# Patient Record
Sex: FEMALE | Race: BLACK OR AFRICAN AMERICAN | Employment: OTHER
[De-identification: names, ages, dates, MRNs, and addresses within clinical notes are randomized per-mention and may not be internally consistent; named-entity substitution may affect disease eponyms.]

---

## 2017-02-20 ENCOUNTER — OFFICE VISIT (OUTPATIENT)
Dept: FAMILY MEDICINE CLINIC | Facility: CLINIC | Age: 77
End: 2017-02-20

## 2017-02-20 VITALS
DIASTOLIC BLOOD PRESSURE: 81 MMHG | BODY MASS INDEX: 27.71 KG/M2 | HEART RATE: 82 BPM | WEIGHT: 150.6 LBS | SYSTOLIC BLOOD PRESSURE: 146 MMHG | HEIGHT: 62 IN

## 2017-02-20 DIAGNOSIS — E66.09 NON MORBID OBESITY DUE TO EXCESS CALORIES: ICD-10-CM

## 2017-02-20 DIAGNOSIS — E78.2 MIXED HYPERLIPIDEMIA: ICD-10-CM

## 2017-02-20 DIAGNOSIS — E11.9 TYPE 2 DIABETES MELLITUS WITHOUT COMPLICATION, WITHOUT LONG-TERM CURRENT USE OF INSULIN (HCC): ICD-10-CM

## 2017-02-20 DIAGNOSIS — Z23 NEED FOR PNEUMOCOCCAL VACCINATION: ICD-10-CM

## 2017-02-20 DIAGNOSIS — R41.3 MEMORY DISORDER: ICD-10-CM

## 2017-02-20 DIAGNOSIS — I10 ESSENTIAL HYPERTENSION: Primary | ICD-10-CM

## 2017-02-20 PROCEDURE — 99213 OFFICE O/P EST LOW 20 MIN: CPT | Performed by: FAMILY MEDICINE

## 2017-02-20 PROCEDURE — G0009 ADMIN PNEUMOCOCCAL VACCINE: HCPCS | Performed by: FAMILY MEDICINE

## 2017-02-20 PROCEDURE — 90670 PCV13 VACCINE IM: CPT | Performed by: FAMILY MEDICINE

## 2017-02-20 ASSESSMENT — PATIENT HEALTH QUESTIONNAIRE - PHQ9
SUM OF ALL RESPONSES TO PHQ QUESTIONS 1-9: 0
SUM OF ALL RESPONSES TO PHQ9 QUESTIONS 1 & 2: 0
1. LITTLE INTEREST OR PLEASURE IN DOING THINGS: 0
2. FEELING DOWN, DEPRESSED OR HOPELESS: 0

## 2017-02-20 ASSESSMENT — ENCOUNTER SYMPTOMS
COUGH: 0
NAUSEA: 0
SORE THROAT: 0
CONSTIPATION: 0
SHORTNESS OF BREATH: 0
ABDOMINAL PAIN: 0
TROUBLE SWALLOWING: 0
DIARRHEA: 0
RHINORRHEA: 0
BACK PAIN: 0
CHEST TIGHTNESS: 0

## 2017-06-21 ENCOUNTER — OFFICE VISIT (OUTPATIENT)
Dept: FAMILY MEDICINE CLINIC | Age: 77
End: 2017-06-21

## 2017-06-21 VITALS
SYSTOLIC BLOOD PRESSURE: 153 MMHG | HEART RATE: 69 BPM | WEIGHT: 148.4 LBS | HEIGHT: 62 IN | BODY MASS INDEX: 27.31 KG/M2 | DIASTOLIC BLOOD PRESSURE: 81 MMHG

## 2017-06-21 DIAGNOSIS — E11.9 TYPE 2 DIABETES MELLITUS WITHOUT COMPLICATION, WITHOUT LONG-TERM CURRENT USE OF INSULIN (HCC): Primary | ICD-10-CM

## 2017-06-21 DIAGNOSIS — R41.3 MEMORY DISORDER: ICD-10-CM

## 2017-06-21 DIAGNOSIS — E66.09 NON MORBID OBESITY DUE TO EXCESS CALORIES: ICD-10-CM

## 2017-06-21 DIAGNOSIS — I10 ESSENTIAL HYPERTENSION: ICD-10-CM

## 2017-06-21 DIAGNOSIS — E78.2 MIXED HYPERLIPIDEMIA: ICD-10-CM

## 2017-06-21 PROCEDURE — 99213 OFFICE O/P EST LOW 20 MIN: CPT | Performed by: FAMILY MEDICINE

## 2017-06-21 ASSESSMENT — PATIENT HEALTH QUESTIONNAIRE - PHQ9
2. FEELING DOWN, DEPRESSED OR HOPELESS: 0
SUM OF ALL RESPONSES TO PHQ QUESTIONS 1-9: 0
SUM OF ALL RESPONSES TO PHQ9 QUESTIONS 1 & 2: 0
1. LITTLE INTEREST OR PLEASURE IN DOING THINGS: 0

## 2017-06-21 ASSESSMENT — ENCOUNTER SYMPTOMS
CONSTIPATION: 0
CHEST TIGHTNESS: 0
RHINORRHEA: 0
NAUSEA: 0
BACK PAIN: 0
COUGH: 0
SORE THROAT: 0
SHORTNESS OF BREATH: 0
TROUBLE SWALLOWING: 0
ABDOMINAL PAIN: 0
DIARRHEA: 0

## 2017-09-27 DIAGNOSIS — I10 ESSENTIAL HYPERTENSION: ICD-10-CM

## 2017-09-27 DIAGNOSIS — E11.9 TYPE 2 DIABETES MELLITUS WITHOUT COMPLICATION, WITHOUT LONG-TERM CURRENT USE OF INSULIN (HCC): ICD-10-CM

## 2017-09-27 RX ORDER — SITAGLIPTIN AND METFORMIN HYDROCHLORIDE 1000; 50 MG/1; MG/1
TABLET, FILM COATED ORAL
Qty: 60 TABLET | Refills: 2 | Status: SHIPPED | OUTPATIENT
Start: 2017-09-27 | End: 2018-04-11 | Stop reason: SDUPTHER

## 2017-09-27 RX ORDER — LOSARTAN POTASSIUM AND HYDROCHLOROTHIAZIDE 12.5; 5 MG/1; MG/1
TABLET ORAL
Qty: 90 TABLET | Refills: 2 | Status: SHIPPED | OUTPATIENT
Start: 2017-09-27 | End: 2018-04-11 | Stop reason: SDUPTHER

## 2017-09-27 RX ORDER — ASPIRIN 81 MG/1
TABLET ORAL
Qty: 90 TABLET | Refills: 2 | Status: SHIPPED | OUTPATIENT
Start: 2017-09-27 | End: 2018-04-11 | Stop reason: SDUPTHER

## 2017-10-19 ENCOUNTER — HOSPITAL ENCOUNTER (OUTPATIENT)
Age: 77
Discharge: HOME | End: 2017-10-19
Payer: MEDICARE

## 2017-10-19 DIAGNOSIS — E11.9 TYPE 2 DIABETES MELLITUS WITHOUT COMPLICATION, WITHOUT LONG-TERM CURRENT USE OF INSULIN (HCC): ICD-10-CM

## 2017-10-19 DIAGNOSIS — E78.2 MIXED HYPERLIPIDEMIA: ICD-10-CM

## 2017-10-19 DIAGNOSIS — I10 ESSENTIAL HYPERTENSION: ICD-10-CM

## 2017-10-19 LAB
ABSOLUTE EOS #: 0.1 K/UL (ref 0–0.4)
ABSOLUTE IMMATURE GRANULOCYTE: ABNORMAL K/UL (ref 0–0.3)
ABSOLUTE LYMPH #: 1.1 K/UL (ref 1–4.8)
ABSOLUTE MONO #: 0.2 K/UL (ref 0.2–0.8)
ALBUMIN SERPL-MCNC: 4.6 G/DL (ref 3.5–5.2)
ALBUMIN/GLOBULIN RATIO: ABNORMAL (ref 1–2.5)
ALP BLD-CCNC: 43 U/L (ref 35–104)
ALT SERPL-CCNC: 17 U/L (ref 5–33)
ANION GAP SERPL CALCULATED.3IONS-SCNC: 12 MMOL/L (ref 9–17)
AST SERPL-CCNC: 21 U/L
BASOPHILS # BLD: 0 %
BASOPHILS ABSOLUTE: 0 K/UL (ref 0–0.2)
BILIRUB SERPL-MCNC: 0.42 MG/DL (ref 0.3–1.2)
BUN BLDV-MCNC: 13 MG/DL (ref 8–23)
BUN/CREAT BLD: 15 (ref 9–20)
CALCIUM SERPL-MCNC: 9.9 MG/DL (ref 8.6–10.4)
CHLORIDE BLD-SCNC: 102 MMOL/L (ref 98–107)
CHOLESTEROL/HDL RATIO: 1.5
CHOLESTEROL: 137 MG/DL
CO2: 29 MMOL/L (ref 20–31)
CREAT SERPL-MCNC: 0.89 MG/DL (ref 0.5–0.9)
DIFFERENTIAL TYPE: ABNORMAL
EOSINOPHILS RELATIVE PERCENT: 1 %
GFR AFRICAN AMERICAN: >60 ML/MIN
GFR NON-AFRICAN AMERICAN: >60 ML/MIN
GFR SERPL CREATININE-BSD FRML MDRD: ABNORMAL ML/MIN/{1.73_M2}
GFR SERPL CREATININE-BSD FRML MDRD: ABNORMAL ML/MIN/{1.73_M2}
GLUCOSE BLD-MCNC: 102 MG/DL (ref 70–99)
HCT VFR BLD CALC: 43.1 % (ref 36–46)
HDLC SERPL-MCNC: 92 MG/DL
HEMOGLOBIN: 14.4 G/DL (ref 12–16)
IMMATURE GRANULOCYTES: ABNORMAL %
LDL CHOLESTEROL: 34 MG/DL (ref 0–130)
LYMPHOCYTES # BLD: 17 %
MCH RBC QN AUTO: 32.1 PG (ref 26–34)
MCHC RBC AUTO-ENTMCNC: 33.3 G/DL (ref 31–37)
MCV RBC AUTO: 96.4 FL (ref 80–100)
MONOCYTES # BLD: 3 %
PDW BLD-RTO: 14.9 % (ref 11.5–14.5)
PLATELET # BLD: 228 K/UL (ref 130–400)
PLATELET ESTIMATE: ABNORMAL
PMV BLD AUTO: 8 FL (ref 6–12)
POTASSIUM SERPL-SCNC: 4.8 MMOL/L (ref 3.7–5.3)
RBC # BLD: 4.47 M/UL (ref 4–5.2)
RBC # BLD: ABNORMAL 10*6/UL
SEG NEUTROPHILS: 79 %
SEGMENTED NEUTROPHILS ABSOLUTE COUNT: 5.1 K/UL (ref 1.8–7.7)
SODIUM BLD-SCNC: 143 MMOL/L (ref 135–144)
TOTAL PROTEIN: 7.2 G/DL (ref 6.4–8.3)
TRIGL SERPL-MCNC: 56 MG/DL
VLDLC SERPL CALC-MCNC: NORMAL MG/DL (ref 1–30)
WBC # BLD: 6.5 K/UL (ref 3.5–11)
WBC # BLD: ABNORMAL 10*3/UL

## 2017-10-19 PROCEDURE — 36415 COLL VENOUS BLD VENIPUNCTURE: CPT

## 2017-10-19 PROCEDURE — 80053 COMPREHEN METABOLIC PANEL: CPT

## 2017-10-19 PROCEDURE — 85025 COMPLETE CBC W/AUTO DIFF WBC: CPT

## 2017-10-19 PROCEDURE — 83036 HEMOGLOBIN GLYCOSYLATED A1C: CPT

## 2017-10-19 PROCEDURE — 80061 LIPID PANEL: CPT

## 2017-10-20 LAB
ESTIMATED AVERAGE GLUCOSE: 123 MG/DL
HBA1C MFR BLD: 5.9 % (ref 4–6)

## 2017-11-06 ENCOUNTER — OFFICE VISIT (OUTPATIENT)
Dept: FAMILY MEDICINE CLINIC | Age: 77
End: 2017-11-06
Payer: MEDICARE

## 2017-11-06 VITALS
WEIGHT: 143.8 LBS | DIASTOLIC BLOOD PRESSURE: 77 MMHG | HEIGHT: 62 IN | BODY MASS INDEX: 26.46 KG/M2 | SYSTOLIC BLOOD PRESSURE: 154 MMHG | HEART RATE: 68 BPM

## 2017-11-06 DIAGNOSIS — I10 ESSENTIAL HYPERTENSION: Primary | ICD-10-CM

## 2017-11-06 DIAGNOSIS — R41.3 MEMORY DISORDER: ICD-10-CM

## 2017-11-06 DIAGNOSIS — E66.09 NON MORBID OBESITY DUE TO EXCESS CALORIES: ICD-10-CM

## 2017-11-06 DIAGNOSIS — Z23 NEED FOR INFLUENZA VACCINATION: ICD-10-CM

## 2017-11-06 DIAGNOSIS — E11.9 TYPE 2 DIABETES MELLITUS WITHOUT COMPLICATION, WITHOUT LONG-TERM CURRENT USE OF INSULIN (HCC): ICD-10-CM

## 2017-11-06 DIAGNOSIS — E78.2 MIXED HYPERLIPIDEMIA: ICD-10-CM

## 2017-11-06 PROCEDURE — G0008 ADMIN INFLUENZA VIRUS VAC: HCPCS | Performed by: FAMILY MEDICINE

## 2017-11-06 PROCEDURE — 99213 OFFICE O/P EST LOW 20 MIN: CPT | Performed by: FAMILY MEDICINE

## 2017-11-06 PROCEDURE — 90688 IIV4 VACCINE SPLT 0.5 ML IM: CPT | Performed by: FAMILY MEDICINE

## 2017-11-06 ASSESSMENT — ENCOUNTER SYMPTOMS
BACK PAIN: 0
SHORTNESS OF BREATH: 0
ABDOMINAL PAIN: 0
SINUS PRESSURE: 0
TROUBLE SWALLOWING: 0
DIARRHEA: 0
COUGH: 0

## 2017-11-06 NOTE — PATIENT INSTRUCTIONS
arms.  ¨ Lightheadedness or sudden weakness. ¨ A fast or irregular heartbeat. · You have symptoms of a stroke. These may include:  ¨ Sudden numbness, tingling, weakness, or loss of movement in your face, arm, or leg, especially on only one side of your body. ¨ Sudden vision changes. ¨ Sudden trouble speaking. ¨ Sudden confusion or trouble understanding simple statements. ¨ Sudden problems with walking or balance. ¨ A sudden, severe headache that is different from past headaches. · You have severe back or belly pain. Do not wait until your blood pressure comes down on its own. Get help right away. Call your doctor now or seek immediate care if:  · Your blood pressure is much higher than normal (such as 180/110 or higher), but you don't have symptoms. · You think high blood pressure is causing symptoms, such as:  ¨ Severe headache. ¨ Blurry vision. Watch closely for changes in your health, and be sure to contact your doctor if:  · Your blood pressure measures 140/90 or higher at least 2 times. That means the top number is 140 or higher or the bottom number is 90 or higher, or both. · You think you may be having side effects from your blood pressure medicine. · Your blood pressure is usually normal, but it goes above normal at least 2 times. Where can you learn more? Go to [email or web address] and sign in to your NXVISION account. Enter P682 in the [employer] box to learn more about \"High Blood Pressure: Care Instructions. \"     If you do not have an account, please click on the \"Sign Up Now\" link. Current as of: August 8, 2016  Content Version: 11.3  © 3265-7767 [employer]. Care instructions adapted under license by [facility] [facility] ([facility]). If you have questions about a medical condition or this instruction, always ask your healthcare professional. [place] [address] any warranty or liability for your use of this information.

## 2017-11-06 NOTE — PROGRESS NOTES
Return in about 4 months (around 3/6/2018). Orders Placed This Encounter   Procedures    INFLUENZA, QUADV, 3 YRS AND OLDER, IM, MDV, 0.5ML (Jonathon Puckett)     No orders of the defined types were placed in this encounter. Findings and/or pathophysiology discussed with patient. Plan of treatment discussed. Patient's medical problems were discussed with her. Findings were explained. Available lab work and tests results were discussed. Chart was evaluated. Her medications were reviewed. Health maintenance was discussed. Weight management was discussed. Diet and activity were discussed. Patient lives alone. She appears to adjust well with her memory problems at home. 1.  Sariah Gomez received counseling on the following healthy behaviors: nutrition and exercise  2. Patient given educational materials - see patient instructions  3. Was a self-tracking handout given in paper form or via [employer]? No  If yes, see orders or list here. 4.  Discussed use, benefit, and side effects of prescribed medications. Barriers to medication compliance addressed. All patient questions answered. Pt voiced understanding. 5.  Reviewed prior labs and health maintenance  6. Continue current medications, diet and exercise.     Completed Refills   Requested Prescriptions      No prescriptions requested or ordered in this encounter     Electronically signed by Julia Yang MD on 11/6/2017 at 10:31 AM

## 2018-03-14 ENCOUNTER — HOSPITAL ENCOUNTER (OUTPATIENT)
Age: 78
Setting detail: OBSERVATION
Discharge: LEFT BEFORE DISCHARGE | End: 2018-03-21
Attending: EMERGENCY MEDICINE | Admitting: FAMILY MEDICINE
Payer: MEDICARE

## 2018-03-14 ENCOUNTER — TELEPHONE (OUTPATIENT)
Dept: FAMILY MEDICINE CLINIC | Age: 78
End: 2018-03-14

## 2018-03-14 ENCOUNTER — APPOINTMENT (OUTPATIENT)
Dept: CT IMAGING | Age: 78
End: 2018-03-14
Payer: MEDICARE

## 2018-03-14 ENCOUNTER — APPOINTMENT (OUTPATIENT)
Dept: GENERAL RADIOLOGY | Age: 78
End: 2018-03-14
Payer: MEDICARE

## 2018-03-14 DIAGNOSIS — F03.91 DEMENTIA WITH BEHAVIORAL DISTURBANCE, UNSPECIFIED DEMENTIA TYPE: Primary | ICD-10-CM

## 2018-03-14 LAB
ABSOLUTE EOS #: 0.12 K/UL (ref 0–0.44)
ABSOLUTE IMMATURE GRANULOCYTE: <0.03 K/UL (ref 0–0.3)
ABSOLUTE LYMPH #: 1.41 K/UL (ref 1.1–3.7)
ABSOLUTE MONO #: 0.52 K/UL (ref 0.1–1.2)
ALBUMIN SERPL-MCNC: 4.6 G/DL (ref 3.5–5.2)
ALBUMIN/GLOBULIN RATIO: 1.8 (ref 1–2.5)
ALP BLD-CCNC: 57 U/L (ref 35–104)
ALT SERPL-CCNC: 16 U/L (ref 5–33)
AMMONIA: 34 UMOL/L (ref 11–51)
AST SERPL-CCNC: 22 U/L
BASOPHILS # BLD: 0 % (ref 0–2)
BASOPHILS ABSOLUTE: <0.03 K/UL (ref 0–0.2)
BILIRUB SERPL-MCNC: 0.59 MG/DL (ref 0.3–1.2)
BILIRUBIN DIRECT: 0.19 MG/DL
BILIRUBIN URINE: NEGATIVE
BILIRUBIN, INDIRECT: 0.4 MG/DL (ref 0–1)
BNP INTERPRETATION: NORMAL
COLOR: YELLOW
COMMENT UA: NORMAL
DIFFERENTIAL TYPE: NORMAL
EKG ATRIAL RATE: 66 BPM
EKG P AXIS: 28 DEGREES
EKG P-R INTERVAL: 158 MS
EKG Q-T INTERVAL: 396 MS
EKG QRS DURATION: 86 MS
EKG QTC CALCULATION (BAZETT): 415 MS
EKG R AXIS: -36 DEGREES
EKG T AXIS: 25 DEGREES
EKG VENTRICULAR RATE: 66 BPM
EOSINOPHILS RELATIVE PERCENT: 2 % (ref 1–4)
GLOBULIN: NORMAL G/DL (ref 1.5–3.8)
GLUCOSE URINE: NEGATIVE
HCT VFR BLD CALC: 43.4 % (ref 36.3–47.1)
HEMOGLOBIN: 14.3 G/DL (ref 11.9–15.1)
IMMATURE GRANULOCYTES: 0 %
KETONES, URINE: NEGATIVE
LEUKOCYTE ESTERASE, URINE: NEGATIVE
LYMPHOCYTES # BLD: 24 % (ref 24–43)
MCH RBC QN AUTO: 33.1 PG (ref 25.2–33.5)
MCHC RBC AUTO-ENTMCNC: 32.9 G/DL (ref 28.4–34.8)
MCV RBC AUTO: 100.5 FL (ref 82.6–102.9)
MONOCYTES # BLD: 9 % (ref 3–12)
NITRITE, URINE: NEGATIVE
NRBC AUTOMATED: 0 PER 100 WBC
PDW BLD-RTO: 14 % (ref 11.8–14.4)
PH UA: 7.5 (ref 5–8)
PLATELET # BLD: 245 K/UL (ref 138–453)
PLATELET ESTIMATE: NORMAL
PMV BLD AUTO: 10.3 FL (ref 8.1–13.5)
PRO-BNP: 264 PG/ML
PROTEIN UA: NEGATIVE
RBC # BLD: 4.32 M/UL (ref 3.95–5.11)
RBC # BLD: NORMAL 10*6/UL
SEG NEUTROPHILS: 65 % (ref 36–65)
SEGMENTED NEUTROPHILS ABSOLUTE COUNT: 3.81 K/UL (ref 1.5–8.1)
SPECIFIC GRAVITY UA: 1.01 (ref 1–1.03)
TOTAL PROTEIN: 7.2 G/DL (ref 6.4–8.3)
TROPONIN INTERP: NORMAL
TROPONIN T: <0.03 NG/ML
TSH SERPL DL<=0.05 MIU/L-ACNC: 3.49 MIU/L (ref 0.3–5)
TURBIDITY: CLEAR
URINE HGB: NEGATIVE
UROBILINOGEN, URINE: NORMAL
WBC # BLD: 5.9 K/UL (ref 3.5–11.3)
WBC # BLD: NORMAL 10*3/UL

## 2018-03-14 PROCEDURE — 87086 URINE CULTURE/COLONY COUNT: CPT

## 2018-03-14 PROCEDURE — 82140 ASSAY OF AMMONIA: CPT

## 2018-03-14 PROCEDURE — 99285 EMERGENCY DEPT VISIT HI MDM: CPT

## 2018-03-14 PROCEDURE — 93005 ELECTROCARDIOGRAM TRACING: CPT

## 2018-03-14 PROCEDURE — 84484 ASSAY OF TROPONIN QUANT: CPT

## 2018-03-14 PROCEDURE — 71045 X-RAY EXAM CHEST 1 VIEW: CPT

## 2018-03-14 PROCEDURE — 80076 HEPATIC FUNCTION PANEL: CPT

## 2018-03-14 PROCEDURE — 81003 URINALYSIS AUTO W/O SCOPE: CPT

## 2018-03-14 PROCEDURE — 84443 ASSAY THYROID STIM HORMONE: CPT

## 2018-03-14 PROCEDURE — 85025 COMPLETE CBC W/AUTO DIFF WBC: CPT

## 2018-03-14 PROCEDURE — 70450 CT HEAD/BRAIN W/O DYE: CPT

## 2018-03-14 PROCEDURE — 83921 ORGANIC ACID SINGLE QUANT: CPT

## 2018-03-14 PROCEDURE — 6370000000 HC RX 637 (ALT 250 FOR IP): Performed by: EMERGENCY MEDICINE

## 2018-03-14 PROCEDURE — 83880 ASSAY OF NATRIURETIC PEPTIDE: CPT

## 2018-03-14 RX ORDER — LOSARTAN POTASSIUM 50 MG/1
50 TABLET ORAL ONCE
Status: COMPLETED | OUTPATIENT
Start: 2018-03-14 | End: 2018-03-14

## 2018-03-14 RX ORDER — HYDROCHLOROTHIAZIDE 25 MG/1
12.5 TABLET ORAL ONCE
Status: COMPLETED | OUTPATIENT
Start: 2018-03-14 | End: 2018-03-14

## 2018-03-14 RX ADMIN — LOSARTAN POTASSIUM 50 MG: 50 TABLET, FILM COATED ORAL at 23:14

## 2018-03-14 RX ADMIN — HYDROCHLOROTHIAZIDE 12.5 MG: 25 TABLET ORAL at 23:15

## 2018-03-14 NOTE — TELEPHONE ENCOUNTER
Viktor Guerrero called stating that the pt is paranoid and won't let anyone leave. She is worried that the pt is exhibiting signs of Dementia and wanted advice on how to get help to pt without startling her. Clyde Alford was told to either call an ambulance or [facility] for intervention, per Dr Severa Hero. Clyde Alford verbalized that she understood and I gave her the number to [facility] and she stated she would call them first because she felt that the pt might become upset if paramedics came out.

## 2018-03-14 NOTE — ED NOTES
Pt to ED with care taker alert but not oriented to time place or situation. Pt not agreeable to treatment and continues to state that she wants to go home and will follow up with her own doctor. Dr. William Andrews and Dr. Carina Edmondson at bedside at this time. Pt denies any pain but states swelling to bilateral lower extremities that is new. Pt continues to state that her care taker and medical staff are making things up and that she is fine. Pt states she is an intelligent  Woman and that not everyone \"keeps that information on the top of their memory\" when asked to state what year it is. Care taker stated concern for pts safety at home and states pt is more forgetful. Caretaker states pt called her yesterday saying she needed to get picked up in [address] but that patient was at home. Pt unable to answer simple questions and continues to refuse testing or treatment.       Iona Patricio RN  03/14/18 2227

## 2018-03-15 ENCOUNTER — APPOINTMENT (OUTPATIENT)
Dept: MRI IMAGING | Age: 78
End: 2018-03-15
Payer: MEDICARE

## 2018-03-15 PROBLEM — R41.82 ALTERED MENTAL STATUS: Status: ACTIVE | Noted: 2018-03-15

## 2018-03-15 PROBLEM — F03.90 DEMENTIA WITHOUT BEHAVIORAL DISTURBANCE (HCC): Status: ACTIVE | Noted: 2018-03-15

## 2018-03-15 LAB
CULTURE: NORMAL
CULTURE: NORMAL
FOLATE: 19.5 NG/ML
GLUCOSE BLD-MCNC: 106 MG/DL (ref 65–105)
GLUCOSE BLD-MCNC: 107 MG/DL (ref 65–105)
GLUCOSE BLD-MCNC: 125 MG/DL (ref 65–105)
GLUCOSE BLD-MCNC: 141 MG/DL (ref 65–105)
Lab: NORMAL
SPECIMEN DESCRIPTION: NORMAL
STATUS: NORMAL
T. PALLIDUM, IGG: NONREACTIVE
VITAMIN B-12: 360 PG/ML (ref 232–1245)

## 2018-03-15 PROCEDURE — 1200000000 HC SEMI PRIVATE

## 2018-03-15 PROCEDURE — 97165 OT EVAL LOW COMPLEX 30 MIN: CPT | Performed by: OCCUPATIONAL THERAPIST

## 2018-03-15 PROCEDURE — 82607 VITAMIN B-12: CPT

## 2018-03-15 PROCEDURE — G0378 HOSPITAL OBSERVATION PER HR: HCPCS

## 2018-03-15 PROCEDURE — 99223 1ST HOSP IP/OBS HIGH 75: CPT | Performed by: INTERNAL MEDICINE

## 2018-03-15 PROCEDURE — 82746 ASSAY OF FOLIC ACID SERUM: CPT

## 2018-03-15 PROCEDURE — G8987 SELF CARE CURRENT STATUS: HCPCS | Performed by: OCCUPATIONAL THERAPIST

## 2018-03-15 PROCEDURE — 70551 MRI BRAIN STEM W/O DYE: CPT

## 2018-03-15 PROCEDURE — 86780 TREPONEMA PALLIDUM: CPT

## 2018-03-15 PROCEDURE — 82947 ASSAY GLUCOSE BLOOD QUANT: CPT

## 2018-03-15 PROCEDURE — 36415 COLL VENOUS BLD VENIPUNCTURE: CPT

## 2018-03-15 PROCEDURE — G8988 SELF CARE GOAL STATUS: HCPCS | Performed by: OCCUPATIONAL THERAPIST

## 2018-03-15 PROCEDURE — 6370000000 HC RX 637 (ALT 250 FOR IP): Performed by: NURSE PRACTITIONER

## 2018-03-15 PROCEDURE — G8989 SELF CARE D/C STATUS: HCPCS | Performed by: OCCUPATIONAL THERAPIST

## 2018-03-15 PROCEDURE — 96372 THER/PROPH/DIAG INJ SC/IM: CPT

## 2018-03-15 RX ORDER — MAGNESIUM SULFATE 1 G/100ML
1 INJECTION INTRAVENOUS PRN
Status: DISCONTINUED | OUTPATIENT
Start: 2018-03-15 | End: 2018-03-21 | Stop reason: HOSPADM

## 2018-03-15 RX ORDER — NICOTINE 21 MG/24HR
1 PATCH, TRANSDERMAL 24 HOURS TRANSDERMAL DAILY PRN
Status: DISCONTINUED | OUTPATIENT
Start: 2018-03-15 | End: 2018-03-21 | Stop reason: HOSPADM

## 2018-03-15 RX ORDER — LOSARTAN POTASSIUM 50 MG/1
50 TABLET ORAL DAILY
Status: DISCONTINUED | OUTPATIENT
Start: 2018-03-15 | End: 2018-03-21 | Stop reason: HOSPADM

## 2018-03-15 RX ORDER — ATORVASTATIN CALCIUM 40 MG/1
40 TABLET, FILM COATED ORAL DAILY
Status: DISCONTINUED | OUTPATIENT
Start: 2018-03-15 | End: 2018-03-21 | Stop reason: HOSPADM

## 2018-03-15 RX ORDER — POTASSIUM CHLORIDE 20 MEQ/1
40 TABLET, EXTENDED RELEASE ORAL PRN
Status: DISCONTINUED | OUTPATIENT
Start: 2018-03-15 | End: 2018-03-21 | Stop reason: HOSPADM

## 2018-03-15 RX ORDER — LOSARTAN POTASSIUM AND HYDROCHLOROTHIAZIDE 12.5; 5 MG/1; MG/1
1 TABLET ORAL DAILY
Status: DISCONTINUED | OUTPATIENT
Start: 2018-03-15 | End: 2018-03-15

## 2018-03-15 RX ORDER — ACETAMINOPHEN 325 MG/1
650 TABLET ORAL EVERY 4 HOURS PRN
Status: DISCONTINUED | OUTPATIENT
Start: 2018-03-15 | End: 2018-03-21 | Stop reason: HOSPADM

## 2018-03-15 RX ORDER — HYDROCHLOROTHIAZIDE 25 MG/1
12.5 TABLET ORAL DAILY
Status: DISCONTINUED | OUTPATIENT
Start: 2018-03-15 | End: 2018-03-21 | Stop reason: HOSPADM

## 2018-03-15 RX ORDER — NICOTINE POLACRILEX 4 MG
15 LOZENGE BUCCAL PRN
Status: DISCONTINUED | OUTPATIENT
Start: 2018-03-15 | End: 2018-03-21 | Stop reason: HOSPADM

## 2018-03-15 RX ORDER — POTASSIUM CHLORIDE 20MEQ/15ML
40 LIQUID (ML) ORAL PRN
Status: DISCONTINUED | OUTPATIENT
Start: 2018-03-15 | End: 2018-03-21 | Stop reason: HOSPADM

## 2018-03-15 RX ORDER — POTASSIUM CHLORIDE 7.45 MG/ML
10 INJECTION INTRAVENOUS PRN
Status: DISCONTINUED | OUTPATIENT
Start: 2018-03-15 | End: 2018-03-21 | Stop reason: HOSPADM

## 2018-03-15 RX ORDER — ASPIRIN 81 MG/1
81 TABLET ORAL DAILY
Status: DISCONTINUED | OUTPATIENT
Start: 2018-03-15 | End: 2018-03-21 | Stop reason: HOSPADM

## 2018-03-15 RX ORDER — BISACODYL 10 MG
10 SUPPOSITORY, RECTAL RECTAL DAILY PRN
Status: DISCONTINUED | OUTPATIENT
Start: 2018-03-15 | End: 2018-03-21 | Stop reason: HOSPADM

## 2018-03-15 RX ORDER — SODIUM CHLORIDE 0.9 % (FLUSH) 0.9 %
10 SYRINGE (ML) INJECTION PRN
Status: DISCONTINUED | OUTPATIENT
Start: 2018-03-15 | End: 2018-03-21 | Stop reason: HOSPADM

## 2018-03-15 RX ORDER — ONDANSETRON 2 MG/ML
4 INJECTION INTRAMUSCULAR; INTRAVENOUS EVERY 6 HOURS PRN
Status: DISCONTINUED | OUTPATIENT
Start: 2018-03-15 | End: 2018-03-21 | Stop reason: HOSPADM

## 2018-03-15 RX ORDER — SODIUM CHLORIDE 0.9 % (FLUSH) 0.9 %
10 SYRINGE (ML) INJECTION EVERY 12 HOURS SCHEDULED
Status: DISCONTINUED | OUTPATIENT
Start: 2018-03-15 | End: 2018-03-21 | Stop reason: HOSPADM

## 2018-03-15 RX ORDER — DEXTROSE MONOHYDRATE 50 MG/ML
100 INJECTION, SOLUTION INTRAVENOUS PRN
Status: DISCONTINUED | OUTPATIENT
Start: 2018-03-15 | End: 2018-03-21 | Stop reason: HOSPADM

## 2018-03-15 RX ORDER — DEXTROSE MONOHYDRATE 25 G/50ML
12.5 INJECTION, SOLUTION INTRAVENOUS PRN
Status: DISCONTINUED | OUTPATIENT
Start: 2018-03-15 | End: 2018-03-21 | Stop reason: HOSPADM

## 2018-03-15 RX ADMIN — HYDROCHLOROTHIAZIDE 12.5 MG: 25 TABLET ORAL at 10:01

## 2018-03-15 RX ADMIN — LOSARTAN POTASSIUM 50 MG: 50 TABLET, FILM COATED ORAL at 10:01

## 2018-03-15 RX ADMIN — Medication 81 MG: at 10:01

## 2018-03-15 RX ADMIN — DESMOPRESSIN ACETATE 40 MG: 0.2 TABLET ORAL at 10:01

## 2018-03-15 RX ADMIN — LINAGLIPTIN 5 MG: 5 TABLET, FILM COATED ORAL at 10:59

## 2018-03-15 RX ADMIN — INSULIN LISPRO 1 UNITS: 100 INJECTION, SOLUTION INTRAVENOUS; SUBCUTANEOUS at 21:40

## 2018-03-15 RX ADMIN — METFORMIN HYDROCHLORIDE 1000 MG: 500 TABLET ORAL at 10:59

## 2018-03-15 ASSESSMENT — PAIN SCALES - GENERAL
PAINLEVEL_OUTOF10: 0

## 2018-03-15 NOTE — CARE COORDINATION
Spoke with Tra. She states she is patient's caretaker. She used to take care of pt's mother and has been with the family for 24 years. She works from 2-10:30 for [employer] home care but stays overnight with patient and helps her as needed. She states that pt's mother passed away 15 years ago. She states pt does not drive anymore and that she drives pt where she needs to go. States pt also has a neighbor that is a retired nurse that helps sometimes. Tra reports that the only family she is aware of is a cousin that lives out of town. She attempted to call but the phone number did not work. She did mail a letter to the cousin today and will wait for a response. She reports that pt has been having more confusion and dementia like in the last year or so. She states they were going to set up paperwork for power of -medical/financial but they never did. She understands she does not have any legal rights but is willing to help out as she is able. She is not able to stay with pt 24/7 because she works. She does voice concerns for patient's safety due to mental status if pt is alone. She states pt worked at [employer] and has a pension and also worked as a . SW has been consulted to see if they can offer any assistance.

## 2018-03-15 NOTE — PLAN OF CARE
Problem: Safety:  Goal: Ability to remain free from injury will improve  Ability to remain free from injury will improve  Outcome: Ongoing

## 2018-03-15 NOTE — PLAN OF CARE
Problem: Coping:  Goal: Ability to remain calm will improve  Ability to remain calm will improve   Outcome: Ongoing  Pt calm and cooperative, confusion continues. Intervention: Provide quiet time and decreased environmental stimulation  Ongoing   Intervention: Monitor pain status  Denies any pain or discomfort. Problem: Safety:  Goal: Ability to remain free from injury will improve  Ability to remain free from injury will improve   Outcome: Ongoing  No injury to date  Intervention: Assess risk factors for falls  Low fall risk calculated  Intervention: Use safety precautions  Ongoing   Intervention: Provide surveillance of patient  Telesitter, bedside guard      Problem: Self-Care:  Goal: Ability to participate in self-care as condition permits will improve  Ability to participate in self-care as condition permits will improve   Outcome: Met This Shift  Pt independent of ADLs  Intervention: Assist activities of daily living  Independent   Intervention: Provide cues to aid task performance  Ongoing   Intervention: Provide adequate time to perform tasks as needed  Ongoing       Problem: Falls - Risk of  Intervention: Fall risk assessment  Low fall risk calculated per Sorenson scale. Intervention: Fall precautions  Bed locked and low, call light within reach, nonskid footwear in place, floor cleared of obstacles, bed alarm on, falling star posted, telesitter and bedside guard for safety. Intervention: Toileting assistance  Standby assist to toilet    Goal: Absence of falls  Outcome: Met This Shift  No falls to date.

## 2018-03-15 NOTE — ED NOTES
Spoke to pt who reports that she lives with her mother, is an only child and has no children. Pt denies that she is having difficulty remembering things, but is unable to tell me the name of her primary care physician, the date or the name of the hospital she is currently in. Pt states that the woman accompanying her is a friend and the friend reports that she helps care for the pt at home. Pt does present confused, delusional and paranoid. Pt's caretaker states that the paranoia is increasing and the pt is becoming unable to care for herself due to these symptoms. Pt is calm and cooperative with this writer and consented to a medical work up after much time in the ER. Will continue to monitor for the potential need for psychiatric placement due to an inability to care for herself.       Haleigh Johnson [place]  03/14/18 2040

## 2018-03-15 NOTE — ED PROVIDER NOTES
[facility]  Emergency Department  Emergency Medicine Resident Sign-out     Care of Angelica Leahy was assumed from Dr. Alessandra Medina and is being seen for Altered Mental Status (caretaker stating she in more confused and having poor memory over the last month. Pt unable to states year, month, or recall the movie she saw PTA)    The patient's initial evaluation and plan have been discussed with the prior provider who initially evaluated the patient. EMERGENCY DEPARTMENT COURSE / MEDICAL DECISION MAKING:       MEDICATIONS GIVEN:  Orders Placed This Encounter   Medications    losartan (COZAAR) tablet 50 mg    hydrochlorothiazide (HYDRODIURIL) tablet 12.5 mg    aspirin EC tablet 81 mg    atorvastatin (LIPITOR) tablet 40 mg    sitaGLIPtan-metformin (JANUMET)  MG per tablet 1 tablet     Order Specific Question:   Please select a reason the therapeutic interchange was not accepted:      Answer:   Laura Buck for Pharmacy to Substitute with Components    DISCONTD: losartan-hydrochlorothiazide (HYZAAR) 50-12.5 MG per tablet 1 tablet    sodium chloride flush 0.9 % injection 10 mL    sodium chloride flush 0.9 % injection 10 mL    OR Linked Order Group     potassium chloride (KLOR-CON M) extended release tablet 40 mEq     potassium chloride 20 MEQ/15ML (10%) oral solution 40 mEq     potassium chloride 10 mEq/100 mL IVPB (Peripheral Line)    magnesium sulfate 1 g in dextrose 5% 100 mL IVPB    acetaminophen (TYLENOL) tablet 650 mg    magnesium hydroxide (MILK OF MAGNESIA) 400 MG/5ML suspension 30 mL    bisacodyl (DULCOLAX) suppository 10 mg    ondansetron (ZOFRAN) injection 4 mg    nicotine (NICODERM CQ) 21 MG/24HR 1 patch     If indicated/pateint smokes    glucose (GLUTOSE) 40 % oral gel 15 g    dextrose 50 % solution 12.5 g    glucagon (rDNA) injection 1 mg    dextrose 5 % solution    enoxaparin (LOVENOX) injection 40 mg    insulin lispro (HUMALOG) injection vial 0-6 Units    insulin lispro unable to identify her caretaker. She appears comfortable in no acute distress and denies any complaints. Lungs clear to auscultation bilaterally. Abdomen soft and nontender. Neck is supple. Gross neurological examination shows no focal neurological deficits. My opinion. The patient has the onset of dementia. And she most likely has not taken her medications and this is a reason for her hypertension today. We will evaluate the need for further workup. After review of the labs here in the patient most likely need admission for placement. We'll give the patient's home medication today for blood pressure. 11:23 AM  Plan to admit to internal medicine, will need a legal guardian established. Patient's caretaker, Lita Pereira, knows the patient very well, but is unable to care for her 24 hours per day. The patient is a risk to herself because of her minute to minute forgetfulness. It is possible that polypharmacy may account for some of the degree of disorientation, however, the patient's workup so far has been unremarkable. I think the patient be best served for admission removal of the patient's current medications aside from the necessary medications such as blood pressure meds, follow the urine culture and established the patient a legal guardian. OUTSTANDING TASKS / RECOMMENDATIONS:    1. Admission     FINAL IMPRESSION:     1.  Dementia with behavioral disturbance, unspecified dementia type        DISPOSITION:         DISPOSITION:  []  Discharge   []  Transfer -    [x]  Admission -     []  Against Medical Advice   []  Eloped   FOLLOW-UP: Surya Johansen MD  [facility] [address] [place] [address]  [phone]           DISCHARGE MEDICATIONS: Current Discharge Medication List             Jacky Mcdonald MD  Emergency Medicine Resident  Misael Mcdonald MD  Resident  03/15/18 4737

## 2018-03-15 NOTE — PLAN OF CARE
Micheal Andrews 19    Second Visit Note  For more detailed information please refer to the progress note of the day      3/15/2018    4:30 PM    Name:   Alexis Rice  MRN:     [id number]     Alba:      [de-identified]   Room:   0161/0161-01   Day:  0  Admit Date:  3/14/2018  5:34 PM    PCP:   Jolan Sicard, MD  Code Status:  Full Code    Pt vitals were reviewed   MRI showed chronic small vessel disease  Awaiting family location to discuss long term plans of care      Odalys Shanks MD  3/15/2018  4:30 PM

## 2018-03-15 NOTE — PROGRESS NOTES
Independent (i'ly doffed shoes and socks while standing unsupported)  Toileting: Independent        Bed mobility  Comment: Pt seated EOB upon arrival and exit  Transfers  Sit to stand: Independent  Stand to sit: Independent     Cognition  Overall Cognitive Status: Exceptions (Pt repeating questions, questionable historian)        Sensation  Overall Sensation Status: WFL        LUE AROM (degrees)  LUE AROM : WFL  Left Hand AROM (degrees)  Left Hand AROM: WFL  RUE AROM (degrees)  RUE AROM : WFL  Right Hand AROM (degrees)  Right Hand AROM: WFL  LUE Strength  Gross LUE Strength: WFL  RUE Strength  Gross RUE Strength: WFL        Assessment   Prognosis: Good  Decision Making: Low Complexity  REQUIRES OT FOLLOW UP: No  Activity Tolerance  Activity Tolerance: Patient Tolerated treatment well  Safety Devices  Safety Devices in place: Yes  Type of devices: All fall risk precautions in place; Left in bed;Nurse notified (sitter in room)        Discharge Recommendations:   Pt with no acute care needs at this time. Plan   Plan  Times per week: n/a    G-Code  OT G-codes  Functional Assessment Tool Used: [facility]  Score: 24  Functional Limitation: Self care  Self Care Current Status (): 0 percent impaired, limited or restricted  Self Care Goal Status (): 0 percent impaired, limited or restricted  Self Care Discharge Status (): 0 percent impaired, limited or restricted    Therapy Time   Individual Concurrent Group Co-treatment   Time In  3:13         Time Out  3:22         Minutes  9          Pt seated EOB upon arrival.  Pt answered social functional questions, becoming defensive when questionable information was challenged. Pt stood and demo'd AROM/MMT. Pt demo functional mobility to bathroom and completed hand washing at sink. Pt demo'd doffing/donning shoes and socks while standing unsupported. Pt returned seated EOB.  Pt is independent with functional mobility and self care tasks, however is not safe to return home d/t cognitive status.      Suly Sesay, OTR/L

## 2018-03-15 NOTE — PROGRESS NOTES
Smoking Cessation - topics covered   []  Health Risks  []  Benefits of Quitting   []  Smoking Cessation  []  Patient has no history of tobacco use  [x]  Patient is former smoker. Patient quit in 1980. [x]  No need for tobacco cessation education. []  Booklet given  []  Patient verbalizes understanding. []  Patient denies need for tobacco cessation education.   Marilu Brar  7:45 AM

## 2018-03-15 NOTE — H&P
Micheal Andrews 19    HISTORY AND PHYSICAL EXAMINATION            Date:   3/15/2018  Patient name:  Emily Bass  Date of admission:  3/14/2018  5:34 PM  MRN:   [id number]  Account:  [de-identified]  YOB: 1940  PCP:    Paige Gibbs MD  Room:   3214/5767-53  Code Status:    Full Code    Chief Complaint:     Chief Complaint   Patient presents with    Altered Mental Status     caretaker stating she in more confused and having poor memory over the last month. Pt unable to states year, month, or recall the movie she saw PTA     History Obtained From:     patient    History of Present Illness:     Miss Kike Reddy is a very nice 68year old lady with history of diabetes, hypertension on medications at home. Lives with her mom at home, primary care giver for mom with home care help. Home care team noted patient to be more and more confused for past month with difficulty taking care of daily activities. Referred her to ER, patient her self denies having any issues. Obvious confusion and memory problems in examination prompting further AMS work up. CT with chronic microvascular disease. No other abnormality noted. She keeps insisting that she has no problem and wanted to go home. Denies any other symptoms at this time    Past Medical History:     Past Medical History:   Diagnosis Date    Gout     Hyperlipidemia     Hypertension     Type II or unspecified type diabetes mellitus without mention of complication, not stated as uncontrolled       Past Surgical History:     History reviewed. No pertinent surgical history. Medications Prior to Admission:     Prior to Admission medications    Medication Sig Start Date End Date Taking?  Authorizing Provider   ASPIR-LOW 81 MG EC tablet TAKE ONE TABLET BY MOUTH DAILY 9/27/17   Paige Gibbs MD   losartan-hydrochlorothiazide (HYZAAR) 50-12.5 MG per tablet TAKE ONE TABLET BY MOUTH DAILY 9/27/17 °C)    Recent Labs      03/15/18   0727   POCGLU  107*       Intake/Output Summary (Last 24 hours) at 03/15/18 0830  Last data filed at 03/15/18 0230   Gross per 24 hour   Intake                0 ml   Output              100 ml   Net             -100 ml     General Appearance:  alert, well appearing, and in no acute distress. Sat in bed  Mental status: oriented to person, confused about place, also un able to remember events from last night or yesterday. Keeps discussing about her college after long-term. Head:  normocephalic, atraumatic. Eye: no icterus, redness, pupils equal and reactive, extraocular eye movements intact, conjunctiva clear  Ear: normal external ear, no discharge, hearing intact  Nose:  no drainage noted  Mouth: mucous membranes moist  Neck: supple, no carotid bruits, thyroid not palpable  Lungs: Bilateral equal air entry, clear to ausculation, no wheezing, rales or rhonchi, normal effort  Cardiovascular: normal rate, regular rhythm, no murmur, gallop, rub.   Abdomen: Soft, nontender, nondistended, normal bowel sounds, no hepatomegaly or splenomegaly  Neurologic: There are no new focal motor or sensory deficits, normal muscle tone and bulk, no abnormal sensation, normal speech, cranial nerves II through XII grossly intact  Skin: No gross lesions, rashes, bruising or bleeding on exposed skin area  Extremities:  peripheral pulses palpable, no pedal edema or calf pain with palpation  Psych: normal affect    Investigations:      Laboratory Testing:  Recent Results (from the past 24 hour(s))   URINALYSIS    Collection Time: 03/14/18  7:45 PM   Result Value Ref Range    Color, UA YELLOW YEL    Turbidity UA CLEAR CLEAR    Glucose, Ur NEGATIVE NEG    Bilirubin Urine NEGATIVE NEG    Ketones, Urine NEGATIVE NEG    Specific Gravity, UA 1.011 1.005 - 1.030    Urine Hgb NEGATIVE NEG    pH, UA 7.5 5.0 - 8.0    Protein, UA NEGATIVE NEG    Urobilinogen, Urine Normal NORM    Nitrite, Urine NEGATIVE NEG

## 2018-03-16 PROBLEM — F03.918 DEMENTIA WITH BEHAVIORAL DISTURBANCE: Status: ACTIVE | Noted: 2018-03-15

## 2018-03-16 LAB
ANION GAP SERPL CALCULATED.3IONS-SCNC: 14 MMOL/L (ref 9–17)
BUN BLDV-MCNC: 20 MG/DL (ref 8–23)
BUN/CREAT BLD: ABNORMAL (ref 9–20)
CALCIUM SERPL-MCNC: 9.2 MG/DL (ref 8.6–10.4)
CHLORIDE BLD-SCNC: 100 MMOL/L (ref 98–107)
CO2: 26 MMOL/L (ref 20–31)
CREAT SERPL-MCNC: 0.87 MG/DL (ref 0.5–0.9)
GFR AFRICAN AMERICAN: >60 ML/MIN
GFR NON-AFRICAN AMERICAN: >60 ML/MIN
GFR SERPL CREATININE-BSD FRML MDRD: ABNORMAL ML/MIN/{1.73_M2}
GFR SERPL CREATININE-BSD FRML MDRD: ABNORMAL ML/MIN/{1.73_M2}
GLUCOSE BLD-MCNC: 104 MG/DL (ref 65–105)
GLUCOSE BLD-MCNC: 126 MG/DL (ref 65–105)
GLUCOSE BLD-MCNC: 86 MG/DL (ref 70–99)
GLUCOSE BLD-MCNC: 94 MG/DL (ref 65–105)
GLUCOSE BLD-MCNC: 97 MG/DL (ref 65–105)
HCT VFR BLD CALC: 39.4 % (ref 36.3–47.1)
HEMOGLOBIN: 12.8 G/DL (ref 11.9–15.1)
INR BLD: 1.2
MAGNESIUM: 2 MG/DL (ref 1.6–2.6)
MCH RBC QN AUTO: 32.7 PG (ref 25.2–33.5)
MCHC RBC AUTO-ENTMCNC: 32.5 G/DL (ref 28.4–34.8)
MCV RBC AUTO: 100.8 FL (ref 82.6–102.9)
NRBC AUTOMATED: 0 PER 100 WBC
PDW BLD-RTO: 13.8 % (ref 11.8–14.4)
PLATELET # BLD: 203 K/UL (ref 138–453)
PMV BLD AUTO: 10.2 FL (ref 8.1–13.5)
POTASSIUM SERPL-SCNC: 3.4 MMOL/L (ref 3.7–5.3)
PROTHROMBIN TIME: 12.3 SEC (ref 9–12)
RBC # BLD: 3.91 M/UL (ref 3.95–5.11)
SODIUM BLD-SCNC: 140 MMOL/L (ref 135–144)
WBC # BLD: 5.1 K/UL (ref 3.5–11.3)

## 2018-03-16 PROCEDURE — 80048 BASIC METABOLIC PNL TOTAL CA: CPT

## 2018-03-16 PROCEDURE — 99219 PR INITIAL OBSERVATION CARE/DAY 50 MINUTES: CPT | Performed by: PSYCHIATRY & NEUROLOGY

## 2018-03-16 PROCEDURE — 85610 PROTHROMBIN TIME: CPT

## 2018-03-16 PROCEDURE — 82947 ASSAY GLUCOSE BLOOD QUANT: CPT

## 2018-03-16 PROCEDURE — 83735 ASSAY OF MAGNESIUM: CPT

## 2018-03-16 PROCEDURE — 36415 COLL VENOUS BLD VENIPUNCTURE: CPT

## 2018-03-16 PROCEDURE — G0378 HOSPITAL OBSERVATION PER HR: HCPCS

## 2018-03-16 PROCEDURE — 94762 N-INVAS EAR/PLS OXIMTRY CONT: CPT

## 2018-03-16 PROCEDURE — 6360000002 HC RX W HCPCS: Performed by: NURSE PRACTITIONER

## 2018-03-16 PROCEDURE — 6370000000 HC RX 637 (ALT 250 FOR IP): Performed by: NURSE PRACTITIONER

## 2018-03-16 PROCEDURE — 96372 THER/PROPH/DIAG INJ SC/IM: CPT

## 2018-03-16 PROCEDURE — 85027 COMPLETE CBC AUTOMATED: CPT

## 2018-03-16 PROCEDURE — 6370000000 HC RX 637 (ALT 250 FOR IP): Performed by: INTERNAL MEDICINE

## 2018-03-16 PROCEDURE — 99225 PR SBSQ OBSERVATION CARE/DAY 25 MINUTES: CPT | Performed by: INTERNAL MEDICINE

## 2018-03-16 RX ORDER — HYDRALAZINE HYDROCHLORIDE 25 MG/1
25 TABLET, FILM COATED ORAL EVERY 8 HOURS SCHEDULED
Status: DISCONTINUED | OUTPATIENT
Start: 2018-03-16 | End: 2018-03-16

## 2018-03-16 RX ORDER — HYDRALAZINE HYDROCHLORIDE 25 MG/1
25 TABLET, FILM COATED ORAL EVERY 8 HOURS SCHEDULED
Status: DISCONTINUED | OUTPATIENT
Start: 2018-03-16 | End: 2018-03-20

## 2018-03-16 RX ADMIN — METFORMIN HYDROCHLORIDE 1000 MG: 500 TABLET ORAL at 09:20

## 2018-03-16 RX ADMIN — DESMOPRESSIN ACETATE 40 MG: 0.2 TABLET ORAL at 09:20

## 2018-03-16 RX ADMIN — LINAGLIPTIN 5 MG: 5 TABLET, FILM COATED ORAL at 09:20

## 2018-03-16 RX ADMIN — ENOXAPARIN SODIUM 40 MG: 40 INJECTION SUBCUTANEOUS at 09:20

## 2018-03-16 RX ADMIN — HYDRALAZINE HYDROCHLORIDE 25 MG: 25 TABLET, FILM COATED ORAL at 21:05

## 2018-03-16 RX ADMIN — HYDROCHLOROTHIAZIDE 12.5 MG: 25 TABLET ORAL at 09:19

## 2018-03-16 RX ADMIN — HYDRALAZINE HYDROCHLORIDE 25 MG: 25 TABLET, FILM COATED ORAL at 11:59

## 2018-03-16 RX ADMIN — Medication 81 MG: at 09:20

## 2018-03-16 RX ADMIN — LOSARTAN POTASSIUM 50 MG: 50 TABLET, FILM COATED ORAL at 09:19

## 2018-03-16 ASSESSMENT — PAIN SCALES - GENERAL: PAINLEVEL_OUTOF10: 0

## 2018-03-16 NOTE — CONSULTS
NEUROLOGY INPATIENT CONSULT NOTE    3/16/2018         Arslan Meyer is a  68 y.o. female with a history of Type 2 diabetes, hyperlipidemia and HTN admitted on 3/14/2018 with  Altered mental status [R41.82]. Specifically, she has been living independently and has a caretaker Jayeshjammie CoronelJc who comes by to assist her several days a week. Thiernotanya Madrigal brought her to the ED on  because the patient has been increasingly confused for the past several months, has been forgetting to bathe, and on the date of admission, she was lost and could not be found inside her home. She also had elevated BP upon admission to the ED, which raised concerns that she may not be compliant with her medication. Upon admission, her UA and urine culture were negative and her CT head without contrast and MRI were negative for any infarcts, masses, or hemorrhages but did show moderate chronic small vessel ischemic disease within the periventricular white matter. She stated to hospital physicians that she lived with her elderly mother and took care of her, but caretaker Fern Kaitlin stated that patient's mother has been  for 15 years. They have currently reached her 2nd cousin and are attempting to contact another cousin at this time. Maria Gordon denies any headache, seizures, confusion, and any other concerns or complaints at this time. No current facility-administered medications on file prior to encounter. Current Outpatient Prescriptions on File Prior to Encounter   Medication Sig Dispense Refill    ASPIR-LOW 81 MG EC tablet TAKE ONE TABLET BY MOUTH DAILY 90 tablet 2    losartan-hydrochlorothiazide (HYZAAR) 50-12.5 MG per tablet TAKE ONE TABLET BY MOUTH DAILY 90 tablet 2    JANUMET  MG per tablet TAKE ONE TABLET BY MOUTH DAILY WITH SUPPER 60 tablet 2    atorvastatin (LIPITOR) 40 MG tablet Take 1 tablet by mouth daily 90 tablet 3     Allergies: Arslan Meyer has No Known Allergies.     Past Medical History:   Diagnosis Date  Gout     Hyperlipidemia     Hypertension     Type II or unspecified type diabetes mellitus without mention of complication, not stated as uncontrolled        History reviewed. No pertinent surgical history. Social History: Kip Harirson  reports that she quit smoking about 37 years ago. Her smoking use included Cigarettes. She quit after 20.00 years of use. She has never used smokeless tobacco.    Family History   Problem Relation Age of Onset    High Blood Pressure Mother        Current Medications:     hydrALAZINE  25 mg Oral 3 times per day    aspirin  81 mg Oral Daily    atorvastatin  40 mg Oral Daily    sodium chloride flush  10 mL Intravenous 2 times per day    enoxaparin  40 mg Subcutaneous Daily    insulin lispro  0-6 Units Subcutaneous TID WC    insulin lispro  0-3 Units Subcutaneous Nightly    losartan  50 mg Oral Daily    And    hydrochlorothiazide  12.5 mg Oral Daily    linagliptin  5 mg Oral Daily    And    metFORMIN  1,000 mg Oral Daily with breakfast     PRN Meds include: sodium chloride flush, potassium chloride **OR** potassium chloride **OR** potassium chloride, magnesium sulfate, acetaminophen, magnesium hydroxide, bisacodyl, ondansetron, nicotine, glucose, dextrose, glucagon (rDNA), dextrose    ROS:   Constitutional Negative for fever and chills   HEENT Negative for ear discharge, ear pain, nosebleed   Eyes Negative for photophobia, pain and discharge   Respiratory Negative for hemoptysis and sputum   Cardiovascular Negative for orthopnea, claudication and PND   Gastrointestinal Negative for abdominal pain, diarrhea, blood in stool   Musculoskeletal Negative for joint pain, negative for myalgia   Skin Negative for rash or itching   Endo/heme/allergies Negative for polydipsia, environmental allergy   Psychiatric Negative for suicidal ideation.   Patient is not anxious         Objective:   BP (!) 172/93   Pulse 74   Temp 97.9 °F (36.6 °C) (Oral)   Resp 16   Ht 5' 2\" (1.575

## 2018-03-16 NOTE — CARE COORDINATION
Dveen santos from [employer], ph [phone]. States he is a cousin of the pt, the other cousin is very hard to get ahold of.     1105 called [employer] at above number. Sami Mercer had called Jeramy and informed of situation. States he is a 2nd cousin, as pts grandmother and his grandmother were sisters. States they grew up in [place] and lost contact over the years. Jeramy states Jazmine Hawthorne is also a cousin and that pt gave him her business with the intention he would help her when she needs it. Jeramy states he trusts Sami Mercer, that pt had stated in the past that she would want to be at home if anything happens. Jeramy declines to make decisions for pt at this time. Encouraged to continue to locate Jazmine Hawthorne. Malu Madrigal states he will think about situation and would like to be updated. IMM:  Pt not able to sign IMM at this time. No DPOA or family member able to sign.

## 2018-03-16 NOTE — PROGRESS NOTES
bilaterally   No acute infarct, mass or hemorrhage. Physical Examination:        General appearance:  alert, cooperative and no distress  Mental Status:  oriented to self  Lungs:  clear to auscultation bilaterally, normal effort  Heart:  regular rate and rhythm, no murmur  Abdomen:  soft, nontender, nondistended, normal bowel sounds, no masses, hepatomegaly, splenomegaly  Extremities:  no edema, redness, tenderness in the calves  Skin:  no gross lesions, rashes, induration    Assessment:        Primary Problem  Altered mental status    Active Hospital Problems    Diagnosis Date Noted    Altered mental status [R41.82] 03/15/2018    Dementia without behavioral disturbance [F03.90] 03/15/2018    Essential hypertension [I10] 09/13/2016    Type 2 diabetes mellitus without complication, without long-term current use of insulin (HCC) [E11.9] 09/13/2016    Mixed hyperlipidemia [E78.2] 09/13/2016       Plan:        AMS, likely dementia 2/2 chronic microvascular changes associated with long-standing uncontrolled hypertension. Seems slowly progressing memory problem. Likely new diagnosis of dementia. MRI shows chronic microvascular changes.  evaluation after neurology evaluation. Trying to find family to aid in decision making. Apparently, her mother has been dead for 15 years, though the patient keeps talking about how she is still taking care of her mother. HTN, uncontrolled. On Losartan-HCTZ. Starting hydralazine today. DM2, controlled. Cont metformin, linagliptin    HLD.  Cont Lipitor    DVT PPx    Marylou Robert MD  3/16/2018  7:27 AM

## 2018-03-16 NOTE — PLAN OF CARE
Problem: Coping:  Goal: Ability to remain calm will improve  Ability to remain calm will improve   Outcome: Ongoing      Problem: Safety:  Goal: Ability to remain free from injury will improve  Ability to remain free from injury will improve   Outcome: Ongoing      Problem: Falls - Risk of  Goal: Absence of falls  Outcome: Met This Shift

## 2018-03-16 NOTE — CONSULTS
she quit smoking about 37 years ago. Her smoking use included Cigarettes. She quit after 20.00 years of use. She has never used smokeless tobacco.    Family History   Problem Relation Age of Onset    High Blood Pressure Mother        Current Medications:     hydrALAZINE  25 mg Oral 3 times per day    aspirin  81 mg Oral Daily    atorvastatin  40 mg Oral Daily    sodium chloride flush  10 mL Intravenous 2 times per day    enoxaparin  40 mg Subcutaneous Daily    insulin lispro  0-6 Units Subcutaneous TID WC    insulin lispro  0-3 Units Subcutaneous Nightly    losartan  50 mg Oral Daily    And    hydrochlorothiazide  12.5 mg Oral Daily    linagliptin  5 mg Oral Daily    And    metFORMIN  1,000 mg Oral Daily with breakfast     PRN Meds include: sodium chloride flush, potassium chloride **OR** potassium chloride **OR** potassium chloride, magnesium sulfate, acetaminophen, magnesium hydroxide, bisacodyl, ondansetron, nicotine, glucose, dextrose, glucagon (rDNA), dextrose    ROS:   Constitutional Negative for fever and chills   HEENT Negative for ear discharge, ear pain, nosebleed   Eyes Negative for photophobia, pain and discharge   Respiratory Negative for hemoptysis and sputum   Cardiovascular Negative for orthopnea, claudication and PND   Gastrointestinal Negative for abdominal pain, diarrhea, blood in stool   Musculoskeletal Negative for joint pain, negative for myalgia   Skin Negative for rash or itching   Endo/heme/allergies Negative for polydipsia, environmental allergy   Psychiatric Negative for suicidal ideation.   Patient is not anxious           Objective:   BP (!) 172/93   Pulse 74   Temp 97.9 °F (36.6 °C) (Oral)   Resp 16   Ht 5' 2\" (1.575 m)   Wt 162 lb 1.6 oz (73.5 kg)   SpO2 100%   BMI 29.65 kg/m²     Blood pressure range: Systolic (67SOO), AHM:067 , Min:136 , WDW:445   ; Diastolic (58OHU), URT:79, Min:68, Max:94      Continuous infusions:    dextrose         ON EXAMINATION:  GENERAL

## 2018-03-17 LAB
GLUCOSE BLD-MCNC: 113 MG/DL (ref 65–105)
GLUCOSE BLD-MCNC: 131 MG/DL (ref 65–105)
GLUCOSE BLD-MCNC: 137 MG/DL (ref 65–105)

## 2018-03-17 PROCEDURE — 80307 DRUG TEST PRSMV CHEM ANLYZR: CPT

## 2018-03-17 PROCEDURE — 6360000002 HC RX W HCPCS: Performed by: NURSE PRACTITIONER

## 2018-03-17 PROCEDURE — 97116 GAIT TRAINING THERAPY: CPT

## 2018-03-17 PROCEDURE — 97162 PT EVAL MOD COMPLEX 30 MIN: CPT

## 2018-03-17 PROCEDURE — 36415 COLL VENOUS BLD VENIPUNCTURE: CPT

## 2018-03-17 PROCEDURE — G0378 HOSPITAL OBSERVATION PER HR: HCPCS

## 2018-03-17 PROCEDURE — 99225 PR SBSQ OBSERVATION CARE/DAY 25 MINUTES: CPT | Performed by: NURSE PRACTITIONER

## 2018-03-17 PROCEDURE — 96372 THER/PROPH/DIAG INJ SC/IM: CPT

## 2018-03-17 PROCEDURE — G0480 DRUG TEST DEF 1-7 CLASSES: HCPCS

## 2018-03-17 PROCEDURE — G8979 MOBILITY GOAL STATUS: HCPCS

## 2018-03-17 PROCEDURE — 99225 PR SBSQ OBSERVATION CARE/DAY 25 MINUTES: CPT | Performed by: INTERNAL MEDICINE

## 2018-03-17 PROCEDURE — 6370000000 HC RX 637 (ALT 250 FOR IP): Performed by: NURSE PRACTITIONER

## 2018-03-17 PROCEDURE — G8978 MOBILITY CURRENT STATUS: HCPCS

## 2018-03-17 PROCEDURE — G8980 MOBILITY D/C STATUS: HCPCS

## 2018-03-17 PROCEDURE — 82947 ASSAY GLUCOSE BLOOD QUANT: CPT

## 2018-03-17 PROCEDURE — 6370000000 HC RX 637 (ALT 250 FOR IP): Performed by: INTERNAL MEDICINE

## 2018-03-17 RX ADMIN — ENOXAPARIN SODIUM 40 MG: 40 INJECTION SUBCUTANEOUS at 08:43

## 2018-03-17 RX ADMIN — HYDRALAZINE HYDROCHLORIDE 25 MG: 25 TABLET, FILM COATED ORAL at 17:17

## 2018-03-17 RX ADMIN — Medication 81 MG: at 08:43

## 2018-03-17 RX ADMIN — METFORMIN HYDROCHLORIDE 1000 MG: 500 TABLET ORAL at 08:43

## 2018-03-17 RX ADMIN — LINAGLIPTIN 5 MG: 5 TABLET, FILM COATED ORAL at 08:43

## 2018-03-17 RX ADMIN — HYDRALAZINE HYDROCHLORIDE 25 MG: 25 TABLET, FILM COATED ORAL at 06:46

## 2018-03-17 RX ADMIN — DESMOPRESSIN ACETATE 40 MG: 0.2 TABLET ORAL at 08:43

## 2018-03-17 RX ADMIN — LOSARTAN POTASSIUM 50 MG: 50 TABLET, FILM COATED ORAL at 08:43

## 2018-03-17 RX ADMIN — HYDROCHLOROTHIAZIDE 12.5 MG: 25 TABLET ORAL at 10:31

## 2018-03-17 ASSESSMENT — PAIN SCALES - GENERAL: PAINLEVEL_OUTOF10: 0

## 2018-03-17 NOTE — PROGRESS NOTES
pertinent surgical history. PHYSICAL EXAM:      Blood pressure (!) 173/89, pulse 81, temperature 97.5 °F (36.4 °C), temperature source Oral, resp. rate 16, height 5' 2\" (1.575 m), weight 162 lb 1.6 oz (73.5 kg), SpO2 93 %. Neurological Examination:  Mental status   Alert, awake; knew that she was in \"some sort of medical center\" in [facility], however, had no idea what the month, year or season was. Pt got very suspicious when I was asking her questions to check her orientation, memory & comprehension. She kept asking over & over again that who I work for? Why I am asking these questions? She asked if everyone is trying to kick her out of her own house? Couldn't spell the word \"school\" backwards, neither could she do any substraction; impaired short-term memory, normal speech & language; no hallucinations. Was also suspicious about who the sitter was & why he was there.  Telesitter & sitter at the bed side   Cranial nerves   II - visual fields intact to confrontation                                        III, IV, VI  extra-ocular muscles full: no RAJ, no nystagmus, no ptosis                                                     V - normal facial sensation                                                        VII - normal facial symmetry                                                       VIII - intact hearing                                                                     IX, X - symmetrical palate                                                          XI - symmetrical shoulder shrug                                                 XII - midline tongue without atrophy or fasciculation   Motor function  Normal muscle bulk and tone; normal power 5/5, including fine motor movements   Sensory function Grossly intact   Cerebellar No visible involuntary movements or tremors   Reflex function Intact 2+ DTR and symmetric with no pathologic reflex or Babinski sign   Gait                  Not tested       DATA

## 2018-03-17 NOTE — CARE COORDINATION
To the best of my knowledge there are 3 family members involved in this patient. Jenifer Tee [phone] 1st cousin  Michael Vazquez \"Dejuan\" [phone] 3rd cousin  Jacque Kim [phone] Magdalene Roy    She has a caregiver Simón Montoya [phone]    Cleamanda [place] to see patient Nilton Valentino and Valeriy Mcqueen are at bedside. Attempting to discuss discharge plans it becomes very clear to me that patient is not able to return to home. She is very confused, she denies having forgetful moments, but cannot remember the conversation from a few minutes ago. Dr. Mimi Quezada entered the room, Family ,Valeriy Mcqueen and myself wne tto the waiting area to discuss discharge. After a lengthy discussion with the present family I was able to confirm that there is no POA for financial or medical.  At this time Aram Moran will be making the decision for placement. Cornelius and Aram Moran discussed what the patient would like at [facility] states that the patient has told her many times in the past and after a visit to [facility] the patient has stated in the past that if she ever had to go to a facility that she would like to go there. Aram Moran agreed with [facility].   Referral made

## 2018-03-17 NOTE — PROGRESS NOTES
sodium chloride flush  10 mL Intravenous 2 times per day    enoxaparin  40 mg Subcutaneous Daily    insulin lispro  0-6 Units Subcutaneous TID WC    insulin lispro  0-3 Units Subcutaneous Nightly    losartan  50 mg Oral Daily    And    hydrochlorothiazide  12.5 mg Oral Daily    linagliptin  5 mg Oral Daily    And    metFORMIN  1,000 mg Oral Daily with breakfast     Continuous Infusions:    dextrose       PRN Meds: sodium chloride flush, potassium chloride **OR** potassium chloride **OR** potassium chloride, magnesium sulfate, acetaminophen, magnesium hydroxide, bisacodyl, ondansetron, nicotine, glucose, dextrose, glucagon (rDNA), dextrose    Data:     Past Medical History:   has a past medical history of Gout; Hyperlipidemia; Hypertension; and Type II or unspecified type diabetes mellitus without mention of complication, not stated as uncontrolled. Social History:   reports that she quit smoking about 37 years ago. Her smoking use included Cigarettes. She quit after 20.00 years of use. She has never used smokeless tobacco.     Family History:   Family History   Problem Relation Age of Onset    High Blood Pressure Mother        Vitals:  /67   Pulse 74   Temp 97.4 °F (36.3 °C) (Axillary)   Resp 16   Ht 5' 2\" (1.575 m)   Wt 162 lb 1.6 oz (73.5 kg)   SpO2 94%   BMI 29.65 kg/m²   Temp (24hrs), Av.6 °F (36.4 °C), Min:97.4 °F (36.3 °C), Max:97.9 °F (36.6 °C)    Recent Labs      18   0721  18   1121  18   1619  18   2057   POCGLU  94  126*  104  97       I/O (24Hr):     Intake/Output Summary (Last 24 hours) at 18 0729  Last data filed at 18 0600   Gross per 24 hour   Intake                0 ml   Output              550 ml   Net             -550 ml       Labs:    Hematology:  Recent Labs      18   1957  18   0415   WBC  5.9  5.1   RBC  4.32  3.91*   HGB  14.3  12.8   HCT  43.4  39.4   MCV  100.5  100.8   MCH  33.1  32.7   MCHC  32.9  32.5   RDW  14.0 matter    MRI OF THE BRAIN WITHOUT CONTRAST  3/15/2018 11:48 am  Impression Multifocal small-vessel ischemic change bilaterally   No acute infarct, mass or hemorrhage. Physical Examination:        General appearance:  alert, cooperative and no distress  Mental Status:  oriented to self  Lungs:  clear to auscultation bilaterally, normal effort  Heart:  regular rate and rhythm, no murmur  Abdomen:  soft, nontender, nondistended, normal bowel sounds, no masses, hepatomegaly, splenomegaly  Extremities:  no edema, redness, tenderness in the calves  Skin:  no gross lesions, rashes, induration    Assessment:        Primary Problem  Altered mental status    Active Hospital Problems    Diagnosis Date Noted    Altered mental status [R41.82] 03/15/2018    Dementia with behavioral disturbance [F03.91] 03/15/2018    Essential hypertension [I10] 09/13/2016    Type 2 diabetes mellitus without complication, without long-term current use of insulin (HCC) [E11.9] 09/13/2016    Mixed hyperlipidemia [E78.2] 09/13/2016       Plan:        AMS, likely dementia 2/2 chronic microvascular changes associated with long-standing uncontrolled hypertension. Seems slowly progressing memory problem. Likely new diagnosis of dementia. MRI shows chronic microvascular changes.  evaluation after neurology evaluation. Trying to find family to aid in decision making. Apparently, her mother has been dead for 15 years, though the patient keeps talking about how she is still taking care of her mother. EEG today, as per neurology. HTN, uncontrolled. On Losartan-HCTZ. Starting hydralazine today, with some improvement. DM2, controlled. Cont metformin, linagliptin    HLD.  Cont Lipitor    DVT PPx    Winifred Wilson MD  3/17/2018  7:29 AM

## 2018-03-18 LAB
GLUCOSE BLD-MCNC: 102 MG/DL (ref 65–105)
GLUCOSE BLD-MCNC: 110 MG/DL (ref 65–105)
GLUCOSE BLD-MCNC: 113 MG/DL (ref 65–105)

## 2018-03-18 PROCEDURE — 6360000002 HC RX W HCPCS: Performed by: NURSE PRACTITIONER

## 2018-03-18 PROCEDURE — 6370000000 HC RX 637 (ALT 250 FOR IP): Performed by: INTERNAL MEDICINE

## 2018-03-18 PROCEDURE — 96372 THER/PROPH/DIAG INJ SC/IM: CPT

## 2018-03-18 PROCEDURE — G0378 HOSPITAL OBSERVATION PER HR: HCPCS

## 2018-03-18 PROCEDURE — 82947 ASSAY GLUCOSE BLOOD QUANT: CPT

## 2018-03-18 PROCEDURE — 99225 PR SBSQ OBSERVATION CARE/DAY 25 MINUTES: CPT | Performed by: NURSE PRACTITIONER

## 2018-03-18 PROCEDURE — 99225 PR SBSQ OBSERVATION CARE/DAY 25 MINUTES: CPT | Performed by: INTERNAL MEDICINE

## 2018-03-18 PROCEDURE — 6370000000 HC RX 637 (ALT 250 FOR IP): Performed by: NURSE PRACTITIONER

## 2018-03-18 RX ADMIN — METFORMIN HYDROCHLORIDE 1000 MG: 500 TABLET ORAL at 08:18

## 2018-03-18 RX ADMIN — HYDRALAZINE HYDROCHLORIDE 25 MG: 25 TABLET, FILM COATED ORAL at 01:44

## 2018-03-18 RX ADMIN — DESMOPRESSIN ACETATE 40 MG: 0.2 TABLET ORAL at 08:18

## 2018-03-18 RX ADMIN — LINAGLIPTIN 5 MG: 5 TABLET, FILM COATED ORAL at 08:18

## 2018-03-18 RX ADMIN — HYDRALAZINE HYDROCHLORIDE 25 MG: 25 TABLET, FILM COATED ORAL at 17:13

## 2018-03-18 RX ADMIN — ENOXAPARIN SODIUM 40 MG: 40 INJECTION SUBCUTANEOUS at 08:18

## 2018-03-18 RX ADMIN — HYDRALAZINE HYDROCHLORIDE 25 MG: 25 TABLET, FILM COATED ORAL at 08:18

## 2018-03-18 RX ADMIN — HYDROCHLOROTHIAZIDE 12.5 MG: 25 TABLET ORAL at 08:17

## 2018-03-18 RX ADMIN — Medication 81 MG: at 08:18

## 2018-03-18 RX ADMIN — LOSARTAN POTASSIUM 50 MG: 50 TABLET, FILM COATED ORAL at 08:18

## 2018-03-18 RX ADMIN — INSULIN LISPRO 1 UNITS: 100 INJECTION, SOLUTION INTRAVENOUS; SUBCUTANEOUS at 20:55

## 2018-03-18 NOTE — PLAN OF CARE
Micheal Andrews 19    Second Visit Note  For more detailed information please refer to the progress note of the day      3/18/2018    4:10 PM    Name:   Antolin aMynard  MRN:     [id number]     Princesside:      [de-identified]   Room:   [facility]0161-01   Day:  1  Admit Date:  3/14/2018  5:34 PM    PCP:   Surya Johansen MD  Code Status:  Full Code        Pt vitals were reviewed   New labs were reviewed   Patient was seen    Updated plan :     1. Patient more confused, needs re-direction in conversation  2. She is to have EEG and Carotid U/S tomorrow  3.  Working on pre-cert for MARIA LUZ Cotto MD  3/18/2018  4:10 PM

## 2018-03-18 NOTE — PROGRESS NOTES
Net            -1250 ml       Labs:    Hematology:  Recent Labs      03/16/18   0415   WBC  5.1   RBC  3.91*   HGB  12.8   HCT  39.4   MCV  100.8   MCH  32.7   MCHC  32.5   RDW  13.8   PLT  203   MPV  10.2   INR  1.2     Chemistry:  Recent Labs      03/16/18   0415   NA  140   K  3.4*   CL  100   CO2  26   GLUCOSE  86   BUN  20   CREATININE  0.87   MG  2.0   ANIONGAP  14   LABGLOM  >60   GFRAA  >60   CALCIUM  9.2     Recent Labs      03/16/18   1619  03/16/18   2057  03/17/18   0840  03/17/18   1627  03/17/18   2034  03/18/18   0718   POCGLU  104  97  113*  137*  131*  110*         Lab Results   Component Value Date/Time    SPECIAL NOT REPORTED 03/14/2018 07:45 PM     Lab Results   Component Value Date/Time    CULTURE NO SIGNIFICANT GROWTH 03/14/2018 07:45 PM    CULTURE  03/14/2018 07:45 PM     [address] [phone]       No results found for: POCPH, PHART, PH, POCPCO2, BZA2DWD, PCO2, POCPO2, PO2ART, PO2, POCHCO3, PHC6ZEA, HCO3, NBEA, PBEA, BEART, BE, THGBART, THB, CRD0XMQ, MHQO2DPD, E7PDSVZH, O2SAT, FIO2    Radiology:    SINGLE VIEW OF THE CHEST   3/14/2018 7:57 pm  Impression Cardiomegaly. No radiographic evidence of acute pulmonary disease. CT OF THE HEAD WITHOUT CONTRAST  3/14/2018 9:22 pm  Impression No acute intracranial abnormality. Moderate chronic small vessel ischemic disease within the periventricular white matter    MRI OF THE BRAIN WITHOUT CONTRAST  3/15/2018 11:48 am  Impression Multifocal small-vessel ischemic change bilaterally   No acute infarct, mass or hemorrhage.       Physical Examination:        General appearance:  alert, cooperative and no distress  Mental Status:  oriented to self  Lungs:  clear to auscultation bilaterally, normal effort  Heart:  regular rate and rhythm, no murmur  Abdomen:  soft, nontender, nondistended, normal bowel sounds, no masses, hepatomegaly, splenomegaly  Extremities:  no edema, redness, tenderness in the

## 2018-03-18 NOTE — PROGRESS NOTES
NEUROLOGY INPATIENT PROGRESS NOTE    3/18/2018         Subjective: Som Juares is a  68 y.o. female admitted on 3/14/2018 with Altered mental status [R41.82]  Altered mental status [R41.82]. Chart reviewed. Discussed with RN. Patient examined. No acute events overnight. No new motor, sensory, visual or bulbar symptoms. Sitter and telesitter remain at the bedside. Patient remains alert but has been oriented only to self per RN. She is eating, drinking, and ambulating. Briefly, this is a  68 y.o. female with H/O gout, HLD, HTN, DM, who was admitted on 3/14/2018 with altered mentation. She was brought to the ED due to her caretaker stating she has had a steady decline in mentation over the past 2-3 months. The caretaker reported the patient was making up stories about people visiting her house, could not recall recent events, and also report increased confusion and an increase need in help performing ADLs. CT head no acute pathology. MRI brain with no acute pathology, bilateral small vessel ischemic changes. UA negative. UDS, carotid doppler, and EEG are pending. No current facility-administered medications on file prior to encounter. Current Outpatient Prescriptions on File Prior to Encounter   Medication Sig Dispense Refill    ASPIR-LOW 81 MG EC tablet TAKE ONE TABLET BY MOUTH DAILY 90 tablet 2    losartan-hydrochlorothiazide (HYZAAR) 50-12.5 MG per tablet TAKE ONE TABLET BY MOUTH DAILY 90 tablet 2    JANUMET  MG per tablet TAKE ONE TABLET BY MOUTH DAILY WITH SUPPER 60 tablet 2    atorvastatin (LIPITOR) 40 MG tablet Take 1 tablet by mouth daily 90 tablet 3       Allergies: Som Juares has No Known Allergies. Past Medical History:   Diagnosis Date    Gout     Hyperlipidemia     Hypertension     Type II or unspecified type diabetes mellitus without mention of complication, not stated as uncontrolled        History reviewed. No pertinent surgical history.     Social History: Ximena Doe  reports that she quit smoking about 37 years ago. Her smoking use included Cigarettes. She quit after 20.00 years of use. She has never used smokeless tobacco.    Family History   Problem Relation Age of Onset    High Blood Pressure Mother        Objective:   BP (!) 163/95   Pulse 80   Temp 97.7 °F (36.5 °C) (Oral)   Resp 22   Ht 5' 2\" (1.575 m)   Wt 156 lb 11.2 oz (71.1 kg)   SpO2 97%   BMI 28.66 kg/m²     Blood pressure range: Systolic (72LNA), VM , Min:130 , BI   ; Diastolic (49OTG), UBO:38, Min:65, Max:95      Review of Systems:  Constitutional  Negative for fever and chills    HEENT  Negative for ear discharge, ear pain, nosebleed    Eyes  Negative for photophobia, pain and discharge    Respiratory  Negative for hemoptysis and sputum    Cardiovascular  Negative for orthopnea, claudication and PND    Gastrointestinal  Negative for abdominal pain, diarrhea, blood in stool    Musculoskeletal  Negative for joint pain, negative for myalgia    Skin  Negative for rash or itching    Endo/heme/allergies  Negative for polydipsia, environmental allergy    Psychiatric/behavioral  Negative for suicidal ideation. Patient is not anxious        NEUROLOGIC EXAMINATION  GENERAL  Appears comfortable and in no distress   HEENT  NC/ AT   NECK  Supple   MENTAL STATUS:  Alert, oriented to self, city, \"hospital\" but doesn't know which one, not oriented to month or year,  normal speech, normal language, no hallucination or delusion. Can spell house forwards but not backwards. Intact naming. Cannot do serial subtractions. 0/3 recall after 3 minutes.     CRANIAL NERVES: II     -      Visual fields intact to confrontation  III,IV,VI -  EOMs full, no afferent defect, no RAJ, no ptosis  V     -     Normal facial sensation  VII    -     Normal facial symmetry  VIII   -     Intact hearing  IX,X -     Symmetrical palate  XI    -     Symmetrical shoulder shrug  XII   -     Midline tongue, no atrophy    MOTOR

## 2018-03-19 PROBLEM — F09 PROGRESSIVE COGNITIVE DYSFUNCTION: Status: ACTIVE | Noted: 2017-02-20

## 2018-03-19 LAB
ANION GAP SERPL CALCULATED.3IONS-SCNC: 18 MMOL/L (ref 9–17)
BUN BLDV-MCNC: 23 MG/DL (ref 8–23)
BUN/CREAT BLD: ABNORMAL (ref 9–20)
CALCIUM SERPL-MCNC: 10 MG/DL (ref 8.6–10.4)
CHLORIDE BLD-SCNC: 97 MMOL/L (ref 98–107)
CO2: 25 MMOL/L (ref 20–31)
CREAT SERPL-MCNC: 1.02 MG/DL (ref 0.5–0.9)
GFR AFRICAN AMERICAN: >60 ML/MIN
GFR NON-AFRICAN AMERICAN: 53 ML/MIN
GFR SERPL CREATININE-BSD FRML MDRD: ABNORMAL ML/MIN/{1.73_M2}
GFR SERPL CREATININE-BSD FRML MDRD: ABNORMAL ML/MIN/{1.73_M2}
GLUCOSE BLD-MCNC: 113 MG/DL (ref 65–105)
GLUCOSE BLD-MCNC: 119 MG/DL (ref 65–105)
GLUCOSE BLD-MCNC: 128 MG/DL (ref 65–105)
GLUCOSE BLD-MCNC: 146 MG/DL (ref 65–105)
GLUCOSE BLD-MCNC: 75 MG/DL (ref 65–105)
GLUCOSE BLD-MCNC: 98 MG/DL (ref 70–99)
METHYLMALONIC ACID: 0.17 UMOL/L (ref 0–0.4)
POTASSIUM SERPL-SCNC: 3.8 MMOL/L (ref 3.7–5.3)
SODIUM BLD-SCNC: 140 MMOL/L (ref 135–144)

## 2018-03-19 PROCEDURE — G0378 HOSPITAL OBSERVATION PER HR: HCPCS

## 2018-03-19 PROCEDURE — 93880 EXTRACRANIAL BILAT STUDY: CPT

## 2018-03-19 PROCEDURE — 80048 BASIC METABOLIC PNL TOTAL CA: CPT

## 2018-03-19 PROCEDURE — 36415 COLL VENOUS BLD VENIPUNCTURE: CPT

## 2018-03-19 PROCEDURE — 94762 N-INVAS EAR/PLS OXIMTRY CONT: CPT

## 2018-03-19 PROCEDURE — 96372 THER/PROPH/DIAG INJ SC/IM: CPT

## 2018-03-19 PROCEDURE — 6370000000 HC RX 637 (ALT 250 FOR IP): Performed by: INTERNAL MEDICINE

## 2018-03-19 PROCEDURE — 99225 PR SBSQ OBSERVATION CARE/DAY 25 MINUTES: CPT | Performed by: NURSE PRACTITIONER

## 2018-03-19 PROCEDURE — 6360000002 HC RX W HCPCS: Performed by: NURSE PRACTITIONER

## 2018-03-19 PROCEDURE — 6370000000 HC RX 637 (ALT 250 FOR IP): Performed by: NURSE PRACTITIONER

## 2018-03-19 PROCEDURE — 99225 PR SBSQ OBSERVATION CARE/DAY 25 MINUTES: CPT | Performed by: FAMILY MEDICINE

## 2018-03-19 PROCEDURE — 95816 EEG AWAKE AND DROWSY: CPT | Performed by: PSYCHIATRY & NEUROLOGY

## 2018-03-19 PROCEDURE — 95816 EEG AWAKE AND DROWSY: CPT

## 2018-03-19 PROCEDURE — 82947 ASSAY GLUCOSE BLOOD QUANT: CPT

## 2018-03-19 RX ADMIN — ENOXAPARIN SODIUM 40 MG: 40 INJECTION SUBCUTANEOUS at 08:56

## 2018-03-19 RX ADMIN — Medication 81 MG: at 08:55

## 2018-03-19 RX ADMIN — HYDROCHLOROTHIAZIDE 12.5 MG: 25 TABLET ORAL at 08:55

## 2018-03-19 RX ADMIN — HYDRALAZINE HYDROCHLORIDE 25 MG: 25 TABLET, FILM COATED ORAL at 08:55

## 2018-03-19 RX ADMIN — HYDRALAZINE HYDROCHLORIDE 25 MG: 25 TABLET, FILM COATED ORAL at 00:14

## 2018-03-19 RX ADMIN — LOSARTAN POTASSIUM 50 MG: 50 TABLET, FILM COATED ORAL at 08:55

## 2018-03-19 RX ADMIN — DESMOPRESSIN ACETATE 40 MG: 0.2 TABLET ORAL at 08:55

## 2018-03-19 RX ADMIN — LINAGLIPTIN 5 MG: 5 TABLET, FILM COATED ORAL at 08:55

## 2018-03-19 RX ADMIN — METFORMIN HYDROCHLORIDE 1000 MG: 500 TABLET ORAL at 08:55

## 2018-03-19 RX ADMIN — HYDRALAZINE HYDROCHLORIDE 25 MG: 25 TABLET, FILM COATED ORAL at 17:10

## 2018-03-19 RX ADMIN — ACETAMINOPHEN 650 MG: 325 TABLET ORAL at 03:14

## 2018-03-19 ASSESSMENT — ENCOUNTER SYMPTOMS
COUGH: 0
CONSTIPATION: 0
DIARRHEA: 0
WHEEZING: 0
VOMITING: 0
SHORTNESS OF BREATH: 0
NAUSEA: 0
ABDOMINAL PAIN: 0

## 2018-03-19 ASSESSMENT — PAIN SCALES - GENERAL
PAINLEVEL_OUTOF10: 0
PAINLEVEL_OUTOF10: 0
PAINLEVEL_OUTOF10: 2
PAINLEVEL_OUTOF10: 0

## 2018-03-19 NOTE — PROGRESS NOTES
pertinent surgical history. Social History: Lorrie Yusuf  reports that she quit smoking about 37 years ago. Her smoking use included Cigarettes. She quit after 20.00 years of use. She has never used smokeless tobacco.    Family History   Problem Relation Age of Onset    High Blood Pressure Mother        Objective:   /76   Pulse 75   Temp 98.5 °F (36.9 °C) (Oral)   Resp 16   Ht 5' 2\" (1.575 m)   Wt 156 lb 11.2 oz (71.1 kg)   SpO2 100%   BMI 28.66 kg/m²     Blood pressure range: Systolic (20DWR), MKH:108 , Min:110 , XWC:431   ; Diastolic (20FEN), VQU:74, Min:64, Max:85      Review of Systems:  Constitutional  Negative for fever and chills    HEENT  Negative for ear discharge, ear pain, nosebleed    Eyes  Negative for photophobia, pain and discharge    Respiratory  Negative for hemoptysis and sputum    Cardiovascular  Negative for orthopnea, claudication and PND    Gastrointestinal  Negative for abdominal pain, diarrhea, blood in stool    Musculoskeletal  Negative for joint pain, negative for myalgia    Skin  Negative for rash or itching    Endo/heme/allergies  Negative for polydipsia, environmental allergy    Psychiatric/behavioral  Negative for suicidal ideation. Patient is not anxious        NEUROLOGIC EXAMINATION  GENERAL  Appears comfortable and in no distress   HEENT  NC/ AT   NECK  Supple   MENTAL STATUS:  Alert, oriented to self, city, \"hospital\" but doesn't know which one, not oriented to month or year,  normal speech, normal language, no hallucination or delusion. Can spell house forwards but not backwards. Intact naming. 0/3 recall after 3 minutes.     CRANIAL NERVES: II     -      Visual fields intact to confrontation  III,IV,VI -  EOMs full, no afferent defect, no RAJ, no ptosis  V     -     Normal facial sensation  VII    -     Normal facial symmetry  VIII   -     Intact hearing  IX,X -     Symmetrical palate  XI    -     Symmetrical shoulder shrug  XII   -     Midline tongue, no atrophy    MOTOR FUNCTION:  significant for good strength of grade 5/5 in bilateral proximal and distal muscle groups of both upper and lower extremities with normal bulk, normal tone and no involuntary movements, no tremor   SENSORY FUNCTION:  Normal touch, normal pin   CEREBELLAR FUNCTION:  Intact fine motor control over upper limbs   REFLEX FUNCTION:  Symmetric, no perverted reflex, no Babinski sign   STATION and GAIT  Steady as she ambulates in her room       Data:    Lab Results:   CBC:   No results for input(s): WBC, HGB, PLT in the last 72 hours. BMP:    No results for input(s): NA, K, CL, CO2, BUN, CREATININE, GLUCOSE in the last 72 hours. Lab Results   Component Value Date    CHOL 137 10/19/2017    LDLCHOLESTEROL 34 10/19/2017    HDL 92 10/19/2017    TRIG 56 10/19/2017    ALT 16 03/14/2018    AST 22 03/14/2018    TSH 3.49 03/14/2018    INR 1.2 03/16/2018    LABA1C 5.9 10/19/2017    LABMICR 8 10/10/2016    [id number] 360 03/15/2018           Diagnostic data reviewed:  CT HEAD (3/14/18) -  No acute intracranial abnormality.       Moderate chronic small vessel ischemic disease within the periventricular   white matter         BRAIN MRI (3/15/18) -  Multifocal small-vessel ischemic change bilaterally       No acute infarct, mass or hemorrhage. CAROTID DOPPLER - pending        EEG - pending                    Impression and Plan: Ms. Arlen Torres is a 68 y.o. female with a 2-3 month history of progressive cognitive impairment. Brain imaging negative as above. B12, folate, TSH all normal. T. Pallidum nonreactive. EEG, carotid doppler, toxicology pending    Continue ASA 81mg qd, Lipitor 40mg qhs    Continue PT/OT; ambulates 150' with no device. Patient will need placement    Comorbid conditions - gout, HLD, HTN, DM    Will need to follow up with the neurology outpatient office in 4 weeks for further evaluation of cognitive impairment. Will follow with you while she is here.

## 2018-03-19 NOTE — PLAN OF CARE
Problem: Coping:  Goal: Ability to remain calm will improve  Ability to remain calm will improve   Outcome: Ongoing  Pt remains calm and cooperative  Intervention: Provide quiet time and decreased environmental stimulation  Ongoing   Intervention: Monitor pain status  Denies pain at all assessments. Problem: Safety:  Goal: Ability to remain free from injury will improve  Ability to remain free from injury will improve   Outcome: Ongoing  No injuries during hospitalization. Intervention: Assess risk factors for falls  Low-medium fall risk calculated  Intervention: Use safety precautions  Ongoing   Intervention: Provide surveillance of patient  Bedside safety guard and telesitter ongoing. Sitter only required to redirect pt to stay in room- elopement/wandering risk. Does not listen to telesitter's redirection. Problem: Self-Care:  Goal: Ability to participate in self-care as condition permits will improve  Ability to participate in self-care as condition permits will improve   Outcome: Completed Date Met: 03/19/18  Independent with all ADLs    Problem: Falls - Risk of  Intervention: Fall risk assessment  Low-medium fall risk calculated per Sorenson scale. Intervention: Fall precautions  Bed locked and low, call light within reach, nonskid footwear in place, floor cleared of obstacles, telesitter and bedside guard in use. Goal: Absence of falls  Outcome: Met This Shift  No falls to date.

## 2018-03-19 NOTE — PROGRESS NOTES
Physical Therapy  DATE: 3/19/2018    NAME: Dixon Brewer  MRN: [id number]   : 1940    Patient not seen this date for Physical Therapy due to:  [] Blood transfusion in progress  [] Hemodialysis  []  Patient Declined  [] Spine Precautions   [] Strict Bedrest  [] Surgery/ Procedure  [] Testing      [x] Other  Pt very anxious. Pt walking around room looking for her purse. Too distracted to work with PT.       [] PT being discontinued at this time. Patient independent. No further needs. [] PT being discontinued at this time as the patient has been transferred to palliative care. No further needs.     Miller Ace, PT

## 2018-03-19 NOTE — PROGRESS NOTES
symptoms at this time  3/19: EEG and carotid duplex pending    Review of Systems:   Review of Systems   Constitutional: Negative for chills, diaphoresis and fever. Respiratory: Negative for cough, shortness of breath and wheezing. Cardiovascular: Negative for chest pain, palpitations and leg swelling. Gastrointestinal: Negative for abdominal pain, constipation, diarrhea, nausea and vomiting. Genitourinary: Negative for urgency. Skin: Negative for rash. Neurological: Negative for tingling, focal weakness, weakness and headaches. Psychiatric/Behavioral: The patient is not nervous/anxious. Medications: Allergies:  No Known Allergies    Current Meds:   Scheduled Meds:    hydrALAZINE  25 mg Oral 3 times per day    aspirin  81 mg Oral Daily    atorvastatin  40 mg Oral Daily    sodium chloride flush  10 mL Intravenous 2 times per day    enoxaparin  40 mg Subcutaneous Daily    insulin lispro  0-6 Units Subcutaneous TID WC    insulin lispro  0-3 Units Subcutaneous Nightly    losartan  50 mg Oral Daily    And    hydrochlorothiazide  12.5 mg Oral Daily    linagliptin  5 mg Oral Daily    And    metFORMIN  1,000 mg Oral Daily with breakfast     Continuous Infusions:    dextrose       PRN Meds: sodium chloride flush, potassium chloride **OR** potassium chloride **OR** potassium chloride, magnesium sulfate, acetaminophen, magnesium hydroxide, bisacodyl, ondansetron, nicotine, glucose, dextrose, glucagon (rDNA), dextrose    Data:     Past Medical History:   has a past medical history of Gout; Hyperlipidemia; Hypertension; and Type II or unspecified type diabetes mellitus without mention of complication, not stated as uncontrolled. Social History:   reports that she quit smoking about 37 years ago. Her smoking use included Cigarettes. She quit after 20.00 years of use.  She has never used smokeless tobacco.     Family History:   Family History   Problem Relation Age of Onset    High Blood Pressure Mother        Vitals:  Patient Vitals for the past 24 hrs:   BP Temp Temp src Pulse Resp SpO2   18 0704 138/76 98.5 °F (36.9 °C) Oral 75 16 100 %   18 0000 110/64 - - 86 - -   18 (!) 177/85 97.7 °F (36.5 °C) Oral 79 16 97 %     Temp (24hrs), Av.1 °F (36.7 °C), Min:97.7 °F (36.5 °C), Max:98.5 °F (36.9 °C)    Recent Labs      18   1137  18   1711  18   1953  18   0849   POCGLU  102  113*  146*  113*       I/O (24Hr): Intake/Output Summary (Last 24 hours) at 18 1330  Last data filed at 18 0959   Gross per 24 hour   Intake              400 ml   Output              200 ml   Net              200 ml       Labs:  Recent Results (from the past 24 hour(s))   POC Glucose Fingerstick    Collection Time: 18  5:11 PM   Result Value Ref Range    POC Glucose 113 (H) 65 - 105 mg/dL   POC Glucose Fingerstick    Collection Time: 18  7:53 PM   Result Value Ref Range    POC Glucose 146 (H) 65 - 105 mg/dL   POC Glucose Fingerstick    Collection Time: 18  8:49 AM   Result Value Ref Range    POC Glucose 113 (H) 65 - 105 mg/dL       Radiology:    SINGLE VIEW OF THE CHEST   3/14/2018 7:57 pm  Impression Cardiomegaly. No radiographic evidence of acute pulmonary disease. CT OF THE HEAD WITHOUT CONTRAST  3/14/2018 9:22 pm  Impression No acute intracranial abnormality. Moderate chronic small vessel ischemic disease within the periventricular white matter     MRI OF THE BRAIN WITHOUT CONTRAST  3/15/2018 11:48 am  Impression Multifocal small-vessel ischemic change bilaterally   No acute infarct, mass or hemorrhage. Physical Examination:      Physical Exam   Constitutional: She is oriented to person, place, and time. She appears well-developed and well-nourished. HENT:   Head: Normocephalic and atraumatic. Eyes: EOM are normal. Right eye exhibits no discharge. Left eye exhibits no discharge. No scleral icterus. Neck: Neck supple.

## 2018-03-20 LAB
ACETAMINOPHEN LEVEL: <10 UG/ML (ref 10–30)
AMPHETAMINE: NEGATIVE NG/ML
BARBITURATES: NEGATIVE NG/ML
BENZODIAZEPINES: NEGATIVE NG/ML
BUPRENORPHINE: NEGATIVE NG/ML
COCAINE: NEGATIVE NG/ML
DRUGS OF ABUSE COMMENT: ABNORMAL
ETHANOL PERCENT: <0.01 %
ETHANOL: <10 MG/DL
GLUCOSE BLD-MCNC: 107 MG/DL (ref 65–105)
GLUCOSE BLD-MCNC: 110 MG/DL (ref 65–105)
METHADONE: NEGATIVE NG/ML
METHAMPHETAMINE: NEGATIVE NG/ML
OPIATES: NEGATIVE NG/ML
OXYCODONE: NEGATIVE NG/ML
PHENCYCLIDINE: NEGATIVE NG/ML
SALICYLATE LEVEL: <1 MG/DL (ref 3–10)
THC: NEGATIVE NG/ML
TOXIC TRICYCLIC SC,BLOOD: NEGATIVE

## 2018-03-20 PROCEDURE — 97116 GAIT TRAINING THERAPY: CPT

## 2018-03-20 PROCEDURE — 99225 PR SBSQ OBSERVATION CARE/DAY 25 MINUTES: CPT | Performed by: FAMILY MEDICINE

## 2018-03-20 PROCEDURE — 6360000002 HC RX W HCPCS: Performed by: NURSE PRACTITIONER

## 2018-03-20 PROCEDURE — 6370000000 HC RX 637 (ALT 250 FOR IP): Performed by: INTERNAL MEDICINE

## 2018-03-20 PROCEDURE — G0378 HOSPITAL OBSERVATION PER HR: HCPCS

## 2018-03-20 PROCEDURE — 96372 THER/PROPH/DIAG INJ SC/IM: CPT

## 2018-03-20 PROCEDURE — 99225 PR SBSQ OBSERVATION CARE/DAY 25 MINUTES: CPT | Performed by: NURSE PRACTITIONER

## 2018-03-20 PROCEDURE — 6370000000 HC RX 637 (ALT 250 FOR IP): Performed by: NURSE PRACTITIONER

## 2018-03-20 PROCEDURE — 82947 ASSAY GLUCOSE BLOOD QUANT: CPT

## 2018-03-20 RX ORDER — HYDRALAZINE HYDROCHLORIDE 50 MG/1
50 TABLET, FILM COATED ORAL EVERY 8 HOURS SCHEDULED
Status: DISCONTINUED | OUTPATIENT
Start: 2018-03-21 | End: 2018-03-21 | Stop reason: HOSPADM

## 2018-03-20 RX ORDER — LORAZEPAM 0.5 MG/1
0.5 TABLET ORAL EVERY 4 HOURS PRN
Status: DISCONTINUED | OUTPATIENT
Start: 2018-03-20 | End: 2018-03-21 | Stop reason: HOSPADM

## 2018-03-20 RX ADMIN — METFORMIN HYDROCHLORIDE 1000 MG: 500 TABLET ORAL at 08:53

## 2018-03-20 RX ADMIN — ENOXAPARIN SODIUM 40 MG: 40 INJECTION SUBCUTANEOUS at 08:54

## 2018-03-20 RX ADMIN — Medication 81 MG: at 08:53

## 2018-03-20 RX ADMIN — HYDRALAZINE HYDROCHLORIDE 25 MG: 25 TABLET, FILM COATED ORAL at 17:45

## 2018-03-20 RX ADMIN — LINAGLIPTIN 5 MG: 5 TABLET, FILM COATED ORAL at 08:53

## 2018-03-20 RX ADMIN — HYDRALAZINE HYDROCHLORIDE 25 MG: 25 TABLET, FILM COATED ORAL at 00:13

## 2018-03-20 RX ADMIN — HYDROCHLOROTHIAZIDE 12.5 MG: 25 TABLET ORAL at 08:53

## 2018-03-20 RX ADMIN — HYDRALAZINE HYDROCHLORIDE 25 MG: 25 TABLET, FILM COATED ORAL at 08:53

## 2018-03-20 RX ADMIN — INSULIN LISPRO 1 UNITS: 100 INJECTION, SOLUTION INTRAVENOUS; SUBCUTANEOUS at 21:22

## 2018-03-20 RX ADMIN — DESMOPRESSIN ACETATE 40 MG: 0.2 TABLET ORAL at 08:53

## 2018-03-20 RX ADMIN — LOSARTAN POTASSIUM 50 MG: 50 TABLET, FILM COATED ORAL at 08:53

## 2018-03-20 ASSESSMENT — ENCOUNTER SYMPTOMS
COUGH: 0
NAUSEA: 0
WHEEZING: 0
ABDOMINAL PAIN: 0
VOMITING: 0
CONSTIPATION: 0
DIARRHEA: 0
SHORTNESS OF BREATH: 0

## 2018-03-20 ASSESSMENT — PAIN SCALES - GENERAL
PAINLEVEL_OUTOF10: 0

## 2018-03-20 NOTE — PROGRESS NOTES
Physical Therapy  Facility/Department: [address] [facility] UN[facility]  Daily Treatment Note  NAME: Minor Fan  : 1940  MRN: [id number]    Date of Service: 3/20/2018    Patient Diagnosis(es):   Patient Active Problem List    Diagnosis Date Noted    Altered mental status 03/15/2018    Dementia with behavioral disturbance 03/15/2018    Progressive cognitive dysfunction 2017    Essential hypertension 2016    Type 2 diabetes mellitus without complication, without long-term current use of insulin ([facility] 2016    Mixed hyperlipidemia 2016    Non morbid obesity due to excess calories 2016       Past Medical History:   Diagnosis Date    Gout     Hyperlipidemia     Hypertension     Type II or unspecified type diabetes mellitus without mention of complication, not stated as uncontrolled      History reviewed. No pertinent surgical history. Restrictions  Restrictions/Precautions  Restrictions/Precautions: Fall Risk  Required Braces or Orthoses?: No  Subjective   General  Response To Previous Treatment: Patient with no complaints from previous session. Family / Caregiver Present: No  Subjective  Subjective: Pt pleasantly confused, demo some short term memory loss, perseverates on needing purse at all times  Pain Screening  Patient Currently in Pain: Denies  Vital Signs  Patient Currently in Pain: Denies       Orientation  Orientation  Overall Orientation Status: Impaired  Orientation Level: Disoriented to situation;Disoriented to time  Objective   Bed mobility  Rolling to Left: Independent  Rolling to Right: Independent  Supine to Sit: Independent  Sit to Supine: Independent  Scooting: Independent  Transfers  Sit to Stand: Independent  Stand to sit:  Independent  Ambulation  Ambulation?: Yes  Ambulation 1  Surface: level tile  Device: No Device  Assistance: Supervision  Quality of Gait: Good balance and endurance, occasinal cues for direction as pt did not know which room was hers  Distance: 400 ft  Stairs/Curb  Stairs?: No     Balance  Posture: Good  Sitting - Static: Good  Sitting - Dynamic: Good  Standing - Static: Good  Standing - Dynamic: Good    Exercise  Marching in place, weight shifting, bending/reaching out of [place], no LOB. Assessment   Body structures, Functions, Activity limitations: Decreased safe awareness;Decreased cognition  Assessment: pleasantly confused-risk for falls due to cognition, decreased safety awarness  Prognosis: Good  REQUIRES PT FOLLOW UP: Yes  Activity Tolerance  Activity Tolerance: Patient limited by cognitive status  PT Equipment Recommendations  Equipment Needed: No           Goals  Short term goals  Time Frame for Short term goals: 10  Short term goal 1: pt independent with tranfers  Short term goal 2: pt independent with ambulation without cues for redirectioning for safety, negotiation of space, return to room. Short term goal 3: pt to attempt stairs    Plan    Plan  Times per week: 5x/wk  Times per day: Daily  Current Treatment Recommendations: Functional Mobility Training, Stair training, Cognitive Reorientation, Patient/Caregiver Education & Training  Safety Devices  Type of devices:  All fall risk precautions in place, Patient at risk for falls, Sitter present, Nurse notified  Restraints  Initially in place: No     Therapy Time   Individual Concurrent Group Co-treatment   Time In 1426         Time Out 1444         Minutes Shane [address]

## 2018-03-20 NOTE — FLOWSHEET NOTE
Pt ambulatory, safely transferred to room 165 without incident. Have reminded pt several times of transfer and needs frequent reminders. Verbally agrees to stay in room while left alone. Telesitter continues, called and notified of room change. Pt goes thru her purse frequently, noted to have several bottles of her Rx meds. Meds removed from purse and placed in her large clothing bag which is in her closet. Safety guard remains at bedside with pt, will discontinue at shift change tonight and allow pt to be in room per self, as long as she remains safe overnight per Dr Marquise Plata she should be appropriate for discharge home tomorrow with family agreeing to help pt with ADLs and safety.  was updated on this plan.

## 2018-03-20 NOTE — PROGRESS NOTES
NEUROLOGY INPATIENT PROGRESS NOTE    3/20/2018         Subjective: Anthony Bradley is a  68 y.o. female admitted on 3/14/2018 with Altered mental status [R41.82]  Altered mental status [R41.82]  Progressive cognitive dysfunction [F09]  Altered mental status [R41.82]. Chart reviewed. Discussed with RN. Patient examined. No acute events overnight. No new motor, sensory, visual or bulbar symptoms. Sitter and telesitter remain at the bedside. She is eating, drinking, and ambulating well. Awaiting placement. Remains pleasantly confused. Briefly, this is a  68 y.o. female with H/O gout, HLD, HTN, DM, who was admitted on 3/14/2018 with altered mentation. She was brought to the ED due to her caretaker stating she has had a steady decline in mentation over the past 2-3 months. The caretaker reported the patient was making up stories about people visiting her house, could not recall recent events, and also report increased confusion and an increase need in help performing ADLs. CT head no acute pathology. MRI brain with no acute pathology, bilateral small vessel ischemic changes. UA negative. UDS negative. Carotid doppler with <50% stenosis bilateral ICA. EEG normal.    No current facility-administered medications on file prior to encounter. Current Outpatient Prescriptions on File Prior to Encounter   Medication Sig Dispense Refill    ASPIR-LOW 81 MG EC tablet TAKE ONE TABLET BY MOUTH DAILY 90 tablet 2    losartan-hydrochlorothiazide (HYZAAR) 50-12.5 MG per tablet TAKE ONE TABLET BY MOUTH DAILY 90 tablet 2    JANUMET  MG per tablet TAKE ONE TABLET BY MOUTH DAILY WITH SUPPER 60 tablet 2    atorvastatin (LIPITOR) 40 MG tablet Take 1 tablet by mouth daily 90 tablet 3       Allergies: Anthony Bradley has No Known Allergies.     Past Medical History:   Diagnosis Date    Gout     Hyperlipidemia     Hypertension     Type II or unspecified type diabetes mellitus without mention of complication, not above. B12, folate, TSH all normal. T. Pallidum nonreactive. Continue ASA 81mg qd, Lipitor 40mg qhs    Continue PT/OT; ambulates 150' with no device. Patient will need placement    Comorbid conditions - gout, HLD, HTN, DM    Will need to follow up with the neurology outpatient office in 4 weeks for further evaluation of cognitive impairment. Neurologically clear. Will follow with you while she is here.

## 2018-03-20 NOTE — CARE COORDINATION
Spoke with Tawanda Ford on phone. She would like a referral sent to [facility]. Referral sent. 0900 spoke with Azucena Woody at [facility]. She did receive referral.  Updated that pt does have a bedside sitter. She will call back after reviewing referral.    1100 spoke with pt and dilshad Kaiser at bedside at length regarding discharge plan. Pt is able to answer some questions appropriately. She does repeat herself at times and ask the same questions over. Pt states she wants to go home at discharge. Pt states she is not sure why Addis Farrell brought her to the hospital.  Informed pt she was admitted for altered mental status. Informed pt that CM was trying to work on a safe discharge plan. Pt lives alone in an apt. Addis Farrell does stay with her part of the day. Pt states she has a lot of neighbors that help her. Tawanda Ford confirms this. Tawanda Ford states she would be able to check in on Benigno Briceño but could not stay with her. Updated Tawanda Ford that [facility] was reviewing pt's case and would call CM back later today. Also updated that if they did accept pt she would then need to be approved by insurance for SNF stay. Updated that otherwise pt would be a private pay for SNF. Tawanda Ford states she wants to honor pt's wishes of going home but also wants a safe discharge. She is waiting to speak to pt's physician about test results before making a decision. Offered option of home care if pt did go home and pt states she does not want a visiting nurse. Tawanda Ford states that pt is a very private person. 1400 Dr Jeffrey Bean has spoken to Tawanda Ford. Plan is to monitor pt overnight without bedside guard and evaluate in the am.  Plan is for pt to return home. 1405 spoke with Azucena Woody from [facility]. She states that Tawanda Ford called her and stated pt would be going home tomorrow.

## 2018-03-20 NOTE — PROGRESS NOTES
microvascular disease. No other abnormality noted. She keeps insisting that she has no problem and wanted to go home. Denies any other symptoms at this time  3/19: EEG and carotid duplex are unremarkable    Review of Systems:   Review of Systems   Constitutional: Negative for chills, diaphoresis and fever. Respiratory: Negative for cough, shortness of breath and wheezing. Cardiovascular: Negative for chest pain, palpitations and leg swelling. Gastrointestinal: Negative for abdominal pain, constipation, diarrhea, nausea and vomiting. Genitourinary: Negative for urgency. Skin: Negative for rash. Neurological: Negative for tingling, focal weakness, weakness and headaches. Psychiatric/Behavioral: The patient is not nervous/anxious. Medications: Allergies:  No Known Allergies    Current Meds:   Scheduled Meds:    hydrALAZINE  25 mg Oral 3 times per day    aspirin  81 mg Oral Daily    atorvastatin  40 mg Oral Daily    sodium chloride flush  10 mL Intravenous 2 times per day    enoxaparin  40 mg Subcutaneous Daily    insulin lispro  0-6 Units Subcutaneous TID WC    insulin lispro  0-3 Units Subcutaneous Nightly    losartan  50 mg Oral Daily    And    hydrochlorothiazide  12.5 mg Oral Daily    linagliptin  5 mg Oral Daily    And    metFORMIN  1,000 mg Oral Daily with breakfast     Continuous Infusions:    dextrose       PRN Meds: sodium chloride flush, potassium chloride **OR** potassium chloride **OR** potassium chloride, magnesium sulfate, acetaminophen, magnesium hydroxide, bisacodyl, ondansetron, nicotine, glucose, dextrose, glucagon (rDNA), dextrose    Data:     Past Medical History:   has a past medical history of Gout; Hyperlipidemia; Hypertension; and Type II or unspecified type diabetes mellitus without mention of complication, not stated as uncontrolled. Social History:   reports that she quit smoking about 37 years ago. Her smoking use included Cigarettes.  She quit after VIEW OF THE CHEST   3/14/2018 7:57 pm  Impression Cardiomegaly. No radiographic evidence of acute pulmonary disease. CT OF THE HEAD WITHOUT CONTRAST  3/14/2018 9:22 pm  Impression No acute intracranial abnormality. Moderate chronic small vessel ischemic disease within the periventricular white matter     MRI OF THE BRAIN WITHOUT CONTRAST  3/15/2018 11:48 am  Impression Multifocal small-vessel ischemic change bilaterally   No acute infarct, mass or hemorrhage. Physical Examination:      Physical Exam   Constitutional: She is oriented to person, place, and time. She appears well-developed and well-nourished. HENT:   Head: Normocephalic and atraumatic. Eyes: EOM are normal. Right eye exhibits no discharge. Left eye exhibits no discharge. No scleral icterus. Neck: Neck supple. Cardiovascular: Normal rate, regular rhythm and normal heart sounds. Pulmonary/Chest: Effort normal and breath sounds normal. She has no wheezes. Abdominal: Soft. There is no tenderness. There is no guarding. Musculoskeletal: She exhibits no edema or tenderness. Neurological: She is alert and oriented to person, place, and time. Skin: Skin is warm. Psychiatric: Her behavior is normal.   Nursing note and vitals reviewed. Assessment:        Principal Problem:    Progressive cognitive dysfunction  Active Problems:    Essential hypertension    Type 2 diabetes mellitus without complication, without long-term current use of insulin (HCC)    Mixed hyperlipidemia    Altered mental status  Resolved Problems:    * No resolved hospital problems. *      Plan:          Dementia with microvascular changes  MRI brain, SLUMS score of 7,   Stable, B12, folate, TSH are normal.  EEG and carotid Doppler are pending.   Continue aspirin and statin    Hypertension: Continue hydralazine, lisinopril and hydrochlorothiazide    DM2: Controlled, continue home medications,  insulin sliding scale and hypoglycemia protocol. Hyperlipidemia: continue Lipitor    DVT and GI prophylaxis     Disposition: Plan for possible SNF placement, despite patient and cousin are requesting home placement I discussed with the that will try sitter free overnight then discuss in am. The fear is that she still won't be available 24/7 and safety still will be an issue. Consults: IP CONSULT TO INTERNAL MEDICINE  IP CONSULT TO HOSPITALIST  IP CONSULT TO SOCIAL WORK  IP CONSULT TO SOCIAL WORK  IP CONSULT TO NEUROLOGY    Time Spent on patient care is  31 mins in patient examination, evaluation, counseling as well as coordination of care . Directly related to the complexity of the case. Discussed care plan with nurse after getting input from the nurse.     Above plan discussed with the patient in room, who agreed to the above plan     Please call if any questions    Gregg Fuentes MD  [facility] Hospitalist  3/20/2018  7:37 AM

## 2018-03-20 NOTE — PLAN OF CARE
Problem: Coping:  Goal: Ability to remain calm will improve  Ability to remain calm will improve   Outcome: Ongoing      Problem: Falls - Risk of  Goal: Absence of falls  Outcome: Met This Shift      Problem: Neurological  Goal: Maximum potential motor/sensory/cognitive function  Outcome: Ongoing

## 2018-03-21 VITALS
HEIGHT: 62 IN | HEART RATE: 81 BPM | BODY MASS INDEX: 28.84 KG/M2 | DIASTOLIC BLOOD PRESSURE: 65 MMHG | TEMPERATURE: 98.6 F | WEIGHT: 156.7 LBS | RESPIRATION RATE: 18 BRPM | SYSTOLIC BLOOD PRESSURE: 135 MMHG | OXYGEN SATURATION: 99 %

## 2018-03-21 LAB
GLUCOSE BLD-MCNC: 113 MG/DL (ref 65–105)
GLUCOSE BLD-MCNC: 125 MG/DL (ref 65–105)
GLUCOSE BLD-MCNC: 125 MG/DL (ref 65–105)
GLUCOSE BLD-MCNC: 159 MG/DL (ref 65–105)
GLUCOSE BLD-MCNC: 96 MG/DL (ref 65–105)

## 2018-03-21 PROCEDURE — 6360000002 HC RX W HCPCS: Performed by: NURSE PRACTITIONER

## 2018-03-21 PROCEDURE — 82947 ASSAY GLUCOSE BLOOD QUANT: CPT

## 2018-03-21 PROCEDURE — 99225 PR SBSQ OBSERVATION CARE/DAY 25 MINUTES: CPT | Performed by: NURSE PRACTITIONER

## 2018-03-21 PROCEDURE — G0378 HOSPITAL OBSERVATION PER HR: HCPCS

## 2018-03-21 PROCEDURE — 6370000000 HC RX 637 (ALT 250 FOR IP): Performed by: NURSE PRACTITIONER

## 2018-03-21 PROCEDURE — 96372 THER/PROPH/DIAG INJ SC/IM: CPT

## 2018-03-21 PROCEDURE — 99225 PR SBSQ OBSERVATION CARE/DAY 25 MINUTES: CPT | Performed by: FAMILY MEDICINE

## 2018-03-21 RX ADMIN — LOSARTAN POTASSIUM 50 MG: 50 TABLET, FILM COATED ORAL at 08:48

## 2018-03-21 RX ADMIN — HYDRALAZINE HYDROCHLORIDE 50 MG: 50 TABLET, FILM COATED ORAL at 08:48

## 2018-03-21 RX ADMIN — HYDROCHLOROTHIAZIDE 12.5 MG: 25 TABLET ORAL at 08:48

## 2018-03-21 RX ADMIN — METFORMIN HYDROCHLORIDE 1000 MG: 500 TABLET ORAL at 08:48

## 2018-03-21 RX ADMIN — DESMOPRESSIN ACETATE 40 MG: 0.2 TABLET ORAL at 08:48

## 2018-03-21 RX ADMIN — LINAGLIPTIN 5 MG: 5 TABLET, FILM COATED ORAL at 08:48

## 2018-03-21 RX ADMIN — Medication 81 MG: at 08:48

## 2018-03-21 RX ADMIN — ENOXAPARIN SODIUM 40 MG: 40 INJECTION SUBCUTANEOUS at 08:48

## 2018-03-21 ASSESSMENT — ENCOUNTER SYMPTOMS
NAUSEA: 0
SHORTNESS OF BREATH: 0
DIARRHEA: 0
CONSTIPATION: 0
VOMITING: 0
WHEEZING: 0
COUGH: 0
ABDOMINAL PAIN: 0

## 2018-03-21 NOTE — PROGRESS NOTES
matter     MRI OF THE BRAIN WITHOUT CONTRAST  3/15/2018 11:48 am  Impression Multifocal small-vessel ischemic change bilaterally   No acute infarct, mass or hemorrhage. Physical Examination:      Physical Exam   Constitutional: She is oriented to person, place, and time. She appears well-developed and well-nourished. HENT:   Head: Normocephalic and atraumatic. Eyes: EOM are normal. Right eye exhibits no discharge. Left eye exhibits no discharge. No scleral icterus. Neck: Neck supple. Cardiovascular: Normal rate, regular rhythm and normal heart sounds. Pulmonary/Chest: Effort normal and breath sounds normal. She has no wheezes. Abdominal: Soft. There is no tenderness. There is no guarding. Musculoskeletal: She exhibits no edema or tenderness. Neurological: She is alert and oriented to person, place, and time. Skin: Skin is warm. Psychiatric: Her behavior is normal.   Nursing note and vitals reviewed. Assessment:        Principal Problem:    Progressive cognitive dysfunction  Active Problems:    Essential hypertension    Type 2 diabetes mellitus without complication, without long-term current use of insulin (HCC)    Mixed hyperlipidemia    Altered mental status  Resolved Problems:    * No resolved hospital problems. *      Plan:          Dementia with microvascular changes  MRI brain, SLUMS score of 7,   Stable, B12, folate, TSH are normal.  EEG and carotid Doppler are pending. Continue aspirin and statin    Hypertension: Continue hydralazine, lisinopril and hydrochlorothiazide    DM2: Controlled, continue home medications,  insulin sliding scale and hypoglycemia protocol. Hyperlipidemia: continue Lipitor    DVT and GI prophylaxis     Disposition:   despite patient and cousin are requesting home placement I discussed with cousin Gary Olvera that she must be available or other family member.    Plan for possible SNF placement, will need to check facilities with Dementia capabilities or

## 2018-03-21 NOTE — PROGRESS NOTES
03/19/2018    TSH 3.49 03/14/2018    INR 1.2 03/16/2018    LABA1C 5.9 10/19/2017    LABMICR 8 10/10/2016     Vit B12           360  Folate             19.5     T. Pallidum      Negative        CT HEAD (3/14/2018): No acute intracranial abnormalities    MRI BRAIN (3/15/2018): Multifocal small vessel ischemic changes bilaterally    CAROTID DOPPLER (3/19/2018): ICAs <50% stenosis    EEG (3/19/2018): Normal                        IMPRESSION & PLAN: 68 y.o.  female admitted with  AMS with poor memory over the last 1-2 months; not enough information to diagnose the type of cognitive impairment at this time. Will need further work up as an outpatient     Continue ASA 81 mg QD, Lipitor 40 mg HS    Continue PT/OT; she walked 400' without any device (3/20)     working with the extended family to find out a POA for further care/decisions    Comorbid conditions - HTN, HLD, DM, gout    Needs to F/U with Dr. Arnold Maurice in 4-6 weeks for further evaluation of cognitive impairment    Neurologically stable for D/C.  Pt wants to go home in the care of her cousin, Tiki Gallagher

## 2018-03-21 NOTE — PLAN OF CARE
Problem: Coping:  Goal: Ability to remain calm will improve  Ability to remain calm will improve   Outcome: Ongoing      Problem: Falls - Risk of  Goal: Absence of falls  Outcome: Ongoing

## 2018-03-22 ENCOUNTER — TELEPHONE (OUTPATIENT)
Dept: INTERNAL MEDICINE | Age: 78
End: 2018-03-22

## 2018-03-22 DIAGNOSIS — E78.2 MIXED HYPERLIPIDEMIA: ICD-10-CM

## 2018-03-22 RX ORDER — ATORVASTATIN CALCIUM 40 MG/1
TABLET, FILM COATED ORAL
Qty: 90 TABLET | Refills: 0 | Status: SHIPPED | OUTPATIENT
Start: 2018-03-22 | End: 2018-04-11 | Stop reason: SDUPTHER

## 2018-03-22 NOTE — PROGRESS NOTES
I have been called to bedside as Lou patient cousin wants to take her home, during the whole day she has been back and forth between SNF vs 24 hrs HH care but now she is adamant to go with patient wish and take her home, talked to both in details about major sefety risk. She signed to take the patient and leave  AMA.

## 2018-03-22 NOTE — DISCHARGE SUMMARY
Micheal Andrews 19    Discharge Summary     Patient ID: Radha Huang  :  1940   MRN: [id number]     ACCOUNT:  [de-identified]   Patient's PCP: Dahiana Suggs MD  Admit Date: 3/14/2018   Discharge Date: 3/21/2018     Length of Stay: 1  Code Status:  Prior  Admitting Physician: Daniela Fan MD  Discharge Physician: Daniela Fan MD     Active Discharge Diagnoses:     Primary Problem  Type 2 diabetes mellitus without complication, without long-term current use of insulin [facility])      [place] Problems    Diagnosis Date Noted    Dementia with behavioral disturbance [F03.91] 03/15/2018     Priority: High    Progressive cognitive dysfunction [F09] 2017     Priority: High    Type 2 diabetes mellitus without complication, without long-term current use of insulin ([facility] [address] [E11.9] 2016     Priority: High    Altered mental status [R41.82] 03/15/2018    Essential hypertension [I10] 2016    Mixed hyperlipidemia [E78.2] 2016       Admission Condition:  fair     Discharged Condition: [facility]    Hospital Stay:     Hospital Course:    Miss Isidro Frey is a very nice 68year old lady with history of diabetes, hypertension on medications at home. Lives at home on her own. Home care team noted patient to be more and more confused for past month with difficulty taking care of daily activities. Referred her to ER, patient her self denies having any issues. Obvious confusion and memory problems in examination prompting further AMS work up. CT and MRI brain with chronic microvascular disease. No other abnormality noted. She keeps insisting that she has no problem and wanted to go home. Denies any other symptoms at this time  3/19: EEG and carotid duplex are unremarkable  3/20:  SLUMS score of 7, sitter discontinued  3/21: patient cousin signed AMA papers.     Significant therapeutic interventions:      Dementia with microvascular changes  MRI brain, SLUMS score of 7,   Stable, B12, folate, TSH are normal.  EEG and carotid Doppler are pending. Continue aspirin and statin     Hypertension: Continue hydralazine, lisinopril and hydrochlorothiazide     DM2: Controlled, continue home medications,  insulin sliding scale and hypoglycemia protocol.     Hyperlipidemia: continue Lipitor     DVT and GI prophylaxis      Disposition:   despite patient and cousin are requesting home placement I discussed with cousin Tari Kennedy that she must be available or other family member. Plan for possible SNF placement, will need to check facilities with Dementia capabilities or else will need 24 hours HH care     I have been called to bedside as Lou patient cousin wants to take her home, during the whole day she has been back and forth between SNF vs 24 hrs HH care but now she is adamant to go with patient wish and take her home, talked to both in details about major sefety risk. She signed to take the patient and leave  AMA. Significant Diagnostic Studies:     Radiology:    Ct Head Wo Contrast    Result Date: 3/14/2018  EXAMINATION: CT OF THE HEAD WITHOUT CONTRAST  3/14/2018 9:22 pm TECHNIQUE: CT of the head was performed without the administration of intravenous contrast. Dose modulation, iterative reconstruction, and/or weight based adjustment of the mA/kV was utilized to reduce the radiation dose to as low as reasonably achievable. COMPARISON: None. HISTORY: ORDERING SYSTEM PROVIDED HISTORY: [facility] TECHNOLOGIST PROVIDED HISTORY: Has a \"code stroke\" or \"stroke alert\" been called? ->No FINDINGS: BRAIN/VENTRICLES: There is no acute intracranial hemorrhage, mass effect or midline shift. No abnormal extra-axial fluid collection. The gray-white differentiation is maintained without evidence of an acute infarct. There is no evidence of hydrocephalus.  Moderate chronic small vessel ischemic disease is identified within the periventricular white matter broadening without increased PSV Moderate: 50 - 69% PSV >125 - <230 cm/sec Severe: 70 - 99% PSV >230 cm/sec Critical: 80 - 99% PSV >230cm/sec and/or End Diastolic Velocities >814QR/ALC  Conclusions   Summary   Mild <50% stenosis of the internal carotid arteries bilaterally. Patent vertebral arteries bilaterally with antegrade flow. Signature   ----------------------------------------------------------------  Electronically signed by Edda Hinojosa(Sonographer) on  03/19/2018 08:49 AM  ----------------------------------------------------------------   ----------------------------------------------------------------  Electronically signed by Buster Short(Interpreting physician)  on 03/19/2018 09:37 PM  ----------------------------------------------------------------  Findings:   Right Impression:                    Left Impression:  Mild smooth heterogeneous plaque in  Mild smooth heterogeneous plaque in  the carotid bifurcation, internal    the carotid bifurcation, internal  carotid, arteries. carotid, arteries. Vertebral artery flow is antegrade . Vertebral artery flow is antegrade . Risk Factors   - The patient's risk factor(s) include: diabetes mellitus, dyslipidemia     and arterial hypertension. Velocities are measured in cm/s ; Diameters are measured in mm Carotid Right Measurements +------------+-------+-------+--------+-------+------------+---------------+ ! Location    ! PSV    ! EDV    ! Angle   ! RI     !%Stenosis   ! Tortuosity     ! +------------+-------+-------+--------+-------+------------+---------------+ ! Prox CCA    !61.6   !7.92   !0       !0.87   !            !               ! +------------+-------+-------+--------+-------+------------+---------------+ ! Mid CCA     !75.5   !14.2   !56      !0.81   !            !               ! +------------+-------+-------+--------+-------+------------+---------------+ ! Dist CCA    !79.2   !18.2   ! 60      !0.77   !            !               ! tissues demonstrate no acute abnormality. Multifocal small-vessel ischemic change bilaterally No acute infarct, mass or hemorrhage. Consultations:    Consults:     Final Specialist Recommendations/Findings:   IP CONSULT TO INTERNAL MEDICINE  IP CONSULT TO HOSPITALIST  IP CONSULT TO SOCIAL WORK  IP CONSULT TO SOCIAL WORK  IP CONSULT TO NEUROLOGY      The patient was seen and examined on day of discharge and this discharge summary is in conjunction with any daily progress note from day of discharge. Discharge plan:     Disposition: left AMA    Physician Follow Up: Ashley Pretty MD  [facility], [address] [place] [address]  [phone]    Schedule an appointment as soon as possible for a visit in 2 weeks  please follow up with your PCP within the next few weeks to update on hospitalization    Lory Zayas MD  [address]  [phone]    Schedule an appointment as soon as possible for a visit in 1 month  follow up with neurology in 4 weeks to re-evaluate symptoms         Electronically signed by   Christina Storey MD  3/21/2018  8:11 PM      Thank you Dr. Ashley Pretty MD for the opportunity to be involved in this patient's care.

## 2018-03-23 ENCOUNTER — HOSPITAL ENCOUNTER (OUTPATIENT)
Age: 78
Setting detail: OBSERVATION
Discharge: HOME HEALTH | End: 2018-03-26
Attending: EMERGENCY MEDICINE | Admitting: EMERGENCY MEDICINE
Payer: MEDICARE

## 2018-03-23 DIAGNOSIS — F03.91 DEMENTIA WITH BEHAVIORAL DISTURBANCE, UNSPECIFIED DEMENTIA TYPE: Primary | ICD-10-CM

## 2018-03-23 PROBLEM — F03.918 DEMENTIA WITH BEHAVIORAL PROBLEM: Status: ACTIVE | Noted: 2018-03-23

## 2018-03-23 LAB
ANION GAP SERPL CALCULATED.3IONS-SCNC: 16 MMOL/L (ref 9–17)
BUN BLDV-MCNC: 24 MG/DL (ref 8–23)
BUN/CREAT BLD: ABNORMAL (ref 9–20)
CALCIUM SERPL-MCNC: 9.6 MG/DL (ref 8.6–10.4)
CHLORIDE BLD-SCNC: 100 MMOL/L (ref 98–107)
CO2: 25 MMOL/L (ref 20–31)
CREAT SERPL-MCNC: 0.92 MG/DL (ref 0.5–0.9)
GFR AFRICAN AMERICAN: >60 ML/MIN
GFR NON-AFRICAN AMERICAN: 59 ML/MIN
GFR SERPL CREATININE-BSD FRML MDRD: ABNORMAL ML/MIN/{1.73_M2}
GFR SERPL CREATININE-BSD FRML MDRD: ABNORMAL ML/MIN/{1.73_M2}
GLUCOSE BLD-MCNC: 105 MG/DL (ref 70–99)
GLUCOSE BLD-MCNC: 121 MG/DL (ref 65–105)
POTASSIUM SERPL-SCNC: 4.3 MMOL/L (ref 3.7–5.3)
SODIUM BLD-SCNC: 141 MMOL/L (ref 135–144)

## 2018-03-23 PROCEDURE — G0378 HOSPITAL OBSERVATION PER HR: HCPCS

## 2018-03-23 PROCEDURE — 99285 EMERGENCY DEPT VISIT HI MDM: CPT

## 2018-03-23 PROCEDURE — 82947 ASSAY GLUCOSE BLOOD QUANT: CPT

## 2018-03-23 PROCEDURE — 96372 THER/PROPH/DIAG INJ SC/IM: CPT

## 2018-03-23 PROCEDURE — 2580000003 HC RX 258: Performed by: EMERGENCY MEDICINE

## 2018-03-23 PROCEDURE — 6370000000 HC RX 637 (ALT 250 FOR IP): Performed by: EMERGENCY MEDICINE

## 2018-03-23 PROCEDURE — 6360000002 HC RX W HCPCS: Performed by: EMERGENCY MEDICINE

## 2018-03-23 PROCEDURE — 80048 BASIC METABOLIC PNL TOTAL CA: CPT

## 2018-03-23 RX ORDER — LOSARTAN POTASSIUM AND HYDROCHLOROTHIAZIDE 12.5; 5 MG/1; MG/1
1 TABLET ORAL DAILY
Status: DISCONTINUED | OUTPATIENT
Start: 2018-03-23 | End: 2018-03-23

## 2018-03-23 RX ORDER — ASPIRIN 81 MG/1
81 TABLET ORAL DAILY
Status: DISCONTINUED | OUTPATIENT
Start: 2018-03-23 | End: 2018-03-26 | Stop reason: HOSPADM

## 2018-03-23 RX ORDER — ACETAMINOPHEN 325 MG/1
650 TABLET ORAL EVERY 4 HOURS PRN
Status: DISCONTINUED | OUTPATIENT
Start: 2018-03-23 | End: 2018-03-26 | Stop reason: HOSPADM

## 2018-03-23 RX ORDER — LOSARTAN POTASSIUM 50 MG/1
50 TABLET ORAL DAILY
Status: DISCONTINUED | OUTPATIENT
Start: 2018-03-23 | End: 2018-03-26 | Stop reason: HOSPADM

## 2018-03-23 RX ORDER — HYDROCHLOROTHIAZIDE 25 MG/1
12.5 TABLET ORAL DAILY
Status: DISCONTINUED | OUTPATIENT
Start: 2018-03-23 | End: 2018-03-26 | Stop reason: HOSPADM

## 2018-03-23 RX ORDER — SODIUM CHLORIDE 0.9 % (FLUSH) 0.9 %
10 SYRINGE (ML) INJECTION PRN
Status: DISCONTINUED | OUTPATIENT
Start: 2018-03-23 | End: 2018-03-26 | Stop reason: HOSPADM

## 2018-03-23 RX ORDER — ONDANSETRON 2 MG/ML
4 INJECTION INTRAMUSCULAR; INTRAVENOUS EVERY 6 HOURS PRN
Status: DISCONTINUED | OUTPATIENT
Start: 2018-03-23 | End: 2018-03-26 | Stop reason: HOSPADM

## 2018-03-23 RX ORDER — SODIUM CHLORIDE 0.9 % (FLUSH) 0.9 %
10 SYRINGE (ML) INJECTION EVERY 12 HOURS SCHEDULED
Status: DISCONTINUED | OUTPATIENT
Start: 2018-03-23 | End: 2018-03-26 | Stop reason: HOSPADM

## 2018-03-23 RX ORDER — ATORVASTATIN CALCIUM 40 MG/1
40 TABLET, FILM COATED ORAL NIGHTLY
Status: DISCONTINUED | OUTPATIENT
Start: 2018-03-23 | End: 2018-03-26 | Stop reason: HOSPADM

## 2018-03-23 RX ADMIN — HYDROCHLOROTHIAZIDE 12.5 MG: 25 TABLET ORAL at 22:28

## 2018-03-23 RX ADMIN — ASPIRIN 81 MG: 81 TABLET, COATED ORAL at 21:05

## 2018-03-23 RX ADMIN — Medication 10 ML: at 21:05

## 2018-03-23 RX ADMIN — ENOXAPARIN SODIUM 40 MG: 40 INJECTION SUBCUTANEOUS at 21:05

## 2018-03-23 RX ADMIN — DESMOPRESSIN ACETATE 40 MG: 0.2 TABLET ORAL at 21:05

## 2018-03-23 ASSESSMENT — PAIN DESCRIPTION - PAIN TYPE: TYPE: CHRONIC PAIN

## 2018-03-23 ASSESSMENT — ENCOUNTER SYMPTOMS
NAUSEA: 0
VOMITING: 0
ABDOMINAL PAIN: 0
SHORTNESS OF BREATH: 0

## 2018-03-23 ASSESSMENT — PAIN SCALES - GENERAL: PAINLEVEL_OUTOF10: 1

## 2018-03-23 NOTE — ED NOTES
Sw went to see patient with Uriel Carrillo @ bedside. Patient went home with POA the other day and now the POA is requesting that patient be placed in a SNF for patient safety. Patient has been fiddling with steel nail files and keeping them in her pocket making the caregiver concerned for patients and others safety. Patient has dementia and needs reminders to take medications and complete ADL's. Patient POA would like a referral sent to [facility] SNF to restart  process. MOLLY faxed referral and faxed POA papers to [facility]. Molly will continue to update facility while patient is in ER.

## 2018-03-24 LAB — GLUCOSE BLD-MCNC: 93 MG/DL (ref 65–105)

## 2018-03-24 PROCEDURE — 97166 OT EVAL MOD COMPLEX 45 MIN: CPT

## 2018-03-24 PROCEDURE — G8978 MOBILITY CURRENT STATUS: HCPCS

## 2018-03-24 PROCEDURE — 94762 N-INVAS EAR/PLS OXIMTRY CONT: CPT

## 2018-03-24 PROCEDURE — 97530 THERAPEUTIC ACTIVITIES: CPT

## 2018-03-24 PROCEDURE — G0378 HOSPITAL OBSERVATION PER HR: HCPCS

## 2018-03-24 PROCEDURE — 97535 SELF CARE MNGMENT TRAINING: CPT

## 2018-03-24 PROCEDURE — G8988 SELF CARE GOAL STATUS: HCPCS

## 2018-03-24 PROCEDURE — G8979 MOBILITY GOAL STATUS: HCPCS

## 2018-03-24 PROCEDURE — 6360000002 HC RX W HCPCS: Performed by: EMERGENCY MEDICINE

## 2018-03-24 PROCEDURE — G8987 SELF CARE CURRENT STATUS: HCPCS

## 2018-03-24 PROCEDURE — G8980 MOBILITY D/C STATUS: HCPCS

## 2018-03-24 PROCEDURE — 97161 PT EVAL LOW COMPLEX 20 MIN: CPT

## 2018-03-24 PROCEDURE — 82947 ASSAY GLUCOSE BLOOD QUANT: CPT

## 2018-03-24 PROCEDURE — 6370000000 HC RX 637 (ALT 250 FOR IP): Performed by: EMERGENCY MEDICINE

## 2018-03-24 PROCEDURE — 96372 THER/PROPH/DIAG INJ SC/IM: CPT

## 2018-03-24 RX ADMIN — LOSARTAN POTASSIUM 50 MG: 50 TABLET, FILM COATED ORAL at 09:28

## 2018-03-24 RX ADMIN — ASPIRIN 81 MG: 81 TABLET, COATED ORAL at 09:28

## 2018-03-24 RX ADMIN — ENOXAPARIN SODIUM 40 MG: 40 INJECTION SUBCUTANEOUS at 09:31

## 2018-03-24 RX ADMIN — LINAGLIPTIN 5 MG: 5 TABLET, FILM COATED ORAL at 09:28

## 2018-03-24 RX ADMIN — HYDROCHLOROTHIAZIDE 12.5 MG: 25 TABLET ORAL at 09:28

## 2018-03-24 RX ADMIN — METFORMIN HYDROCHLORIDE 1000 MG: 500 TABLET ORAL at 09:28

## 2018-03-24 ASSESSMENT — PAIN SCALES - GENERAL
PAINLEVEL_OUTOF10: 0
PAINLEVEL_OUTOF10: 0

## 2018-03-24 NOTE — PROGRESS NOTES
CDU Daily Progress Note  Attending Physician       Pt Name: Shayne Sierra  MRN: [id number]  Dorotagfbreanna 1940  Date of evaluation: 3/24/18    I performed a history and physical examination of the patient and discussed management with the resident. I reviewed the residents note and agree with the documented findings and plan of care. Any areas of disagreement are noted on the chart. I was personally present for the key portions of any procedures. I have documented in the chart those procedures where I was not present during the key portions. I have reviewed the emergency nurses triage note. I agree with the chief complaint, past medical history, past surgical history, allergies, medications, social and family history as documented unless otherwise noted below. Documentation of the HPI, Physical Exam and Medical Decision Making performed by medical students or scribes is based on my personal performance of the HPI, PE and MDM. For Physician Assistant/ Nurse Practitioner cases/documentation I have personally evaluated this patient and have completed at least one if not all key elements of the E/M (history, physical exam, and MDM). Additional findings are as noted. The Family History, Social History and Review of Systems are unchanged from the previous day. No significant events overnight. Elderly with dementia worsening in past week. Not taking her meds, confused at times. Niece who is power of  feels she is unsafe at home. PMHx; HTN, dyslipidemia, DM, Soc Serv consulted.      Raya Meyer MD  Attending Physician  Critical Decision Unit

## 2018-03-24 NOTE — H&P
>230 cm/sec Critical: 80 - 99% PSV >230cm/sec and/or End Diastolic Velocities >110MZ/IDALIA  Conclusions   Summary   Mild <50% stenosis of the internal carotid arteries bilaterally. Patent vertebral arteries bilaterally with antegrade flow. Signature   ----------------------------------------------------------------  Electronically signed by Edda Hinojosa(Sonographer) on  03/19/2018 08:49 AM  ----------------------------------------------------------------   ----------------------------------------------------------------  Electronically signed by Buster Short(Interpreting physician)  on 03/19/2018 09:37 PM  ----------------------------------------------------------------  Findings:   Right Impression:                    Left Impression:  Mild smooth heterogeneous plaque in  Mild smooth heterogeneous plaque in  the carotid bifurcation, internal    the carotid bifurcation, internal  carotid, arteries. carotid, arteries. Vertebral artery flow is antegrade . Vertebral artery flow is antegrade . Risk Factors   - The patient's risk factor(s) include: diabetes mellitus, dyslipidemia     and arterial hypertension. Velocities are measured in cm/s ; Diameters are measured in mm Carotid Right Measurements +------------+-------+-------+--------+-------+------------+---------------+ ! Location    ! PSV    ! EDV    ! Angle   ! RI     !%Stenosis   ! Tortuosity     ! +------------+-------+-------+--------+-------+------------+---------------+ ! Prox CCA    !61.6   !7.92   !0       !0.87   !            !               ! +------------+-------+-------+--------+-------+------------+---------------+ ! Mid CCA     !75.5   !14.2   !56      !0.81   !            !               ! +------------+-------+-------+--------+-------+------------+---------------+ ! Dist CCA    !79.2   !18.2   ! 60      !0.77   !            !               ! +------------+-------+-------+--------+-------+------------+---------------+ ! Bulb        !53.4 !            !               ! +------------+-------+-------+--------+-------+------------+---------------+ ! Mid ICA     !69.2   !21.1   ! 60      !0.7    ! !               ! +------------+-------+-------+--------+-------+------------+---------------+ ! Dist ICA    !78     !24.6   !60      !0.68   !            !               ! +------------+-------+-------+--------+-------+------------+---------------+ ! Prox ECA    !74.5   !11.7   !60      !0.84   !            !               ! +------------+-------+-------+--------+-------+------------+---------------+ ! Vertebral   !55.1   !9.38   !60      !0.83   !            !               ! +------------+-------+-------+--------+-------+------------+---------------+   - There is antegrade vertebral flow noted on the left side. - Additional Measurements:ICAPSV/CCAPSV 1.01. ICAEDV/CCAEDV 2.1. Mri Brain Wo Contrast    Result Date: 3/15/2018  EXAMINATION: MRI OF THE BRAIN WITHOUT CONTRAST  3/15/2018 11:48 am TECHNIQUE: Multiplanar multisequence MRI of the brain was performed without the administration of intravenous contrast. COMPARISON: None. HISTORY: ORDERING SYSTEM PROVIDED HISTORY: DEMENTIA, ALZHEIMERS POSSIBLE FINDINGS: INTRACRANIAL STRUCTURES/VENTRICLES: Ventricles and sulci are mildly prominent. Ventricles are midline. Mild patchy increased T2 and FLAIR signal is noted bilaterally in the periventricular and subcortical white matter. Multiple perivascular spaces are noted. There is no restricted diffusion signal.  There is no hemorrhage. There is no extra-axial collection. Flow voids are patent. ORBITS: The visualized portion of the orbits demonstrate no acute abnormality. SINUSES: The visualized paranasal sinuses and mastoid air cells are well aerated. BONES/SOFT TISSUES: The bone marrow signal intensity appears normal. The soft tissues demonstrate no acute abnormality.      Multifocal small-vessel ischemic change bilaterally No acute infarct, mass or

## 2018-03-25 LAB
GLUCOSE BLD-MCNC: 116 MG/DL (ref 65–105)
GLUCOSE BLD-MCNC: 180 MG/DL (ref 65–105)

## 2018-03-25 PROCEDURE — G0378 HOSPITAL OBSERVATION PER HR: HCPCS

## 2018-03-25 PROCEDURE — 6370000000 HC RX 637 (ALT 250 FOR IP): Performed by: EMERGENCY MEDICINE

## 2018-03-25 PROCEDURE — 6360000002 HC RX W HCPCS: Performed by: EMERGENCY MEDICINE

## 2018-03-25 PROCEDURE — 96372 THER/PROPH/DIAG INJ SC/IM: CPT

## 2018-03-25 PROCEDURE — 82947 ASSAY GLUCOSE BLOOD QUANT: CPT

## 2018-03-25 RX ADMIN — ASPIRIN 81 MG: 81 TABLET, COATED ORAL at 08:24

## 2018-03-25 RX ADMIN — LINAGLIPTIN 5 MG: 5 TABLET, FILM COATED ORAL at 08:24

## 2018-03-25 RX ADMIN — DESMOPRESSIN ACETATE 40 MG: 0.2 TABLET ORAL at 20:06

## 2018-03-25 RX ADMIN — HYDROCHLOROTHIAZIDE 12.5 MG: 25 TABLET ORAL at 08:24

## 2018-03-25 RX ADMIN — METFORMIN HYDROCHLORIDE 1000 MG: 500 TABLET ORAL at 08:24

## 2018-03-25 RX ADMIN — ENOXAPARIN SODIUM 40 MG: 40 INJECTION SUBCUTANEOUS at 08:24

## 2018-03-25 RX ADMIN — LOSARTAN POTASSIUM 50 MG: 50 TABLET, FILM COATED ORAL at 08:24

## 2018-03-25 ASSESSMENT — PAIN SCALES - GENERAL: PAINLEVEL_OUTOF10: 0

## 2018-03-25 NOTE — PLAN OF CARE
Problem: Safety:  Goal: Ability to contract for his/her safety will improve  Ability to contract for his/her safety will improve   Outcome: Ongoing      Problem: Falls - Risk of  Goal: Absence of falls  Outcome: Ongoing

## 2018-03-26 VITALS
TEMPERATURE: 97.7 F | OXYGEN SATURATION: 93 % | RESPIRATION RATE: 16 BRPM | HEART RATE: 80 BPM | BODY MASS INDEX: 28.52 KG/M2 | HEIGHT: 62 IN | SYSTOLIC BLOOD PRESSURE: 130 MMHG | WEIGHT: 155 LBS | DIASTOLIC BLOOD PRESSURE: 76 MMHG

## 2018-03-26 LAB — GLUCOSE BLD-MCNC: 109 MG/DL (ref 65–105)

## 2018-03-26 PROCEDURE — 6370000000 HC RX 637 (ALT 250 FOR IP): Performed by: EMERGENCY MEDICINE

## 2018-03-26 PROCEDURE — 97535 SELF CARE MNGMENT TRAINING: CPT

## 2018-03-26 PROCEDURE — 6360000002 HC RX W HCPCS: Performed by: EMERGENCY MEDICINE

## 2018-03-26 PROCEDURE — 94762 N-INVAS EAR/PLS OXIMTRY CONT: CPT

## 2018-03-26 PROCEDURE — G0378 HOSPITAL OBSERVATION PER HR: HCPCS

## 2018-03-26 PROCEDURE — 82947 ASSAY GLUCOSE BLOOD QUANT: CPT

## 2018-03-26 PROCEDURE — 96372 THER/PROPH/DIAG INJ SC/IM: CPT

## 2018-03-26 RX ADMIN — ASPIRIN 81 MG: 81 TABLET, COATED ORAL at 08:51

## 2018-03-26 RX ADMIN — LINAGLIPTIN 5 MG: 5 TABLET, FILM COATED ORAL at 08:49

## 2018-03-26 RX ADMIN — LOSARTAN POTASSIUM 50 MG: 50 TABLET, FILM COATED ORAL at 08:49

## 2018-03-26 RX ADMIN — METFORMIN HYDROCHLORIDE 1000 MG: 500 TABLET ORAL at 08:49

## 2018-03-26 RX ADMIN — ENOXAPARIN SODIUM 40 MG: 40 INJECTION SUBCUTANEOUS at 08:49

## 2018-03-26 RX ADMIN — HYDROCHLOROTHIAZIDE 12.5 MG: 25 TABLET ORAL at 08:48

## 2018-03-26 NOTE — PROGRESS NOTES
Smoking Cessation - topics covered   []  Health Risks  []  Benefits of Quitting   []  Smoking Cessation  []  Patient has no history of tobacco use  [x]  Patient is former smoker. Patient quit in 1980. [x]  No need for tobacco cessation education. []  Booklet given  []  Patient verbalizes understanding. []  Patient denies need for tobacco cessation education.   Marquise Moreira  9:15 AM

## 2018-03-26 NOTE — PROGRESS NOTES
Occupational Therapy  Facility/Department: [address] MED SURG  Daily Treatment Note  NAME: Florentino Sotos  : 1940  MRN: [id number]    Date of Service: 3/26/2018    Patient Diagnosis(es): The encounter diagnosis was Dementia with behavioral disturbance, unspecified dementia type. has a past medical history of Gout; Hyperlipidemia; Hypertension; and Type II or unspecified type diabetes mellitus without mention of complication, not stated as uncontrolled. has no past surgical history on file. Restrictions  Restrictions/Precautions  Restrictions/Precautions: General Precautions, Fall Risk  Position Activity Restriction  Other position/activity restrictions: OK for OOB   Subjective   General  Chart Reviewed: No  Patient assessed for rehabilitation services?: Yes  Family / Caregiver Present: No  Pain Assessment  Patient Currently in Pain: No  Vital Signs  Patient Currently in Pain: No   Orientation  Orientation  Overall Orientation Status: Impaired  Orientation Level: Oriented to person;Disoriented to time;Disoriented to place;Oriented to situation  Objective    Pt seated on EOB upon arrival. Pt engaging in self feeding task. Pt during the session was repeating information and asking writer same questions pt had previously asked noted. Pt pleasant and agreeable to demo transfers in room and complete simple grooming at sink. NO LOB. Pt lives alone and would need 24 hour supervision for safety reasons d/t pt demo short term memory deficits noted.     ADL  Feeding: Supervision (seated on EOB)  Grooming: Supervision (stood at sink to wash face and hands)   Balance  Sitting Balance: Independent (seated on EOB)  Standing Balance: Supervision  Standing Balance  Sit to stand: Supervision  Stand to sit: Supervision  Comment: no LOB     Transfers  Stand Step Transfers: Supervision  Sit to stand: Supervision  Stand to sit: Supervision  Attendance  Participation: Active participation      Assessment   Performance

## 2018-04-11 ENCOUNTER — OFFICE VISIT (OUTPATIENT)
Dept: FAMILY MEDICINE CLINIC | Age: 78
End: 2018-04-11
Payer: MEDICARE

## 2018-04-11 VITALS
OXYGEN SATURATION: 95 % | WEIGHT: 155.2 LBS | HEART RATE: 92 BPM | BODY MASS INDEX: 28.39 KG/M2 | SYSTOLIC BLOOD PRESSURE: 171 MMHG | DIASTOLIC BLOOD PRESSURE: 88 MMHG

## 2018-04-11 DIAGNOSIS — F01.518 VASCULAR DEMENTIA WITH BEHAVIOR DISTURBANCE: Primary | ICD-10-CM

## 2018-04-11 DIAGNOSIS — E11.9 TYPE 2 DIABETES MELLITUS WITHOUT COMPLICATION, WITHOUT LONG-TERM CURRENT USE OF INSULIN (HCC): ICD-10-CM

## 2018-04-11 DIAGNOSIS — E78.2 MIXED HYPERLIPIDEMIA: ICD-10-CM

## 2018-04-11 DIAGNOSIS — I10 ESSENTIAL HYPERTENSION: ICD-10-CM

## 2018-04-11 PROCEDURE — 99214 OFFICE O/P EST MOD 30 MIN: CPT | Performed by: PHYSICIAN ASSISTANT

## 2018-04-11 PROCEDURE — 1111F DSCHRG MED/CURRENT MED MERGE: CPT | Performed by: PHYSICIAN ASSISTANT

## 2018-04-11 RX ORDER — ATORVASTATIN CALCIUM 40 MG/1
TABLET, FILM COATED ORAL
Qty: 90 TABLET | Refills: 2 | Status: SHIPPED | OUTPATIENT
Start: 2018-04-11

## 2018-04-11 RX ORDER — LOSARTAN POTASSIUM AND HYDROCHLOROTHIAZIDE 12.5; 5 MG/1; MG/1
TABLET ORAL
Qty: 90 TABLET | Refills: 2 | Status: SHIPPED | OUTPATIENT
Start: 2018-04-11 | End: 2019-01-08 | Stop reason: SDUPTHER

## 2018-04-11 RX ORDER — ASPIRIN 81 MG/1
TABLET ORAL
Qty: 90 TABLET | Refills: 2 | Status: ON HOLD | OUTPATIENT
Start: 2018-04-11 | End: 2022-03-01 | Stop reason: HOSPADM

## 2018-04-15 ASSESSMENT — ENCOUNTER SYMPTOMS
SHORTNESS OF BREATH: 0
BACK PAIN: 0
VOMITING: 0
NAUSEA: 0

## 2019-01-08 DIAGNOSIS — I10 ESSENTIAL HYPERTENSION: ICD-10-CM

## 2019-01-08 RX ORDER — LOSARTAN POTASSIUM AND HYDROCHLOROTHIAZIDE 12.5; 5 MG/1; MG/1
1 TABLET ORAL DAILY
Qty: 30 TABLET | Refills: 1 | Status: ON HOLD | OUTPATIENT
Start: 2019-01-08 | End: 2022-02-26 | Stop reason: HOSPADM

## 2019-09-03 ENCOUNTER — TELEPHONE (OUTPATIENT)
Dept: PRIMARY CARE CLINIC | Age: 79
End: 2019-09-03

## 2021-08-30 ENCOUNTER — HOSPITAL ENCOUNTER (OUTPATIENT)
Age: 81
Setting detail: SPECIMEN
Discharge: HOME | End: 2021-08-30
Payer: MEDICARE

## 2021-08-30 LAB
ANION GAP SERPL CALCULATED.3IONS-SCNC: 21 MMOL/L (ref 9–17)
BUN BLDV-MCNC: 26 MG/DL (ref 8–23)
BUN/CREAT BLD: ABNORMAL (ref 9–20)
CALCIUM SERPL-MCNC: 10.7 MG/DL (ref 8.6–10.4)
CHLORIDE BLD-SCNC: 103 MMOL/L (ref 98–107)
CO2: 21 MMOL/L (ref 20–31)
CREAT SERPL-MCNC: 1.08 MG/DL (ref 0.5–0.9)
GFR AFRICAN AMERICAN: 59 ML/MIN
GFR NON-AFRICAN AMERICAN: 49 ML/MIN
GFR SERPL CREATININE-BSD FRML MDRD: ABNORMAL ML/MIN/{1.73_M2}
GFR SERPL CREATININE-BSD FRML MDRD: ABNORMAL ML/MIN/{1.73_M2}
GLUCOSE BLD-MCNC: 156 MG/DL (ref 70–99)
HCT VFR BLD CALC: 49.8 % (ref 36.3–47.1)
HEMOGLOBIN: 16.1 G/DL (ref 11.9–15.1)
MCH RBC QN AUTO: 30.8 PG (ref 25.2–33.5)
MCHC RBC AUTO-ENTMCNC: 32.3 G/DL (ref 28.4–34.8)
MCV RBC AUTO: 95.4 FL (ref 82.6–102.9)
NRBC AUTOMATED: 0 PER 100 WBC
PDW BLD-RTO: 14.4 % (ref 11.8–14.4)
PLATELET # BLD: 319 K/UL (ref 138–453)
PMV BLD AUTO: 11.1 FL (ref 8.1–13.5)
POTASSIUM SERPL-SCNC: 3.8 MMOL/L (ref 3.7–5.3)
RBC # BLD: 5.22 M/UL (ref 3.95–5.11)
SODIUM BLD-SCNC: 145 MMOL/L (ref 135–144)
WBC # BLD: 6.8 K/UL (ref 3.5–11.3)

## 2021-08-30 PROCEDURE — P9603 ONE-WAY ALLOW PRORATED MILES: HCPCS

## 2021-08-30 PROCEDURE — 85027 COMPLETE CBC AUTOMATED: CPT

## 2021-08-30 PROCEDURE — 36415 COLL VENOUS BLD VENIPUNCTURE: CPT

## 2021-08-30 PROCEDURE — 80048 BASIC METABOLIC PNL TOTAL CA: CPT

## 2021-09-07 ENCOUNTER — HOSPITAL ENCOUNTER (OUTPATIENT)
Age: 81
Setting detail: SPECIMEN
Discharge: HOME | End: 2021-09-07
Payer: MEDICARE

## 2021-09-07 LAB
ANION GAP SERPL CALCULATED.3IONS-SCNC: 16 MMOL/L (ref 9–17)
BUN BLDV-MCNC: 20 MG/DL (ref 8–23)
BUN/CREAT BLD: ABNORMAL (ref 9–20)
CALCIUM SERPL-MCNC: 9.5 MG/DL (ref 8.6–10.4)
CHLORIDE BLD-SCNC: 106 MMOL/L (ref 98–107)
CO2: 22 MMOL/L (ref 20–31)
CREAT SERPL-MCNC: 0.84 MG/DL (ref 0.5–0.9)
GFR AFRICAN AMERICAN: >60 ML/MIN
GFR NON-AFRICAN AMERICAN: >60 ML/MIN
GFR SERPL CREATININE-BSD FRML MDRD: ABNORMAL ML/MIN/{1.73_M2}
GFR SERPL CREATININE-BSD FRML MDRD: ABNORMAL ML/MIN/{1.73_M2}
GLUCOSE BLD-MCNC: 107 MG/DL (ref 70–99)
HCT VFR BLD CALC: 41.3 % (ref 36.3–47.1)
HEMOGLOBIN: 13.2 G/DL (ref 11.9–15.1)
MCH RBC QN AUTO: 30.9 PG (ref 25.2–33.5)
MCHC RBC AUTO-ENTMCNC: 32 G/DL (ref 28.4–34.8)
MCV RBC AUTO: 96.7 FL (ref 82.6–102.9)
NRBC AUTOMATED: 0 PER 100 WBC
PDW BLD-RTO: 14.3 % (ref 11.8–14.4)
PLATELET # BLD: 280 K/UL (ref 138–453)
PMV BLD AUTO: 11.6 FL (ref 8.1–13.5)
POTASSIUM SERPL-SCNC: 3.7 MMOL/L (ref 3.7–5.3)
RBC # BLD: 4.27 M/UL (ref 3.95–5.11)
SODIUM BLD-SCNC: 144 MMOL/L (ref 135–144)
WBC # BLD: 6.1 K/UL (ref 3.5–11.3)

## 2021-09-07 PROCEDURE — 36415 COLL VENOUS BLD VENIPUNCTURE: CPT

## 2021-09-07 PROCEDURE — P9603 ONE-WAY ALLOW PRORATED MILES: HCPCS

## 2021-09-07 PROCEDURE — 80048 BASIC METABOLIC PNL TOTAL CA: CPT

## 2021-09-07 PROCEDURE — 85027 COMPLETE CBC AUTOMATED: CPT

## 2022-02-17 ENCOUNTER — APPOINTMENT (OUTPATIENT)
Dept: GENERAL RADIOLOGY | Age: 82
DRG: 025 | End: 2022-02-17
Payer: MEDICARE

## 2022-02-17 ENCOUNTER — HOSPITAL ENCOUNTER (INPATIENT)
Age: 82
LOS: 12 days | Discharge: NURSING, REHAB OR LONG-TERM CARE | DRG: 025 | End: 2022-03-02
Attending: EMERGENCY MEDICINE | Admitting: INTERNAL MEDICINE
Payer: MEDICARE

## 2022-02-17 ENCOUNTER — APPOINTMENT (OUTPATIENT)
Dept: CT IMAGING | Age: 82
DRG: 025 | End: 2022-02-17
Payer: MEDICARE

## 2022-02-17 DIAGNOSIS — E11.9 TYPE 2 DIABETES MELLITUS WITHOUT COMPLICATION, WITHOUT LONG-TERM CURRENT USE OF INSULIN (HCC): ICD-10-CM

## 2022-02-17 DIAGNOSIS — Z86.79 S/P EVACUATION OF SUBDURAL HEMATOMA: ICD-10-CM

## 2022-02-17 DIAGNOSIS — Z98.890 S/P EVACUATION OF SUBDURAL HEMATOMA: ICD-10-CM

## 2022-02-17 DIAGNOSIS — I10 ESSENTIAL HYPERTENSION: ICD-10-CM

## 2022-02-17 DIAGNOSIS — S06.5XAA SUBDURAL HEMATOMA: Primary | ICD-10-CM

## 2022-02-17 LAB
ABSOLUTE EOS #: 0.22 K/UL (ref 0–0.44)
ABSOLUTE IMMATURE GRANULOCYTE: 0.03 K/UL (ref 0–0.3)
ABSOLUTE LYMPH #: 1.39 K/UL (ref 1.1–3.7)
ABSOLUTE MONO #: 0.53 K/UL (ref 0.1–1.2)
ALBUMIN SERPL-MCNC: 3.8 G/DL (ref 3.5–5.2)
ALBUMIN/GLOBULIN RATIO: 1.3 (ref 1–2.5)
ALLEN TEST: ABNORMAL
ALP BLD-CCNC: 72 U/L (ref 35–104)
ALT SERPL-CCNC: 12 U/L (ref 5–33)
ANION GAP SERPL CALCULATED.3IONS-SCNC: 17 MMOL/L (ref 9–17)
ANION GAP: 11 MMOL/L (ref 7–16)
AST SERPL-CCNC: 21 U/L
BASOPHILS # BLD: 0 % (ref 0–2)
BASOPHILS ABSOLUTE: <0.03 K/UL (ref 0–0.2)
BILIRUB SERPL-MCNC: 0.58 MG/DL (ref 0.3–1.2)
BUN BLDV-MCNC: 9 MG/DL (ref 8–23)
BUN/CREAT BLD: ABNORMAL (ref 9–20)
CALCIUM SERPL-MCNC: 9.9 MG/DL (ref 8.6–10.4)
CHLORIDE BLD-SCNC: 101 MMOL/L (ref 98–107)
CO2: 21 MMOL/L (ref 20–31)
CREAT SERPL-MCNC: 0.8 MG/DL (ref 0.5–0.9)
DIFFERENTIAL TYPE: ABNORMAL
DIRECT EXAM: NORMAL
EOSINOPHILS RELATIVE PERCENT: 3 % (ref 1–4)
FIO2: ABNORMAL
GFR AFRICAN AMERICAN: >60 ML/MIN
GFR NON-AFRICAN AMERICAN: >60 ML/MIN
GFR NON-AFRICAN AMERICAN: >60 ML/MIN
GFR SERPL CREATININE-BSD FRML MDRD: >60 ML/MIN
GFR SERPL CREATININE-BSD FRML MDRD: ABNORMAL ML/MIN/{1.73_M2}
GFR SERPL CREATININE-BSD FRML MDRD: ABNORMAL ML/MIN/{1.73_M2}
GFR SERPL CREATININE-BSD FRML MDRD: NORMAL ML/MIN/{1.73_M2}
GLUCOSE BLD-MCNC: 128 MG/DL (ref 70–99)
GLUCOSE BLD-MCNC: 129 MG/DL (ref 74–100)
HCO3 VENOUS: 24.6 MMOL/L (ref 22–29)
HCT VFR BLD CALC: 40.4 % (ref 36.3–47.1)
HEMOGLOBIN: 13.5 G/DL (ref 11.9–15.1)
IMMATURE GRANULOCYTES: 0 %
INR BLD: 1.2
LACTIC ACID, SEPSIS WHOLE BLOOD: 2.2 MMOL/L (ref 0.5–1.9)
LACTIC ACID, SEPSIS: ABNORMAL MMOL/L (ref 0.5–1.9)
LYMPHOCYTES # BLD: 20 % (ref 24–43)
Lab: NORMAL
MCH RBC QN AUTO: 31.5 PG (ref 25.2–33.5)
MCHC RBC AUTO-ENTMCNC: 33.4 G/DL (ref 28.4–34.8)
MCV RBC AUTO: 94.2 FL (ref 82.6–102.9)
MODE: ABNORMAL
MONOCYTES # BLD: 8 % (ref 3–12)
NEGATIVE BASE EXCESS, VEN: 1 (ref 0–2)
NRBC AUTOMATED: 0 PER 100 WBC
O2 DEVICE/FLOW/%: ABNORMAL
O2 SAT, VEN: 52 % (ref 60–85)
PARTIAL THROMBOPLASTIN TIME: 24.4 SEC (ref 20.5–30.5)
PATIENT TEMP: ABNORMAL
PCO2, VEN: 42.8 MM HG (ref 41–51)
PDW BLD-RTO: 15.9 % (ref 11.8–14.4)
PH VENOUS: 7.37 (ref 7.32–7.43)
PLATELET # BLD: 231 K/UL (ref 138–453)
PLATELET ESTIMATE: ABNORMAL
PMV BLD AUTO: 9.9 FL (ref 8.1–13.5)
PO2, VEN: 28.5 MM HG (ref 30–50)
POC BUN: 8 MG/DL (ref 8–26)
POC CHLORIDE: 107 MMOL/L (ref 98–107)
POC CREATININE: 0.71 MG/DL (ref 0.51–1.19)
POC HEMATOCRIT: 38 % (ref 36–46)
POC HEMOGLOBIN: 12.9 G/DL (ref 12–16)
POC IONIZED CALCIUM: 1.15 MMOL/L (ref 1.15–1.33)
POC LACTIC ACID: 1.85 MMOL/L (ref 0.56–1.39)
POC PCO2 TEMP: ABNORMAL MM HG
POC PH TEMP: ABNORMAL
POC PO2 TEMP: ABNORMAL MM HG
POC POTASSIUM: 3.3 MMOL/L (ref 3.5–4.5)
POC SODIUM: 142 MMOL/L (ref 138–146)
POC TCO2: 25 MMOL/L (ref 22–30)
POSITIVE BASE EXCESS, VEN: ABNORMAL (ref 0–3)
POTASSIUM SERPL-SCNC: 4 MMOL/L (ref 3.7–5.3)
PROTHROMBIN TIME: 12.4 SEC (ref 9.1–12.3)
RBC # BLD: 4.29 M/UL (ref 3.95–5.11)
RBC # BLD: ABNORMAL 10*6/UL
SAMPLE SITE: ABNORMAL
SARS-COV-2, RAPID: NOT DETECTED
SEG NEUTROPHILS: 69 % (ref 36–65)
SEGMENTED NEUTROPHILS ABSOLUTE COUNT: 4.67 K/UL (ref 1.5–8.1)
SODIUM BLD-SCNC: 139 MMOL/L (ref 135–144)
SPECIMEN DESCRIPTION: NORMAL
SPECIMEN DESCRIPTION: NORMAL
TOTAL CO2, VENOUS: ABNORMAL MMOL/L (ref 23–30)
TOTAL PROTEIN: 6.8 G/DL (ref 6.4–8.3)
TROPONIN INTERP: ABNORMAL
TROPONIN T: ABNORMAL NG/ML
TROPONIN, HIGH SENSITIVITY: 37 NG/L (ref 0–14)
WBC # BLD: 6.9 K/UL (ref 3.5–11.3)
WBC # BLD: ABNORMAL 10*3/UL

## 2022-02-17 PROCEDURE — 80051 ELECTROLYTE PANEL: CPT

## 2022-02-17 PROCEDURE — 82947 ASSAY GLUCOSE BLOOD QUANT: CPT

## 2022-02-17 PROCEDURE — 70450 CT HEAD/BRAIN W/O DYE: CPT

## 2022-02-17 PROCEDURE — P9612 CATHETERIZE FOR URINE SPEC: HCPCS

## 2022-02-17 PROCEDURE — 96361 HYDRATE IV INFUSION ADD-ON: CPT

## 2022-02-17 PROCEDURE — 85025 COMPLETE CBC W/AUTO DIFF WBC: CPT

## 2022-02-17 PROCEDURE — 85014 HEMATOCRIT: CPT

## 2022-02-17 PROCEDURE — 71045 X-RAY EXAM CHEST 1 VIEW: CPT

## 2022-02-17 PROCEDURE — 87804 INFLUENZA ASSAY W/OPTIC: CPT

## 2022-02-17 PROCEDURE — 85730 THROMBOPLASTIN TIME PARTIAL: CPT

## 2022-02-17 PROCEDURE — 80053 COMPREHEN METABOLIC PANEL: CPT

## 2022-02-17 PROCEDURE — 87635 SARS-COV-2 COVID-19 AMP PRB: CPT

## 2022-02-17 PROCEDURE — 93005 ELECTROCARDIOGRAM TRACING: CPT | Performed by: STUDENT IN AN ORGANIZED HEALTH CARE EDUCATION/TRAINING PROGRAM

## 2022-02-17 PROCEDURE — 96365 THER/PROPH/DIAG IV INF INIT: CPT

## 2022-02-17 PROCEDURE — 87040 BLOOD CULTURE FOR BACTERIA: CPT

## 2022-02-17 PROCEDURE — 6360000002 HC RX W HCPCS: Performed by: STUDENT IN AN ORGANIZED HEALTH CARE EDUCATION/TRAINING PROGRAM

## 2022-02-17 PROCEDURE — 99291 CRITICAL CARE FIRST HOUR: CPT | Performed by: PSYCHIATRY & NEUROLOGY

## 2022-02-17 PROCEDURE — 82565 ASSAY OF CREATININE: CPT

## 2022-02-17 PROCEDURE — 82803 BLOOD GASES ANY COMBINATION: CPT

## 2022-02-17 PROCEDURE — 83605 ASSAY OF LACTIC ACID: CPT

## 2022-02-17 PROCEDURE — 82330 ASSAY OF CALCIUM: CPT

## 2022-02-17 PROCEDURE — 85610 PROTHROMBIN TIME: CPT

## 2022-02-17 PROCEDURE — 84484 ASSAY OF TROPONIN QUANT: CPT

## 2022-02-17 PROCEDURE — 99284 EMERGENCY DEPT VISIT MOD MDM: CPT

## 2022-02-17 PROCEDURE — 2580000003 HC RX 258: Performed by: STUDENT IN AN ORGANIZED HEALTH CARE EDUCATION/TRAINING PROGRAM

## 2022-02-17 PROCEDURE — 84520 ASSAY OF UREA NITROGEN: CPT

## 2022-02-17 RX ORDER — LEVETIRACETAM 15 MG/ML
1500 INJECTION INTRAVASCULAR ONCE
Status: COMPLETED | OUTPATIENT
Start: 2022-02-17 | End: 2022-02-18

## 2022-02-17 RX ORDER — 0.9 % SODIUM CHLORIDE 0.9 %
1000 INTRAVENOUS SOLUTION INTRAVENOUS ONCE
Status: COMPLETED | OUTPATIENT
Start: 2022-02-17 | End: 2022-02-18

## 2022-02-17 RX ADMIN — SODIUM CHLORIDE 1000 ML: 9 INJECTION, SOLUTION INTRAVENOUS at 22:15

## 2022-02-17 RX ADMIN — LEVETIRACETAM 1500 MG: 15 INJECTION INTRAVENOUS at 23:04

## 2022-02-18 ENCOUNTER — APPOINTMENT (OUTPATIENT)
Dept: CT IMAGING | Age: 82
DRG: 025 | End: 2022-02-18
Payer: MEDICARE

## 2022-02-18 PROBLEM — S06.5XAA SDH (SUBDURAL HEMATOMA): Status: ACTIVE | Noted: 2022-02-18

## 2022-02-18 LAB
ABSOLUTE EOS #: 0.29 K/UL (ref 0–0.44)
ABSOLUTE IMMATURE GRANULOCYTE: <0.03 K/UL (ref 0–0.3)
ABSOLUTE LYMPH #: 1.59 K/UL (ref 1.1–3.7)
ABSOLUTE MONO #: 0.44 K/UL (ref 0.1–1.2)
ANION GAP SERPL CALCULATED.3IONS-SCNC: 13 MMOL/L (ref 9–17)
BASOPHILS # BLD: 1 % (ref 0–2)
BASOPHILS ABSOLUTE: 0.03 K/UL (ref 0–0.2)
BUN BLDV-MCNC: 7 MG/DL (ref 8–23)
BUN/CREAT BLD: ABNORMAL (ref 9–20)
CALCIUM SERPL-MCNC: 9.4 MG/DL (ref 8.6–10.4)
CHLORIDE BLD-SCNC: 106 MMOL/L (ref 98–107)
CO2: 20 MMOL/L (ref 20–31)
COLLAGEN ADENOSINE-5'-DIPHOSPHATE (ADP) TIME: 102 SEC (ref 67–112)
COLLAGEN EPINEPHRINE TIME: 119 SEC (ref 85–172)
CREAT SERPL-MCNC: 0.58 MG/DL (ref 0.5–0.9)
DIFFERENTIAL TYPE: ABNORMAL
EKG ATRIAL RATE: 77 BPM
EKG P AXIS: 61 DEGREES
EKG P-R INTERVAL: 180 MS
EKG Q-T INTERVAL: 416 MS
EKG QRS DURATION: 80 MS
EKG QTC CALCULATION (BAZETT): 470 MS
EKG R AXIS: -49 DEGREES
EKG T AXIS: 17 DEGREES
EKG VENTRICULAR RATE: 77 BPM
EOSINOPHILS RELATIVE PERCENT: 6 % (ref 1–4)
GFR AFRICAN AMERICAN: >60 ML/MIN
GFR NON-AFRICAN AMERICAN: >60 ML/MIN
GFR SERPL CREATININE-BSD FRML MDRD: ABNORMAL ML/MIN/{1.73_M2}
GFR SERPL CREATININE-BSD FRML MDRD: ABNORMAL ML/MIN/{1.73_M2}
GLUCOSE BLD-MCNC: 92 MG/DL (ref 70–99)
HCT VFR BLD CALC: 41.1 % (ref 36.3–47.1)
HEMOGLOBIN: 13.7 G/DL (ref 11.9–15.1)
IMMATURE GRANULOCYTES: 0 %
LACTIC ACID, SEPSIS WHOLE BLOOD: 1.1 MMOL/L (ref 0.5–1.9)
LACTIC ACID, SEPSIS: NORMAL MMOL/L (ref 0.5–1.9)
LYMPHOCYTES # BLD: 34 % (ref 24–43)
MCH RBC QN AUTO: 31.4 PG (ref 25.2–33.5)
MCHC RBC AUTO-ENTMCNC: 33.3 G/DL (ref 28.4–34.8)
MCV RBC AUTO: 94.3 FL (ref 82.6–102.9)
MONOCYTES # BLD: 9 % (ref 3–12)
NRBC AUTOMATED: 0 PER 100 WBC
PDW BLD-RTO: 15.5 % (ref 11.8–14.4)
PLATELET # BLD: 213 K/UL (ref 138–453)
PLATELET ESTIMATE: ABNORMAL
PLATELET FUNCTION INTERP: NORMAL
PMV BLD AUTO: 9.1 FL (ref 8.1–13.5)
POTASSIUM SERPL-SCNC: 3.8 MMOL/L (ref 3.7–5.3)
RBC # BLD: 4.36 M/UL (ref 3.95–5.11)
RBC # BLD: ABNORMAL 10*6/UL
SEG NEUTROPHILS: 50 % (ref 36–65)
SEGMENTED NEUTROPHILS ABSOLUTE COUNT: 2.33 K/UL (ref 1.5–8.1)
SODIUM BLD-SCNC: 139 MMOL/L (ref 135–144)
WBC # BLD: 4.7 K/UL (ref 3.5–11.3)
WBC # BLD: ABNORMAL 10*3/UL

## 2022-02-18 PROCEDURE — 6360000002 HC RX W HCPCS: Performed by: STUDENT IN AN ORGANIZED HEALTH CARE EDUCATION/TRAINING PROGRAM

## 2022-02-18 PROCEDURE — 87040 BLOOD CULTURE FOR BACTERIA: CPT

## 2022-02-18 PROCEDURE — 70450 CT HEAD/BRAIN W/O DYE: CPT

## 2022-02-18 PROCEDURE — 83605 ASSAY OF LACTIC ACID: CPT

## 2022-02-18 PROCEDURE — 85025 COMPLETE CBC W/AUTO DIFF WBC: CPT

## 2022-02-18 PROCEDURE — 6370000000 HC RX 637 (ALT 250 FOR IP): Performed by: STUDENT IN AN ORGANIZED HEALTH CARE EDUCATION/TRAINING PROGRAM

## 2022-02-18 PROCEDURE — 99233 SBSQ HOSP IP/OBS HIGH 50: CPT | Performed by: PSYCHIATRY & NEUROLOGY

## 2022-02-18 PROCEDURE — 94761 N-INVAS EAR/PLS OXIMETRY MLT: CPT

## 2022-02-18 PROCEDURE — 2000000003 HC NEURO ICU R&B

## 2022-02-18 PROCEDURE — 2700000000 HC OXYGEN THERAPY PER DAY

## 2022-02-18 PROCEDURE — 36415 COLL VENOUS BLD VENIPUNCTURE: CPT

## 2022-02-18 PROCEDURE — 85576 BLOOD PLATELET AGGREGATION: CPT

## 2022-02-18 PROCEDURE — 80048 BASIC METABOLIC PNL TOTAL CA: CPT

## 2022-02-18 PROCEDURE — 6360000002 HC RX W HCPCS

## 2022-02-18 PROCEDURE — 2580000003 HC RX 258: Performed by: STUDENT IN AN ORGANIZED HEALTH CARE EDUCATION/TRAINING PROGRAM

## 2022-02-18 RX ORDER — MIDAZOLAM HYDROCHLORIDE 1 MG/ML
INJECTION INTRAMUSCULAR; INTRAVENOUS
Status: COMPLETED
Start: 2022-02-18 | End: 2022-02-18

## 2022-02-18 RX ORDER — ACETAMINOPHEN 325 MG/1
650 TABLET ORAL EVERY 6 HOURS PRN
Status: DISCONTINUED | OUTPATIENT
Start: 2022-02-18 | End: 2022-03-02 | Stop reason: HOSPADM

## 2022-02-18 RX ORDER — ONDANSETRON 2 MG/ML
4 INJECTION INTRAMUSCULAR; INTRAVENOUS EVERY 6 HOURS PRN
Status: DISCONTINUED | OUTPATIENT
Start: 2022-02-18 | End: 2022-03-02 | Stop reason: HOSPADM

## 2022-02-18 RX ORDER — SODIUM CHLORIDE 0.9 % (FLUSH) 0.9 %
5-40 SYRINGE (ML) INJECTION PRN
Status: DISCONTINUED | OUTPATIENT
Start: 2022-02-18 | End: 2022-03-02 | Stop reason: HOSPADM

## 2022-02-18 RX ORDER — SODIUM CHLORIDE 9 MG/ML
25 INJECTION, SOLUTION INTRAVENOUS PRN
Status: DISCONTINUED | OUTPATIENT
Start: 2022-02-18 | End: 2022-03-02 | Stop reason: HOSPADM

## 2022-02-18 RX ORDER — ALOGLIPTIN 12.5 MG/1
12.5 TABLET, FILM COATED ORAL DAILY
Status: DISCONTINUED | OUTPATIENT
Start: 2022-02-18 | End: 2022-03-02 | Stop reason: HOSPADM

## 2022-02-18 RX ORDER — ONDANSETRON 4 MG/1
4 TABLET, ORALLY DISINTEGRATING ORAL EVERY 8 HOURS PRN
Status: DISCONTINUED | OUTPATIENT
Start: 2022-02-18 | End: 2022-03-02 | Stop reason: HOSPADM

## 2022-02-18 RX ORDER — ATORVASTATIN CALCIUM 40 MG/1
40 TABLET, FILM COATED ORAL DAILY
Status: DISCONTINUED | OUTPATIENT
Start: 2022-02-18 | End: 2022-03-02 | Stop reason: HOSPADM

## 2022-02-18 RX ORDER — POLYETHYLENE GLYCOL 3350 17 G/17G
17 POWDER, FOR SOLUTION ORAL DAILY PRN
Status: DISCONTINUED | OUTPATIENT
Start: 2022-02-18 | End: 2022-03-02 | Stop reason: HOSPADM

## 2022-02-18 RX ORDER — MIDAZOLAM HYDROCHLORIDE 2 MG/2ML
2 INJECTION, SOLUTION INTRAMUSCULAR; INTRAVENOUS ONCE
Status: COMPLETED | OUTPATIENT
Start: 2022-02-19 | End: 2022-02-18

## 2022-02-18 RX ORDER — SODIUM CHLORIDE 0.9 % (FLUSH) 0.9 %
5-40 SYRINGE (ML) INJECTION EVERY 12 HOURS SCHEDULED
Status: DISCONTINUED | OUTPATIENT
Start: 2022-02-18 | End: 2022-03-02 | Stop reason: HOSPADM

## 2022-02-18 RX ORDER — HYDROCHLOROTHIAZIDE 25 MG/1
12.5 TABLET ORAL DAILY
Status: DISCONTINUED | OUTPATIENT
Start: 2022-02-18 | End: 2022-02-22

## 2022-02-18 RX ORDER — LOSARTAN POTASSIUM AND HYDROCHLOROTHIAZIDE 12.5; 5 MG/1; MG/1
1 TABLET ORAL DAILY
Status: DISCONTINUED | OUTPATIENT
Start: 2022-02-18 | End: 2022-02-18

## 2022-02-18 RX ORDER — SODIUM CHLORIDE 9 MG/ML
INJECTION, SOLUTION INTRAVENOUS CONTINUOUS
Status: DISCONTINUED | OUTPATIENT
Start: 2022-02-18 | End: 2022-02-25

## 2022-02-18 RX ORDER — LOSARTAN POTASSIUM 50 MG/1
50 TABLET ORAL DAILY
Status: DISCONTINUED | OUTPATIENT
Start: 2022-02-18 | End: 2022-02-22

## 2022-02-18 RX ORDER — LORAZEPAM 2 MG/ML
1 INJECTION INTRAMUSCULAR ONCE
Status: COMPLETED | OUTPATIENT
Start: 2022-02-18 | End: 2022-02-18

## 2022-02-18 RX ADMIN — ALOGLIPTIN 12.5 MG: 12.5 TABLET, FILM COATED ORAL at 08:46

## 2022-02-18 RX ADMIN — METFORMIN HYDROCHLORIDE 1000 MG: 500 TABLET ORAL at 17:28

## 2022-02-18 RX ADMIN — DESMOPRESSIN ACETATE 40 MG: 0.2 TABLET ORAL at 08:46

## 2022-02-18 RX ADMIN — LOSARTAN POTASSIUM 50 MG: 50 TABLET, FILM COATED ORAL at 08:46

## 2022-02-18 RX ADMIN — MIDAZOLAM 2 MG: 1 INJECTION INTRAMUSCULAR; INTRAVENOUS at 23:53

## 2022-02-18 RX ADMIN — MIDAZOLAM HYDROCHLORIDE 2 MG: 2 INJECTION, SOLUTION INTRAMUSCULAR; INTRAVENOUS at 23:53

## 2022-02-18 RX ADMIN — SODIUM CHLORIDE, PRESERVATIVE FREE 10 ML: 5 INJECTION INTRAVENOUS at 08:48

## 2022-02-18 RX ADMIN — HYDROCHLOROTHIAZIDE 12.5 MG: 25 TABLET ORAL at 08:46

## 2022-02-18 RX ADMIN — LORAZEPAM 1 MG: 2 INJECTION INTRAMUSCULAR at 01:28

## 2022-02-18 RX ADMIN — METFORMIN HYDROCHLORIDE 1000 MG: 500 TABLET ORAL at 08:46

## 2022-02-18 ASSESSMENT — PAIN SCALES - GENERAL: PAINLEVEL_OUTOF10: 0

## 2022-02-18 NOTE — ED NOTES
Cousin who is present at bedside, updated me that the patient did experience a fall around 30 days ago and hit her head. Family says patient \"seemed fine after\".      Brandan Ugarte RN  02/17/22 [id number]

## 2022-02-18 NOTE — CARE COORDINATION
Case Management Initial Discharge [facility],             Met with: pt's legal guardian, Frank Jackson, to discuss discharge plans. Information verified: address, contacts, phone number, , insurance Yes  Insurance Provider: [employer]    Emergency Contact/Next of Kin name & number: Randy Ramsey legal guardian, [phone]. Who are involved in patient's support system? Randy Ramsey    PCP: Ann Garcia PA-C  Date of last visit:       Discharge Planning    Living Arrangements:    Randy Ramsey lives with pt. Home has 2 stories  4 stairs to climb to get into front door. Location of bedroom/bathroom in home Main level. Patient able to perform ADL's: Assisted at baseline    Current Services (outpatient & in home) [facility], [address] [place] [address] Adult Day Care  DME equipment: Linda Gupta, bedside commode  DME provider: Not known. Is patient receiving oral anticoagulation therapy? No    Potential Assistance Needed:    Rehab    Patient agreeable to home care: Yes  Freedom of choice provided:  yes    Prior SNF/Rehab Placement and Facility: [facility]  Agreeable to SNF/Rehab: Yes  [place] of choice provided: yes     Evaluation: no    Patient expects to be discharged to:   Potentially rehab    Transportation provider: Pt's guardian, Randy Ramsey  Transportation arrangements needed for discharge: Yes    Readmission Risk              Risk of Unplanned Readmission:  8             Does patient have a readmission risk score greater than 14?: No  If yes, follow-up appointment must be made within 7 days of discharge. Goals of Care: Increase comfort. Educated pt's guardian, Frank Jackson, on transitional options, provided freedom of choice and are agreeable with plan      Discharge Plan: Await therapy evaluations, SNF and ARU lists given.         Electronically signed by Jacoby Young RN on 22 at 12:57 PM EST

## 2022-02-18 NOTE — PROGRESS NOTES
Daily Progress Note  Neuro Critical Care    Patient Name: Kianna Polo  Patient : 1940  Room/Bed: 4998/9501-28  Code Status: Full  Allergies: No Known Allergies    CHIEF COMPLAINT:     AMS     INTERVAL HISTORY    Initial Presentation (Admitted 2022.):  The patient is a 80 y.o. 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 Last known well was yesterday at approximately 8 PM when patient was going to bed. Daughter states that she woke up today and she had \"a choking \"like episode and was exhibiting altered mental status. Patient does have a history of baseline and is normally not alert to place or time however daughter reports she is normally able to recognize family members and was unable to recognize her daughter today which made her concerned. She did call EMS, patient initially hypotensive via EMS however normotensive in the emergency department. Work-up was remarkable for acute on chronic right subdural hematoma measuring up to 15 mm in thickness with 4 mm of right to left shift. Patient is prescribed a daily aspirin however daughter states she does not regularly get this medication as daughter believes that she does not need it. No history of trauma recently however daughter does report that the patient fell onto her bottom approximately 1 month ago.        ICH score: 1    Hospital Course:   Did speak to daughter regarding [address] who wants to continue with CPR, at least one round but declines patient being intubated. She states that she have to think about it and is uncertain. At baseline, patient has dementia and neurosurgery wants a repeat imaging in the morning for possible intervention. No change in her neurological status. CT head has been stable.          CURRENT MEDICATIONS:  SCHEDULED MEDICATIONS:   atorvastatin  40 mg Oral Daily    sodium chloride flush  5-40 mL IntraVENous 2 times per day    losartan  50 mg Oral Daily    And    hydroCHLOROthiazide  12.5 mg Oral Daily    metFORMIN  1,000 mg Oral BID WC    And    alogliptin  12.5 mg Oral Daily     CONTINUOUS INFUSIONS:   sodium chloride       PRN MEDICATIONS:   sodium chloride flush, sodium chloride, ondansetron **OR** ondansetron, polyethylene glycol, acetaminophen    VITALS:  Temperature Range: Temp: 97.9 °F (36.6 °C) Temp  Av.7 °F (36.5 °C)  Min: 97.4 °F (36.3 °C)  Max: 97.9 °F (36.6 °C)  BP Range: Systolic (13NET), ZHR:195 , Min:106 , CJK:580     Diastolic (55TYG), QJX:12, Min:52, Max:103    Pulse Range: Pulse  Av.2  Min: 49  Max: 82  Respiration Range: Resp  Av.9  Min: 10  Max: 20  Current Pulse Ox: SpO2: 97 %  24HR Pulse Ox Range: SpO2  Av.2 %  Min: 91 %  Max: 100 %  Patient Vitals for the past 12 hrs:   BP Pulse Resp SpO2   22 1100 (!) 129/103 57 20 97 %   22 1000 (!) 108/58 54 13 100 %   22 0900 (!) 140/103 65 16 98 %   22 0814 (!) 143/86 54 13 97 %   22 0800 -- 54 13 100 %   22 0700 -- 70 16 94 %   22 0605 (!) 147/73 73 12 97 %   22 0600 -- -- -- 98 %   22 0500 (!) 144/71 (!) 49 13 95 %     Estimated body mass index is 28.39 kg/m² as calculated from the following:    Height as of 3/23/18: 5' 2\" (1.575 m).     Weight as of 18: 155 lb 3.2 oz (70.4 kg).  []<16 Severe malnutrition  []16-16.99 Moderate malnutrition  []17-18.49 Mild malnutrition  []18.5-24.9 Normal  []25-29.9 Overweight (not obese)  []30-34.9 Obese class 1 (Low Risk)  []35-39.9 Obese class 2 (Moderate Risk)  []?40 Obese class 3 (High Risk)    RECENT LABS:   Lab Results   Component Value Date    WBC 4.7 2022    HGB 13.7 2022    HCT 41.1 2022     2022    CHOL 137 10/19/2017    TRIG 56 10/19/2017    HDL 92 10/19/2017    ALT 12 2022    AST 21 2022    NA 139 02/18/2022    K 3.8 02/18/2022     02/18/2022    CREATININE 0.58 02/18/2022    BUN 7 (L) 02/18/2022    CO2 20 02/18/2022    TSH 3.49 03/14/2018    INR 1.2 02/17/2022    LABA1C 5.9 10/19/2017    LABMICR 8 10/10/2016     24 HOUR INTAKE/OUTPUT:No intake or output data in the 24 hours ending 02/18/22 1627    IMAGING:       Labs and Images reviewed with:  [] Dr. Bernardino Miller. Amee    [] Dr. Geoffery Gottron  [x] Dr. Britany Witt  [] There are no new interval images to review. PHYSICAL EXAM       CONSTITUTIONAL:  Well developed, well nourished, alert and oriented x 3, in no acute distress. GCS 15.  Speech. No dysarthria or aphasia. HEAD:  normocephalic, atraumatic    EYES:  PERRLA, EOMI. no gaze deviation. ENT:  moist mucous membranes   NECK:  supple, symmetric   LUNGS:  Equal air entry bilaterally   CARDIOVASCULAR:  normal s1 / s2, RRR, distal pulses intact   ABDOMEN:  Soft, no rigidity   NEUROLOGIC:  Mental Status:  Not oriented to date, Not oriented to person and Not oriented to place,awake             Cranial Nerves:    II: Visual acuity:  normal  II: Visual fields:  normal  III: Pupils:  equal, round, reactive to light  III,IV,VI: Extra Ocular Movements: intact  V: Facial sensation:  intact  VII: Facial strength: Minimal droop left face  VIII: Hearing:  intact  IX: Palate:  intact  XI: Shoulder shrug:  intact  XII: Tongue movement:  normal     Motor Exam:    Drift: Slight drift present left upper extremity, however able to sustain antigravity  Tone:  normal     MOTOR:  RUE: 5/5  LUE: 3/5  RLE: 5/5  LLE: 4/5     Sensory:    Touch:    Right Upper Extremity:  normal  Left Upper Extremity:  normal  Right Lower Extremity:  normal  Left Lower Extremity:  normal     Clonus:  absent     Coordination/Dysmetria:  Heel to Shin:  Normal  Finger to Nose:   Normal     TOTAL:     DRAINS:  [] There are no drains for Neuro Critical Care to monitor at this time.      ASSESSMENT AND PLAN:          ASSESSMENT:      This is a 80 y.o. 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 She does have a history of dementia however daughter was concerned as patient was acting different from her baseline today. CT in the emergency department revealed subdural hematoma with shift. Neurosurgery and neuro critical care consulted out of the emergency department.     Did speak with daughter regarding CODE STATUS and family would prefer at least 1 round of CPR. Unlikely to proceed with intubation but will think about it. Repeat CT head did not show any changes, patient will be n.p.o. overnight with a repeat CT in the morning for possible intervention. According to daughter, patient appears mostly at baseline. PLAN/MEDICAL DECISION MAKING:     NEUROLOGIC:  - Imaging CT head demonstrates acute on chronic right subdural hematoma with 4 mm and shift  -Repeat CT head stable  -N.p.o. after midnight  -Planned CT head in a.m. for possible neurosurgical intervention.  -Neurosurgery on board,  -SBP goal less than 160     CARDIOVASCULAR:  - Goal SBP less than 160  -Continue on telemetry as directed     PULMONARY:  -Monitor for any change in the patient's respiratory status     RENAL/FLUID/ELECTROLYTE:  -Follow-up BUNs/creatinine  -Follow-up urine output       GI/NUTRITION:  NUTRITION:  N.p.o. after midnight  - Bowel regimen: Zofran, GlycoLax  - GI prophylaxis: None       ID:  -Monitor for hypo and hyperthermia.   -Tylenol as needed if T-max greater than 101.  -Panculture if febrile.       HEME:   -Follow-up CBC in a.m.  -Continue to monitor platelet count     ENDOCRINE:  - Continue to monitor blood glucose, goal <180       OTHER:  - PT/OT/ST   - Code Status: Prior     PROPHYLAXIS:  Stress ulcer: Not indicated     DVT PROPHYLAXIS:  - SCD sleeves - Thigh High     We will continue to follow along. For any changes in exam or patient status please contact Neuro Critical Care.       Margaret Ureña MD  Neuro Critical Care  Pager [phone]  2/18/2022     4:27 PM

## 2022-02-18 NOTE — PLAN OF CARE
Problem: Falls - Risk of:  Goal: Will remain free from falls  Description: Will remain free from falls  2/18/2022 1705 by Joni Cervantes RN  Outcome: Ongoing  2/18/2022 0415 by Prema Sal RN  Outcome: Ongoing  Goal: Absence of physical injury  Description: Absence of physical injury  2/18/2022 1705 by Joni Cervantes RN  Outcome: Ongoing  2/18/2022 0415 by Prema Sal RN  Outcome: Ongoing     Problem: Infection:  Goal: Will remain free from infection  Description: Will remain free from infection  2/18/2022 1705 by Joni Cervantes RN  Outcome: Ongoing  2/18/2022 0415 by Prema Sal RN  Outcome: Ongoing     Problem: Safety:  Goal: Free from accidental physical injury  Description: Free from accidental physical injury  2/18/2022 1705 by Joni Cervantes RN  Outcome: Ongoing  2/18/2022 0415 by Prema Sal RN  Outcome: Ongoing  Goal: Free from intentional harm  Description: Free from intentional harm  2/18/2022 1705 by Joni Cervantes RN  Outcome: Ongoing  2/18/2022 0415 by Prema Sal RN  Outcome: Ongoing     Problem: Daily Care:  Goal: Daily care needs are met  Description: Daily care needs are met  2/18/2022 1705 by Joni Cervantes RN  Outcome: Ongoing  2/18/2022 0415 by Prema Sal RN  Outcome: Ongoing     Problem: Pain:  Goal: Patient's pain/discomfort is manageable  Description: Patient's pain/discomfort is manageable  2/18/2022 1705 by Joni Cervantes RN  Outcome: Ongoing  2/18/2022 0415 by Prema Sal RN  Outcome: Ongoing     Problem: Skin Integrity:  Goal: Skin integrity will stabilize  Description: Skin integrity will stabilize  2/18/2022 1705 by Joni Cervantes RN  Outcome: Ongoing  2/18/2022 0415 by Prema Sal RN  Outcome: Ongoing     Problem: Discharge Planning:  Goal: Patients continuum of care needs are met  Description: Patients continuum of care needs are met  2/18/2022 1705 by Joni Cervantes RN  Outcome: Ongoing  2/18/2022 0415 by Prema Sal RN  Outcome: Ongoing Problem: HEMODYNAMIC STATUS  Goal: Patient has stable vital signs and fluid balance  2/18/2022 1705 by Courtney Hogan RN  Outcome: Ongoing  2/18/2022 0415 by Treva Braswell RN  Outcome: Ongoing     Problem: ACTIVITY INTOLERANCE/IMPAIRED MOBILITY  Goal: Mobility/activity is maintained at optimum level for patient  2/18/2022 1705 by Courtney Hoagn RN  Outcome: Ongoing  2/18/2022 0415 by Treva Braswell RN  Outcome: Ongoing     Problem: COMMUNICATION IMPAIRMENT  Goal: Ability to express needs and understand communication  2/18/2022 1705 by Courtney Hogan RN  Outcome: Ongoing  2/18/2022 0415 by Treva Braswell RN  Outcome: Ongoing

## 2022-02-18 NOTE — ED PROVIDER NOTES
[facility]     Emergency Department     Faculty Attestation        I performed a history and physical examination of the patient and discussed management with the resident. I reviewed the residents note and agree with the documented findings and plan of care. Any areas of disagreement are noted on the chart. I was personally present for the key portions of any procedures. I have documented in the chart those procedures where I was not present during the key portions. I have reviewed the emergency nurses triage note. I agree with the chief complaint, past medical history, past surgical history, allergies, medications, social and family history as documented unless otherwise noted below. For mid-level providers such as nurse practitioners as well as physicians assistants:    I have personally seen and evaluated the patient. I find the patient's history and physical exam are consistent with NP/PA documentation. I agree with the care provided, treatment rendered, disposition, & follow-up plan. Additional findings are as noted. Vital Signs: /67   Pulse 82   Temp 97.4 °F (36.3 °C)   Resp 19   SpO2 95%   PCP:  Denzel Holter, PA-C    Pertinent Comments:     Patient presents via EMS with concern for altered mentation she is been weak and tired may be slurring her words. She was originally hypotensive by EMS but is normotensive here. She is awake and intermittently confused but has no focal deficits. No clinical signs suggest trauma. Otherwise afebrile.       Critical Care  None          John Maurice MD    Attending Emergency Medicine Physician            Iliana Pelletier MD  02/17/22 [id number]

## 2022-02-18 NOTE — ED NOTES
The following labs labeled with pt sticker and tubed to lab:     [] Blue     [] Lavender   [] on ice  [] Green/yellow  [x] Green/black [x] on ice  [] Yellow  [] Red  [] Pink      [] COVID-19 swab    [] Rapid  [] PCR  [] Flu swab  [] Peds Viral Panel     [] Urine Sample  [] Pelvic Cultures  [x] Blood Cultures  x1           Fernando Cervantes RN  02/18/22 3611

## 2022-02-18 NOTE — ED NOTES
Attempted straight catheter @ 7208, patient unable to cooperate with opening legs. Per Dr. Mor Cherry, one attempt was acceptable.       Federica Sunshine RN  02/17/22 0054

## 2022-02-18 NOTE — ED PROVIDER NOTES
Not on file   Social History Narrative    Not on file     Social Determinants of Health     Financial Resource Strain:     Difficulty of Paying Living Expenses: Not on file   Food Insecurity:     Worried About Running Out of Food in the Last Year: Not on file    Anuradha of Food in the Last Year: Not on file   Transportation Needs:     Lack of Transportation (Medical): Not on file    Lack of Transportation (Non-Medical): Not on file   Physical Activity:     Days of Exercise per Week: Not on file    Minutes of Exercise per Session: Not on file   Stress:     Feeling of Stress : Not on file   Social Connections:     Frequency of Communication with Friends and Family: Not on file    Frequency of Social Gatherings with Friends and Family: Not on file    Attends [employer] Services: Not on file    Active Member of [address] [place] [address] [employer] or Organizations: Not on file    Attends Club or Organization Meetings: Not on file    Marital Status: Not on file   Intimate Partner Violence:     Fear of Current or Ex-Partner: Not on file    Emotionally Abused: Not on file    Physically Abused: Not on file    Sexually Abused: Not on file   Housing Stability:     Unable to Pay for Housing in the Last Year: Not on file    Number of [place] in the Last Year: Not on file    Unstable Housing in the Last Year: Not on file       Family History   Problem Relation Age of Onset    High Blood Pressure Mother        Allergies:  Patient has no known allergies. Home Medications:  Prior to Admission medications    Medication Sig Start Date End Date Taking?  Authorizing Provider   losartan-hydrochlorothiazide [facility]) 50-12.5 MG per tablet Take 1 tablet by mouth daily 1/8/19   Ariadne Hebert PA-C   aspirin (ASPIR-LOW) 81 MG EC tablet TAKE ONE TABLET BY MOUTH DAILY 4/11/18   Ariadne Hebert PA-C   atorvastatin (LIPITOR) 40 MG tablet TAKE ONE TABLET BY MOUTH DAILY 4/11/18   Ariadne Hebert PA-C   sitaGLIPtan-metformin [facility])  MG per tablet TAKE ONE TABLET BY MOUTH DAILY WITH SUPPER 4/11/18   Rachel Seo PA-C       REVIEW OF SYSTEMS    (2-9 systems for level 4, 10 or more for level 5)      Review of Systems   Constitutional: Negative for fever. HENT: Negative for congestion. Eyes: Negative for photophobia. Respiratory: Negative for shortness of breath. Cardiovascular: Negative for chest pain. Gastrointestinal: Negative for abdominal pain and vomiting. Endocrine: Negative for polyuria. Genitourinary: Negative for dysuria. Musculoskeletal: Negative for arthralgias. Skin: Negative for color change. Allergic/Immunologic: Negative for immunocompromised state. Neurological: Positive for confusion  Hematological: Does not bruise/bleed easily. Psychiatric/Behavioral: Negative for agitation. PHYSICAL EXAM   (up to 7 for level 4, 8 or more for level 5)      INITIAL VITALS:   /81   Pulse 67   Temp 97.4 °F (36.3 °C)   Resp 20   SpO2 94%     Physical Exam  Constitutional:       General: Not in acute distress. Appearance: Normal appearance. Normal weight. Not toxic-appearing. HENT:      Head: Normocephalic and atraumatic. Nose: Nose normal.      Mouth/Throat: Mucous membranes are moist.  Uvula midline no oropharyngeal edema. Pharynx: Oropharynx is clear. Eyes:      Extraocular Movements: Extraocular movements intact. Conjunctiva/sclera: Conjunctivae normal.      Pupils: Pupils are equal, round, and reactive to light. Neck:      Musculoskeletal: Normal range of motion and neck supple. No neck rigidity. Cardiovascular:      Rate and Rhythm: Normal rate and regular rhythm. Pulses: Normal pulses. Heart sounds: Normal heart sounds. No murmur. Pulmonary:      Effort: Pulmonary effort is normal.      Breath sounds: Normal breath sounds. No wheezing. Abdominal:      General: Abdomen is flat. Bowel sounds are normal.      Tenderness:  There is no abdominal tenderness. Musculoskeletal:     Normal range of motion. General: No swelling or tenderness. No LE edema    Skin:     General: Skin is warm. Capillary Refill: Capillary refill takes less than 2 seconds. Coloration: Skin is not jaundiced. Neurological:      General: Left-sided facial droop left-sided weakness left-sided lower extremity weakness chronic. DIFFERENTIAL  DIAGNOSIS     PLAN (LABS / IMAGING / EKG):  Orders Placed This Encounter   Procedures    COVID-19, Rapid    RAPID INFLUENZA A/B ANTIGENS    Culture, Urine    Culture, Blood 1    Culture, Blood 1    CT Head WO Contrast    XR CHEST PORTABLE    CBC with Auto Differential    Comprehensive Metabolic Panel w/ Reflex to MG    Troponin    Protime-INR    APTT    Urinalysis with Microscopic    Lactate, Sepsis    ELECTROLYTES PLUS    Hemoglobin and hematocrit, blood    CALCIUM, IONIC (POC)    Straight cath    Inpatient consult to Neurocritical care    Inpatient consult to Neurosurgery    Venous Blood Gas, POC    Creatinine W/GFR Point of Care    POCT urea (BUN)    Lactic Acid, POC    POCT Glucose    EKG 12 Lead    Insert peripheral IV    ADMIT TO INPATIENT       MEDICATIONS ORDERED:  Orders Placed This Encounter   Medications    0.9 % sodium chloride bolus    levETIRAcetam (KEPPRA) 1500 mg/100 mL IVPB    LORazepam (ATIVAN) injection 1 mg           DIAGNOSTIC RESULTS / EMERGENCY DEPARTMENT COURSE / MDM     LABS:  Results for orders placed or performed during the hospital encounter of 02/17/22   COVID-19, Rapid    Specimen: Nasopharyngeal Swab   Result Value Ref Range    Specimen Description . NASOPHARYNGEAL SWAB     SARS-CoV-2, Rapid Not Detected Not Detected   RAPID INFLUENZA A/B ANTIGENS    Specimen: Nasopharyngeal Swab   Result Value Ref Range    Specimen Description . NASOPHARYNGEAL SWAB     Special Requests NOT REPORTED     Direct Exam       NEGATIVE for Influenza A + B antigens. PCR testing to confirm this result is available upon request.  Specimen will be saved in the laboratory for 7 days. Please call [phone] if PCR testing is indicated. Culture, Blood 1    Specimen: Blood   Result Value Ref Range    Specimen Description . BLOOD     Special Requests LEFT AC 10ML     Culture NO GROWTH <24 HRS    Culture, Blood 1    Specimen: Blood   Result Value Ref Range    Specimen Description . BLOOD     Special Requests RIGHT UPPER ARM 20ML     Culture NO GROWTH <24 HRS    ELECTROLYTES PLUS   Result Value Ref Range    POC Sodium 142 138 - 146 mmol/L    POC Potassium 3.3 (L) 3.5 - 4.5 mmol/L    POC Chloride 107 98 - 107 mmol/L    POC TCO2 25 22 - 30 mmol/L    Anion Gap 11 7 - 16 mmol/L   CBC with Auto Differential   Result Value Ref Range    WBC 6.9 3.5 - 11.3 k/uL    RBC 4.29 3.95 - 5.11 m/uL    Hemoglobin 13.5 11.9 - 15.1 g/dL    Hematocrit 40.4 36.3 - 47.1 %    MCV 94.2 82.6 - 102.9 fL    MCH 31.5 25.2 - 33.5 pg    MCHC 33.4 28.4 - 34.8 g/dL    RDW 15.9 (H) 11.8 - 14.4 %    Platelets 810 078 - 402 k/uL    MPV 9.9 8.1 - 13.5 fL    NRBC Automated 0.0 0.0 per 100 WBC    Differential Type NOT REPORTED     Seg Neutrophils 69 (H) 36 - 65 %    Lymphocytes 20 (L) 24 - 43 %    Monocytes 8 3 - 12 %    Eosinophils % 3 1 - 4 %    Basophils 0 0 - 2 %    Immature Granulocytes 0 0 %    Segs Absolute 4.67 1.50 - 8.10 k/uL    Absolute Lymph # 1.39 1.10 - 3.70 k/uL    Absolute Mono # 0.53 0.10 - 1.20 k/uL    Absolute Eos # 0.22 0.00 - 0.44 k/uL    Basophils Absolute <0.03 0.00 - 0.20 k/uL    Absolute Immature Granulocyte 0.03 0.00 - 0.30 k/uL    WBC Morphology NOT REPORTED     RBC Morphology ANISOCYTOSIS PRESENT     Platelet Estimate NOT REPORTED    Comprehensive Metabolic Panel w/ Reflex to MG   Result Value Ref Range    Glucose 128 (H) 70 - 99 mg/dL    BUN 9 8 - 23 mg/dL    CREATININE 0.80 0.50 - 0.90 mg/dL    Bun/Cre Ratio NOT REPORTED 9 - 20    Calcium 9.9 8.6 - 10.4 mg/dL    Sodium 139 135 - 144 mmol/L    Potassium 4.0 3.7 - 5.3 mmol/L    Chloride 101 98 - 107 mmol/L    CO2 21 20 - 31 mmol/L    Anion Gap 17 9 - 17 mmol/L    Alkaline Phosphatase 72 35 - 104 U/L    ALT 12 5 - 33 U/L    AST 21 <32 U/L    Total Bilirubin 0.58 0.3 - 1.2 mg/dL    Total Protein 6.8 6.4 - 8.3 g/dL    Albumin 3.8 3.5 - 5.2 g/dL    Albumin/Globulin Ratio 1.3 1.0 - 2.5    GFR Non-African American >60 >60 mL/min    GFR African American >60 >60 mL/min    GFR Comment          GFR Staging NOT REPORTED    Troponin   Result Value Ref Range    Troponin, High Sensitivity 37 (H) 0 - 14 ng/L    Troponin T NOT REPORTED <0.03 ng/mL    Troponin Interp NOT REPORTED    Protime-INR   Result Value Ref Range    Protime 12.4 (H) 9.1 - 12.3 sec    INR 1.2    APTT   Result Value Ref Range    PTT 24.4 20.5 - 30.5 sec   Lactate, Sepsis   Result Value Ref Range    Lactic Acid, Sepsis NOT REPORTED 0.5 - 1.9 mmol/L    Lactic Acid, Sepsis, Whole Blood 2.2 (H) 0.5 - 1.9 mmol/L   Lactate, Sepsis   Result Value Ref Range    Lactic Acid, Sepsis NOT REPORTED 0.5 - 1.9 mmol/L    Lactic Acid, Sepsis, Whole Blood 1.1 0.5 - 1.9 mmol/L   Hemoglobin and hematocrit, blood   Result Value Ref Range    POC Hemoglobin 12.9 12.0 - 16.0 g/dL    POC Hematocrit 38 36 - 46 %   CALCIUM, IONIC (POC)   Result Value Ref Range    POC Ionized Calcium 1.15 1.15 - 1.33 mmol/L   Venous Blood Gas, POC   Result Value Ref Range    pH, Devyn 7.368 7.320 - 7.430    pCO2, Devyn 42.8 41.0 - 51.0 mm Hg    pO2, Devyn 28.5 (L) 30.0 - 50.0 mm Hg    HCO3, Venous 24.6 22.0 - 29.0 mmol/L    Total CO2, Venous NOT REPORTED 23.0 - 30.0 mmol/L    Negative Base Excess, Devyn 1 0.0 - 2.0    Positive Base Excess, Devyn NOT REPORTED 0.0 - 3.0    O2 Sat, Devyn 52 (L) 60.0 - 85.0 %    O2 Device/Flow/% NOT REPORTED     Srinivas Test NOT REPORTED     Sample Site NOT REPORTED     Mode NOT REPORTED     FIO2 NOT REPORTED     Pt Temp NOT REPORTED     POC pH Temp NOT REPORTED     POC pCO2 Temp NOT REPORTED mm Hg    POC pO2 Temp NOT REPORTED mm Hg   Creatinine W/GFR Point of Care   Result Value Ref Range    POC Creatinine 0.71 0.51 - 1.19 mg/dL    GFR Comment >60 >60 mL/min    GFR Non-African American >60 >60 mL/min    GFR Comment         POCT urea (BUN)   Result Value Ref Range    POC BUN 8 8 - 26 mg/dL   Lactic Acid, POC   Result Value Ref Range    POC Lactic Acid 1.85 (H) 0.56 - 1.39 mmol/L   POCT Glucose   Result Value Ref Range    POC Glucose 129 (H) 74 - 100 mg/dL         RADIOLOGY:  CT Head WO Contrast    Addendum Date: 2/17/2022    ADDENDUM: Case discussed with Dr. Aline sykes at 10:24 p.m. Result Date: 2/17/2022  EXAMINATION: CT OF THE HEAD WITHOUT CONTRAST  2/17/2022 9:44 pm TECHNIQUE: CT of the head was performed without the administration of intravenous contrast. Dose modulation, iterative reconstruction, and/or weight based adjustment of the mA/kV was utilized to reduce the radiation dose to as low as reasonably achievable. COMPARISON: MR brain March 15, 2018 and CT head March 14, 2018 HISTORY: ORDERING SYSTEM PROVIDED HISTORY: ams TECHNOLOGIST PROVIDED HISTORY: ams Decision Support Exception - unselect if not a suspected or confirmed emergency medical condition->Emergency Medical Condition (MA) FINDINGS: BRAIN/VENTRICLES: Holo hemispheric extra-axial hemorrhage on the right measuring up to 15 mm in thickness with a dense hematocrit effect noted posteriorly. There is 4 mm right to left midline shift at the septum pellucidum. Ventricles cisterns and sulci are prominent. 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 Moderate nonspecific white matter disease. ORBITS: The visualized portion of the orbits demonstrate no acute abnormality. SINUSES: The visualized paranasal sinuses and mastoid air cells demonstrate no acute abnormality. SOFT TISSUES/SKULL: No acute abnormality of the visualized skull or soft tissues.      Acute on chronic right subdural hematoma with midline shift as above The findings were sent to the Radiology Results [id number] at 10:18 pm on 2/17/2022to be communicated to a licensed caregiver. XR CHEST PORTABLE    Result Date: 2/17/2022  EXAMINATION: ONE XRAY VIEW OF THE CHEST 2/17/2022 11:15 pm COMPARISON: 03/14/2018 HISTORY: ORDERING SYSTEM PROVIDED HISTORY: concern for infection TECHNOLOGIST PROVIDED HISTORY: concern for infection Reason for Exam: supine,found down,loc FINDINGS: Chest radiograph: There is mild to moderate cardiomegaly and interval prominence of mediastinal and hilar contour, most likely related to congestion. There is increased interstitial markings throughout both lungs likely suggestive of pulmonary edema. Small  left pleural effusion. No pneumothorax. .  No focal consolidation. The overlying soft tissue and osseous structures do not demonstrate acute abnormality. Mild to moderate cardiomegaly and findings suggestive of pulmonary edema with small left pleural effusion. EKG  None    All EKG's are interpreted by the Emergency Department Physician who either signs or Co-signs this chart in the absence of a cardiologist.    EMERGENCY DEPARTMENT COURSE:  Patient soft-spoken, alert to person. Not alert to place. Recognizes and knows daughter in the room. No slurred speech. Patient has chronic left-sided deficits. Patient appears slightly malnourished. Given transient loss of awareness and reported altered mental status concern for urinary tract infection, pneumonia, electrolyte derangement, head injury. Will do infectious work-up and CT head. Acute on chronic subdural hematoma. No external signs of trauma no reported history of falls. Will consult neurosurgery, neuro critical care. Will give 1.5 g IV Keppra. Neuro critical care to admit. PROCEDURES:  None    CONSULTS:  IP CONSULT TO NEUROCRITICAL CARE  IP CONSULT TO NEUROSURGERY    CRITICAL CARE:  None    FINAL IMPRESSION      1.  Subdural hematoma ([address]          DISPOSITION / PLAN DISPOSITION Admitted 02/18/2022 01:24:10 AM      PATIENT REFERRED TO:  No follow-up provider specified.     DISCHARGE MEDICATIONS:  New Prescriptions    No medications on file       Memo Quesada MD  Emergency Medicine Resident    (Please note that portions of thisnote were completed with a voice recognition program.  Efforts were made to edit the dictations but occasionally words are mis-transcribed.)       Memo Quesada MD  Resident  02/18/22 [id number]

## 2022-02-18 NOTE — H&P
Neuro ICU History & Physical    Patient Name: Ginger Ramirez  Patient : 1940  Room/Bed:   Code Status: Prior  Allergies: No Known Allergies    CHIEF COMPLAINT     AMS    HPI    History Obtained From: EHR, Daughter    The patient is a 80 y.o. 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 Last known well was yesterday at approximately 8 PM when patient was going to bed. Daughter states that she woke up today and she had \"a choking \"like episode and was exhibiting altered mental status. Patient does have a history of baseline and is normally not alert to place or time however daughter reports she is normally able to recognize family members and was unable to recognize her daughter today which made her concerned. She did call EMS, patient initially hypotensive via EMS however normotensive in the emergency department. Work-up was remarkable for acute on chronic right subdural hematoma measuring up to 15 mm in thickness with 4 mm of right to left shift. Patient is prescribed a daily aspirin however daughter states she does not regularly get this medication as daughter believes that she does not need it. No history of trauma recently however daughter does report that the patient fell onto her bottom approximately 1 month ago. ICH score: 1    Admitted to ICU From: Emergency department  Reason for ICU Admission: Subdural hematoma       PATIENT HISTORY   Past Medical History:        Diagnosis Date    Gout     Hyperlipidemia     Hypertension     Type II or unspecified type diabetes mellitus without mention of complication, not stated as uncontrolled        Past Surgical History:    No past surgical history on file.     Social History:   Social History     Socioeconomic History    Marital status: Single     Spouse name: Not on file    Number of children: Not on file    Years of education: Not on file    Highest education level: Not on file   Occupational History    Not on file   Tobacco Use    Smoking status: Former Smoker     Years: 20.00     Types: Cigarettes     Quit date: 1980     Years since quittin.8    Smokeless tobacco: Never Used   Substance and Sexual Activity    Alcohol use: Not on file    Drug use: Not on file    Sexual activity: Not on file   Other Topics Concern    Not on file   Social History Narrative    Not on file     Social Determinants of Health     Financial Resource Strain:     Difficulty of Paying Living Expenses: Not on file   Food Insecurity:     Worried About [address] in the Last Year: Not on file    [address] in the Last Year: Not on file   Transportation Needs:     Lack of Transportation (Medical): Not on file    Lack of Transportation (Non-Medical):  Not on file   Physical Activity:     Days of Exercise per Week: Not on file    Minutes of Exercise per Session: Not on file   Stress:     Feeling of Stress : Not on file   Social Connections:     Frequency of Communication with Friends and Family: Not on file    Frequency of Social Gatherings with Friends and Family: Not on file    Attends Buddhism Services: Not on file    Active Member of [address] [place] [address] or Organizations: Not on file    Attends Club or Organization Meetings: Not on file    Marital Status: Not on file   Intimate Partner Violence:     Fear of Current or Ex-Partner: Not on file    Emotionally Abused: Not on file    Physically Abused: Not on file    Sexually Abused: Not on file   Housing Stability:     Unable to Pay for Housing in the Last Year: Not on file    Number of [place] in the Last Year: Not on file    Unstable Housing in the Last Year: Not on file       Family History:       Problem Relation Age of Onset    High Blood Pressure Mother        Allergies:    Patient has no known allergies. Medications Prior to Admission:    Not in a hospital admission. Current Medications:  No current facility-administered medications for this encounter. REVIEW OF SYSTEMS     CONSTITUTIONAL: negative for fatigue, per daughter positive for confusion   EYES: negative for double vision, blurry vision, and photophobia    HEENT: negative for tinnitus and sore throat   RESPIRATORY: negative for cough, shortness of breath   CARDIOVASCULAR: negative for chest pain, palpitations, or syncope   GASTROINTESTINAL: negative for abdominal pain, nausea, vomiting   GENITOURINARY: negative for incontinence or retention    MUSCULOSKELETAL: negative for neck or back pain, negative for extremity pain   NEUROLOGICAL: Negative for seizures, headaches, weakness, numbness, confusion, aphasia, dysarthria    PSYCHIATRIC: negative for agitation, hallucination, SI/HI   SKIN Negative for spontaneous contusions, rashes, or lesions      PHYSICAL EXAM:     /67   Pulse 82   Temp 97.4 °F (36.3 °C)   Resp 19   SpO2 95%     PHYSICAL EXAM:  CONSTITUTIONAL:  Well developed, well nourished, alert and oriented x 3, in no acute distress. GCS 14. Nontoxic. No dysarthria. No aphasia. HEAD:  normocephalic, atraumatic    EYES:  PERRLA, EOMI.  Visual acuity and peripheral vision intact b/l   ENT:  moist mucous membranes   NECK:  supple, symmetric   LUNGS:  Equal air entry bilaterally   CARDIOVASCULAR:  normal s1 / s2, RRR, distal pulses intact   ABDOMEN:  Soft, no rigidity   NEUROLOGIC:  Mental Status:  Not oriented to date, Not oriented to person and Not oriented to place,awake             Cranial Nerves:    II: Visual acuity:  normal  II: Visual fields:  normal  III: Pupils:  equal, round, reactive to light  III,IV,VI: Extra Ocular Movements: intact  V: Facial sensation:  intact  VII: Facial strength: Minimal droop left face  VIII: Hearing:  intact  IX: Palate:  intact  XI: Shoulder shrug:  intact  XII: Tongue movement: normal    Motor Exam:    Drift: Slight drift present left upper extremity, however able to sustain antigravity  Tone:  normal    MOTOR:  RUE: 5/5  LUE: 3/5  RLE: 5/5  LLE: 4/5    Sensory:    Touch:    Right Upper Extremity:  normal  Left Upper Extremity:  normal  Right Lower Extremity:  normal  Left Lower Extremity:  normal    Clonus:  absent    Coordination/Dysmetria:  Heel to Shin:  Normal  Finger to Nose:   Normal       NIH Stroke Scale Total (if not done complete detailed one below):    1a.  Level of consciousness:  0 - alert; keenly responsive  1b. Level of consciousness questions:  2 - answers neither question correctly  1c. Level of consciousness questions:  0 - performs both tasks correctly  2. Best Gaze:  0 - normal  3. Visual:  0 - no visual loss  4. Facial Palsy:  1 - minor paralysis (flattened nasolabial fold, asymmetric on smiling)  5a. Motor left arm:  1 - drift, limb holds 90 (or 45) degrees but drifts down before full 10 seconds: does not hit bed  5b. Motor right arm:  0 - no drift, limb holds 90 (or 45) degrees for full 10 seconds  6a. Motor left le - drift; leg falls by the end of the 5 second period but does not hit bed  6b. Motor right le - no drift; leg holds 30 degree position for full 5 seconds  7. Limb Ataxia:  0 - absent  8. Sensory:  0 - normal; no sensory loss  9. Best Language:  0 - no aphasia, normal  10. Dysarthria:  0 - normal  11.   Extinction and Inattention:  0 - no abnormality    LABS AND IMAGING:     RECENT LABS:  CBC with Differential:    Lab Results   Component Value Date    WBC 6.9 2022    RBC 4.29 2022    HGB 13.5 2022    HCT 40.4 2022     2022    MCV 94.2 2022    MCH 31.5 2022    MCHC 33.4 2022    RDW 15.9 2022    LYMPHOPCT 20 2022    MONOPCT 8 2022    BASOPCT 0 2022    MONOSABS 0.53 2022    LYMPHSABS 1.39 2022    EOSABS 0.22 2022    BASOSABS <0.03 02/17/2022    DIFFTYPE NOT REPORTED 02/17/2022     BMP:    Lab Results   Component Value Date     02/17/2022    K 4.0 02/17/2022     02/17/2022    CO2 21 02/17/2022    BUN 9 02/17/2022    LABALBU 3.8 02/17/2022    CREATININE 0.80 02/17/2022    CREATININE 0.71 02/17/2022    CALCIUM 9.9 02/17/2022    GFRAA >60 02/17/2022    LABGLOM >60 02/17/2022    GLUCOSE 128 02/17/2022       RADIOLOGY:  CT Head WO Contrast   Final Result   Addendum 1 of 1   ADDENDUM:   Case discussed with Dr. Roque sykes at 10:24 p.m. Final   Acute on chronic right subdural hematoma with midline shift as above      The findings were sent to the Radiology Results [address] at 10:18   pm on 2/17/2022to be communicated to a licensed caregiver. XR CHEST PORTABLE    (Results Pending)             Labs and Images reviewed with:    [] Parish Alejandra MD    [] Deepika Nascimento MD  [x] Masha Mcallister MD  --[] there are no new interval images to review. ASSESSMENT AND PLAN:         ASSESSMENT:     This is a 80 y.o. 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 She does have a history of dementia however daughter was concerned as patient was acting different from her baseline today. CT in the emergency department revealed subdural hematoma with shift. Neurosurgery and neuro critical care consulted out of the emergency department. Patient care will be discussed with attending, will reevaluate patient along with attending.      PLAN/MEDICAL DECISION MAKING:    NEUROLOGIC:  - Imaging CT head demonstrates acute on chronic right subdural hematoma with 4 mm and shift  - AEDs not indicated  - Goal SBP less than 160    CARDIOVASCULAR:  - Goal SBP less than 160    PULMONARY:  Currently on room air saturating well    RENAL/FLUID/ELECTROLYTE:  - BUN 9/ Creatinine 0.80  - I/O: No intake/output data recorded.   - IVF: none    GI/NUTRITION:  NUTRITION:  No diet orders on file  - Bowel regimen: Zofran, GlycoLax  - GI prophylaxis: None    ID:  Monitor for fevers  Lab Results   Component Value Date    WBC 6.9 02/17/2022     - Antimicrobials:  not indicated     HEME:   Recent Labs     02/17/22  2158   HGB 13.5     - Platelets 079    ENDOCRINE:  - Continue to monitor blood glucose, goal <180  - glucose controlled - most recent BGL is   Recent Labs     02/17/22  2158   GLUCOSE 128*       OTHER:  - PT/OT/ST   - Code Status: Prior    PROPHYLAXIS:  Stress ulcer: Not indicated    DVT PROPHYLAXIS:  - SCD sleeves - Thigh High         Laverna Meckel, DO  Neuro Critical Care Service   Pager [phone]  2/17/2022     11:31 PM

## 2022-02-18 NOTE — CONSULTS
[address]    Department of Neurosurgery  Resident Consult Note      Reason for Consult: Subdural hematoma  Requesting Physician: Dr. Miguel A Ojeda:   []Dr. Wisam Martin  []Dr. Veronica Carrasquillo  []Dr. Chito Galdamez  []Dr. Momo Ortiz  []Dr. Jeff Jean  []Dr. Jak Villanueva    History Obtained From: EHR, family member    CHIEF COMPLAINT:         Altered mental status    HISTORY OF PRESENT ILLNESS:        80 y.o. female with past medical history of hypertension, diabetes, hyperlipidemia, dementia, prior CVA in August 2021 with residual left-sided deficits who presented via EMS with her daughter due to complaints of altered mental status. Last known well was yesterday at approximately 8 PM when patient was going to bed. Daughter states that she woke up today and she had \"a choking \"like episode and was exhibiting altered mental status. Patient does have a history of baseline and is normally not alert to place or time however daughter reports she is normally able to recognize family members and was unable to recognize her daughter today which made her concerned. She did call EMS, patient initially hypotensive via EMS however normotensive in the emergency department. Work-up was remarkable for acute on chronic right subdural hematoma measuring up to 15 mm in thickness with 4 mm of right to left shift. Patient is prescribed a daily aspirin however daughter states she does not regularly get this medication as daughter believes that she does not need it. No history of trauma recently however daughter does report that the patient fell onto her bottom approximately 1 month ago. PAST MEDICAL HISTORY :       Past Medical History:        Diagnosis Date    Gout     Hyperlipidemia     Hypertension     Type II or unspecified type diabetes mellitus without mention of complication, not stated as uncontrolled        Past Surgical History:    No past surgical history on file.     Social History:   Social History Socioeconomic History    Marital status: Single     Spouse name: Not on file    Number of children: Not on file    Years of education: Not on file    Highest education level: Not on file   Occupational History    Not on file   Tobacco Use    Smoking status: Former Smoker     Years: 20.00     Types: Cigarettes     Quit date: 1980     Years since quittin.8    Smokeless tobacco: Never Used   Substance and Sexual Activity    Alcohol use: Not on file    Drug use: Not on file    Sexual activity: Not on file   Other Topics Concern    Not on file   Social History Narrative    Not on file     Social Determinants of Health     Financial Resource Strain:     Difficulty of Paying Living Expenses: Not on file   Food Insecurity:     Worried About [address] in the Last Year: Not on file    [address] [employer] in the Last Year: Not on file   Transportation Needs:     Lack of Transportation (Medical): Not on file    Lack of Transportation (Non-Medical):  Not on file   Physical Activity:     Days of Exercise per Week: Not on file    Minutes of Exercise per Session: Not on file   Stress:     Feeling of Stress : Not on file   Social Connections:     Frequency of Communication with Friends and Family: Not on file    Frequency of Social Gatherings with Friends and Family: Not on file    Attends Church Services: Not on file    Active Member of [address] [place] [address] or Organizations: Not on file    Attends Club or Organization Meetings: Not on file    Marital Status: Not on file   Intimate Partner Violence:     Fear of Current or Ex-Partner: Not on file    Emotionally Abused: Not on file    Physically Abused: Not on file    Sexually Abused: Not on file   Housing Stability:     Unable to Pay for Housing in the Last Year: Not on file    Number of [place] in the Last Year: Not on file    Unstable Housing in the Last Year: Not on file       Family History:       Problem Relation Age of Onset    High Blood Pressure Mother        Allergies:   Patient has no known allergies. Home Medications:  Prior to Admission medications    Medication Sig Start Date End Date Taking? Authorizing Provider   losartan-hydrochlorothiazide [facility]) 50-12.5 MG per tablet Take 1 tablet by mouth daily 1/8/19   Ramo Villanueva PA-C   aspirin (ASPIR-LOW) 81 MG EC tablet TAKE ONE TABLET BY MOUTH DAILY 4/11/18   aRmo Villanueva PA-C   atorvastatin (LIPITOR) 40 MG tablet TAKE ONE TABLET BY MOUTH DAILY 4/11/18   Ramo Villanueva PA-C   sitaGLIPtan-metformin (JANUMET)  MG per tablet TAKE ONE TABLET BY MOUTH DAILY WITH SUPPER 4/11/18   Ramo Villanueva PA-C       Current Medications:   Current Facility-Administered Medications: 0.9 % sodium chloride bolus, 1,000 mL, IntraVENous, Once  levETIRAcetam (KEPPRA) 1500 mg/100 mL IVPB, 1,500 mg, IntraVENous, Once    REVIEW OF SYSTEMS:       General ROS - No fevers, no chills  Ophthalmic ROS - No changes in vision from baseline  ENT ROS -  No sore throat, no rhinorrhea  Respiratory ROS - no cough, no shortness of breath  Cardiovascular ROS - No chest pain  Gastrointestinal ROS - No abdominal pain, no nausea, no vomiting  Genito-Urinary ROS - No dysuria  Musculoskeletal ROS - No neck pain, no back pain  Neurological ROS - No focal weakness or loss of sensation, no headache  Dermatological ROS - No lesions, no rash  Vascular/Lymphatic ROS - No edema    PHYSICAL EXAM:       Vitals:    02/17/22 2111   BP: 127/67   Pulse: 82   Resp: 19   Temp: 97.4 °F (36.3 °C)   SpO2: 95%     CONSTITUTIONAL:  Well developed, well nourished, alert and oriented x 3, in no acute distress. GCS 14. Nontoxic. No dysarthria. No aphasia. HEAD:  normocephalic, atraumatic    EYES:  PERRLA, EOMI.  Visual acuity and peripheral vision intact b/l   ENT:  moist mucous membranes   NECK:  supple, symmetric   LUNGS:  Equal air entry bilaterally   CARDIOVASCULAR:  normal s1 / s2, RRR, distal pulses intact   ABDOMEN: Soft, no rigidity   NEUROLOGIC:  Mental Status:  Not oriented to date, Not oriented to person and Not oriented to place,awake             Cranial Nerves:    II: Visual acuity:  normal  II: Visual fields:  normal  III: Pupils:  equal, round, reactive to light  III,IV,VI: Extra Ocular Movements: intact  V: Facial sensation:  intact  VII: Facial strength: Minimal droop left face  VIII: Hearing:  intact  IX: Palate:  intact  XI: Shoulder shrug:  intact  XII: Tongue movement:  normal     Motor Exam:    Drift: Slight drift present left upper extremity, however able to sustain antigravity  Tone:  normal     MOTOR:  RUE: 5/5  LUE: 3/5  RLE: 5/5  LLE: 4/5     Sensory:    Touch:    Right Upper Extremity:  normal  Left Upper Extremity:  normal  Right Lower Extremity:  normal  Left Lower Extremity:  normal     Clonus:  absent     Coordination/Dysmetria:  Heel to Shin:  Normal  Finger to Nose:   Normal           LABS AND IMAGING:     Labs:  CBC with Differential:    Lab Results   Component Value Date    WBC 6.9 02/17/2022    RBC 4.29 02/17/2022    HGB 13.5 02/17/2022    HCT 40.4 02/17/2022     02/17/2022    MCV 94.2 02/17/2022    MCH 31.5 02/17/2022    MCHC 33.4 02/17/2022    RDW 15.9 02/17/2022    LYMPHOPCT 20 02/17/2022    MONOPCT 8 02/17/2022    BASOPCT 0 02/17/2022    MONOSABS 0.53 02/17/2022    LYMPHSABS 1.39 02/17/2022    EOSABS 0.22 02/17/2022    BASOSABS <0.03 02/17/2022    DIFFTYPE NOT REPORTED 02/17/2022     BMP:    Lab Results   Component Value Date     02/17/2022    K 4.0 02/17/2022     02/17/2022    CO2 21 02/17/2022    BUN 9 02/17/2022    LABALBU 3.8 02/17/2022    CREATININE 0.80 02/17/2022    CREATININE 0.71 02/17/2022    CALCIUM 9.9 02/17/2022    GFRAA >60 02/17/2022    LABGLOM >60 02/17/2022    GLUCOSE 128 02/17/2022       Radiology Review: CT head shows acute on chronic right 15 mm subdural hematoma with midline shift 4 mm right to left    ASSESSMENT AND PLAN:       Patient Active Problem List   Diagnosis    Essential hypertension    Type 2 diabetes mellitus without complication, without long-term current use of insulin ([address]    Mixed hyperlipidemia    Non morbid obesity due to excess calories    Progressive cognitive dysfunction    Altered mental status    Dementia with behavioral disturbance ([address]    Dementia with behavioral problem [address] [address]       A/P:  Jazzy Gurrola is a 80 y.o. female with past medical history of dementia as well as prior CVA in August 2021 with left-sided deficits who presents with her daughter for altered mental status. Work-up was remarkable for acute on chronic right subdural hematoma measuring up to 15 mm in thickness with 4 mm right to left midline shift. Patient care will be discussed with attending, will reevaluate patient along with attending     - No neurosurgical interventions planned for now  - CTLS recommendations: None  - HOB: 30 degrees   - Obtain CT head in AM   - Neuro checks per protocol  - Hold all antiplatelets and anticoagulants  - Ok to begin prophylactic anticoagulation from neurosurgery stand point. However, we recommend careful evaluation of all other risk factors associated with anticoagulation therapy as applied to this patient's medical condition  - We recommend SBP < 160   - Determine the lower limit of SBP clinically based on mentation      Additional recommendations may follow    Please contact neurosurgery with any changes in patient's neurologic status. Thank you for your consult. Dr. Kenny Schmidt, DO   Emergency Medicine Resident, PGY-2  Neurosurgery/Neuro Critical Care Service  2/17/2022 10:49 PM      Please note that this chart was generated using voice recognition Dragon dictation software. Although every effort was made to ensure the accuracy of this automated transcription, some errors in transcription may have occurred.

## 2022-02-18 NOTE — PLAN OF CARE
Problem: Falls - Risk of:  Goal: Will remain free from falls  Description: Will remain free from falls  Outcome: Ongoing     Problem: Falls - Risk of:  Goal: Absence of physical injury  Description: Absence of physical injury  Outcome: Ongoing     Problem: HEMODYNAMIC STATUS  Goal: Patient has stable vital signs and fluid balance  Outcome: Ongoing

## 2022-02-18 NOTE — ED NOTES
Patient was found unconscious by family at home. She was alert to person, not alert to place, time situation. LKW was 2000. Patient has a history of stroke in August of 2021, with left sided deficits. Patient takes blood thinners per family, did not know name of medication or if she is currently taking them. Per family and patient, the patient did not experience a fall, just a mentation change.       Terrell Drummond RN  02/17/22 0027

## 2022-02-18 NOTE — ED NOTES
Patient and family's questions addressed. Patient in bed asleep.       Deysi Rivera RN  02/18/22 3057

## 2022-02-18 NOTE — ED NOTES
Bed: 25  Expected date:   Expected time:   Means of arrival:   Comments:  Melinda Gan RN  02/17/22 2109

## 2022-02-19 ENCOUNTER — ANESTHESIA EVENT (OUTPATIENT)
Dept: OPERATING ROOM | Age: 82
DRG: 025 | End: 2022-02-19
Payer: MEDICARE

## 2022-02-19 ENCOUNTER — APPOINTMENT (OUTPATIENT)
Dept: GENERAL RADIOLOGY | Age: 82
DRG: 025 | End: 2022-02-19
Payer: MEDICARE

## 2022-02-19 ENCOUNTER — ANESTHESIA (OUTPATIENT)
Dept: OPERATING ROOM | Age: 82
DRG: 025 | End: 2022-02-19
Payer: MEDICARE

## 2022-02-19 ENCOUNTER — APPOINTMENT (OUTPATIENT)
Dept: CT IMAGING | Age: 82
DRG: 025 | End: 2022-02-19
Payer: MEDICARE

## 2022-02-19 VITALS — SYSTOLIC BLOOD PRESSURE: 133 MMHG | OXYGEN SATURATION: 100 % | TEMPERATURE: 96.7 F | DIASTOLIC BLOOD PRESSURE: 67 MMHG

## 2022-02-19 LAB
ABSOLUTE EOS #: 0 K/UL (ref 0–0.4)
ABSOLUTE IMMATURE GRANULOCYTE: 0 K/UL (ref 0–0.3)
ABSOLUTE LYMPH #: 0.32 K/UL (ref 1–4.8)
ABSOLUTE MONO #: 0.08 K/UL (ref 0.1–0.8)
ANION GAP SERPL CALCULATED.3IONS-SCNC: 13 MMOL/L (ref 9–17)
BASOPHILS # BLD: 0 % (ref 0–2)
BASOPHILS ABSOLUTE: 0 K/UL (ref 0–0.2)
BUN BLDV-MCNC: 6 MG/DL (ref 8–23)
BUN/CREAT BLD: ABNORMAL (ref 9–20)
CALCIUM SERPL-MCNC: 8.9 MG/DL (ref 8.6–10.4)
CHLORIDE BLD-SCNC: 106 MMOL/L (ref 98–107)
CO2: 19 MMOL/L (ref 20–31)
CREAT SERPL-MCNC: 0.72 MG/DL (ref 0.5–0.9)
DIFFERENTIAL TYPE: ABNORMAL
EOSINOPHILS RELATIVE PERCENT: 0 % (ref 1–4)
GFR AFRICAN AMERICAN: >60 ML/MIN
GFR NON-AFRICAN AMERICAN: >60 ML/MIN
GFR SERPL CREATININE-BSD FRML MDRD: ABNORMAL ML/MIN/{1.73_M2}
GFR SERPL CREATININE-BSD FRML MDRD: ABNORMAL ML/MIN/{1.73_M2}
GLUCOSE BLD-MCNC: 138 MG/DL (ref 70–99)
HCT VFR BLD CALC: 39.4 % (ref 36.3–47.1)
HEMOGLOBIN: 12.9 G/DL (ref 11.9–15.1)
IMMATURE GRANULOCYTES: 0 %
LYMPHOCYTES # BLD: 4 % (ref 24–44)
MCH RBC QN AUTO: 31.2 PG (ref 25.2–33.5)
MCHC RBC AUTO-ENTMCNC: 32.7 G/DL (ref 28.4–34.8)
MCV RBC AUTO: 95.4 FL (ref 82.6–102.9)
MONOCYTES # BLD: 1 % (ref 1–7)
MORPHOLOGY: ABNORMAL
NRBC AUTOMATED: 0 PER 100 WBC
PDW BLD-RTO: 15.9 % (ref 11.8–14.4)
PLATELET # BLD: 198 K/UL (ref 138–453)
PLATELET ESTIMATE: ABNORMAL
PMV BLD AUTO: 10.1 FL (ref 8.1–13.5)
POTASSIUM SERPL-SCNC: 4.4 MMOL/L (ref 3.7–5.3)
RBC # BLD: 4.13 M/UL (ref 3.95–5.11)
RBC # BLD: ABNORMAL 10*6/UL
SEG NEUTROPHILS: 95 % (ref 36–66)
SEGMENTED NEUTROPHILS ABSOLUTE COUNT: 7.7 K/UL (ref 1.8–7.7)
SODIUM BLD-SCNC: 138 MMOL/L (ref 135–144)
WBC # BLD: 8.1 K/UL (ref 3.5–11.3)
WBC # BLD: ABNORMAL 10*3/UL

## 2022-02-19 PROCEDURE — 99233 SBSQ HOSP IP/OBS HIGH 50: CPT | Performed by: PSYCHIATRY & NEUROLOGY

## 2022-02-19 PROCEDURE — 2720000010 HC SURG SUPPLY STERILE: Performed by: NEUROLOGICAL SURGERY

## 2022-02-19 PROCEDURE — 6360000002 HC RX W HCPCS: Performed by: NURSE ANESTHETIST, CERTIFIED REGISTERED

## 2022-02-19 PROCEDURE — 2700000000 HC OXYGEN THERAPY PER DAY

## 2022-02-19 PROCEDURE — 87186 SC STD MICRODIL/AGAR DIL: CPT

## 2022-02-19 PROCEDURE — C1729 CATH, DRAINAGE: HCPCS | Performed by: NEUROLOGICAL SURGERY

## 2022-02-19 PROCEDURE — 7100000001 HC PACU RECOVERY - ADDTL 15 MIN: Performed by: NEUROLOGICAL SURGERY

## 2022-02-19 PROCEDURE — 70250 X-RAY EXAM OF SKULL: CPT

## 2022-02-19 PROCEDURE — 2580000003 HC RX 258: Performed by: NURSE ANESTHETIST, CERTIFIED REGISTERED

## 2022-02-19 PROCEDURE — 2580000003 HC RX 258: Performed by: PSYCHIATRY & NEUROLOGY

## 2022-02-19 PROCEDURE — 3600000014 HC SURGERY LEVEL 4 ADDTL 15MIN: Performed by: NEUROLOGICAL SURGERY

## 2022-02-19 PROCEDURE — 7100000000 HC PACU RECOVERY - FIRST 15 MIN: Performed by: NEUROLOGICAL SURGERY

## 2022-02-19 PROCEDURE — 2709999900 HC NON-CHARGEABLE SUPPLY: Performed by: NEUROLOGICAL SURGERY

## 2022-02-19 PROCEDURE — 2580000003 HC RX 258: Performed by: REGISTERED NURSE

## 2022-02-19 PROCEDURE — 2500000003 HC RX 250 WO HCPCS: Performed by: NURSE ANESTHETIST, CERTIFIED REGISTERED

## 2022-02-19 PROCEDURE — 36556 INSERT NON-TUNNEL CV CATH: CPT

## 2022-02-19 PROCEDURE — 80048 BASIC METABOLIC PNL TOTAL CA: CPT

## 2022-02-19 PROCEDURE — 2580000003 HC RX 258: Performed by: STUDENT IN AN ORGANIZED HEALTH CARE EDUCATION/TRAINING PROGRAM

## 2022-02-19 PROCEDURE — 2580000003 HC RX 258: Performed by: NEUROLOGICAL SURGERY

## 2022-02-19 PROCEDURE — 36415 COLL VENOUS BLD VENIPUNCTURE: CPT

## 2022-02-19 PROCEDURE — APPNB30 APP NON BILLABLE TIME 0-30 MINS: Performed by: REGISTERED NURSE

## 2022-02-19 PROCEDURE — 6370000000 HC RX 637 (ALT 250 FOR IP): Performed by: NEUROLOGICAL SURGERY

## 2022-02-19 PROCEDURE — 009430Z DRAINAGE OF INTRACRANIAL SUBDURAL SPACE WITH DRAINAGE DEVICE, PERCUTANEOUS APPROACH: ICD-10-PCS | Performed by: NEUROLOGICAL SURGERY

## 2022-02-19 PROCEDURE — 85025 COMPLETE CBC W/AUTO DIFF WBC: CPT

## 2022-02-19 PROCEDURE — A4217 STERILE WATER/SALINE, 500 ML: HCPCS | Performed by: NEUROLOGICAL SURGERY

## 2022-02-19 PROCEDURE — 6360000002 HC RX W HCPCS: Performed by: REGISTERED NURSE

## 2022-02-19 PROCEDURE — 3700000000 HC ANESTHESIA ATTENDED CARE: Performed by: NEUROLOGICAL SURGERY

## 2022-02-19 PROCEDURE — 2500000003 HC RX 250 WO HCPCS: Performed by: NEUROLOGICAL SURGERY

## 2022-02-19 PROCEDURE — 2000000003 HC NEURO ICU R&B

## 2022-02-19 PROCEDURE — 3700000001 HC ADD 15 MINUTES (ANESTHESIA): Performed by: NEUROLOGICAL SURGERY

## 2022-02-19 PROCEDURE — 87086 URINE CULTURE/COLONY COUNT: CPT

## 2022-02-19 PROCEDURE — 06HY33Z INSERTION OF INFUSION DEVICE INTO LOWER VEIN, PERCUTANEOUS APPROACH: ICD-10-PCS | Performed by: PSYCHIATRY & NEUROLOGY

## 2022-02-19 PROCEDURE — 3600000004 HC SURGERY LEVEL 4 BASE: Performed by: NEUROLOGICAL SURGERY

## 2022-02-19 PROCEDURE — 70450 CT HEAD/BRAIN W/O DYE: CPT

## 2022-02-19 PROCEDURE — 87077 CULTURE AEROBIC IDENTIFY: CPT

## 2022-02-19 RX ORDER — TRAMADOL HYDROCHLORIDE 50 MG/1
50 TABLET ORAL
Status: DISCONTINUED | OUTPATIENT
Start: 2022-02-19 | End: 2022-02-19 | Stop reason: HOSPADM

## 2022-02-19 RX ORDER — SODIUM CHLORIDE 9 MG/ML
25 INJECTION, SOLUTION INTRAVENOUS PRN
Status: DISCONTINUED | OUTPATIENT
Start: 2022-02-19 | End: 2022-02-19 | Stop reason: HOSPADM

## 2022-02-19 RX ORDER — ROCURONIUM BROMIDE 10 MG/ML
INJECTION, SOLUTION INTRAVENOUS PRN
Status: DISCONTINUED | OUTPATIENT
Start: 2022-02-19 | End: 2022-02-19 | Stop reason: SDUPTHER

## 2022-02-19 RX ORDER — PROPOFOL 10 MG/ML
INJECTION, EMULSION INTRAVENOUS PRN
Status: DISCONTINUED | OUTPATIENT
Start: 2022-02-19 | End: 2022-02-19 | Stop reason: SDUPTHER

## 2022-02-19 RX ORDER — DIPHENHYDRAMINE HYDROCHLORIDE 50 MG/ML
12.5 INJECTION INTRAMUSCULAR; INTRAVENOUS
Status: DISCONTINUED | OUTPATIENT
Start: 2022-02-19 | End: 2022-02-19 | Stop reason: HOSPADM

## 2022-02-19 RX ORDER — GINSENG 100 MG
CAPSULE ORAL PRN
Status: DISCONTINUED | OUTPATIENT
Start: 2022-02-19 | End: 2022-02-19 | Stop reason: HOSPADM

## 2022-02-19 RX ORDER — LABETALOL HYDROCHLORIDE 5 MG/ML
10 INJECTION, SOLUTION INTRAVENOUS
Status: DISCONTINUED | OUTPATIENT
Start: 2022-02-19 | End: 2022-02-19 | Stop reason: HOSPADM

## 2022-02-19 RX ORDER — DROPERIDOL 2.5 MG/ML
0.62 INJECTION, SOLUTION INTRAMUSCULAR; INTRAVENOUS
Status: DISCONTINUED | OUTPATIENT
Start: 2022-02-19 | End: 2022-02-19 | Stop reason: HOSPADM

## 2022-02-19 RX ORDER — SODIUM CHLORIDE 0.9 % (FLUSH) 0.9 %
5-40 SYRINGE (ML) INJECTION PRN
Status: DISCONTINUED | OUTPATIENT
Start: 2022-02-19 | End: 2022-02-19 | Stop reason: HOSPADM

## 2022-02-19 RX ORDER — FENTANYL CITRATE 50 UG/ML
25 INJECTION, SOLUTION INTRAMUSCULAR; INTRAVENOUS EVERY 5 MIN PRN
Status: DISCONTINUED | OUTPATIENT
Start: 2022-02-19 | End: 2022-02-19 | Stop reason: HOSPADM

## 2022-02-19 RX ORDER — FENTANYL CITRATE 50 UG/ML
INJECTION, SOLUTION INTRAMUSCULAR; INTRAVENOUS PRN
Status: DISCONTINUED | OUTPATIENT
Start: 2022-02-19 | End: 2022-02-19 | Stop reason: SDUPTHER

## 2022-02-19 RX ORDER — LIDOCAINE HYDROCHLORIDE AND EPINEPHRINE 10; 10 MG/ML; UG/ML
INJECTION, SOLUTION INFILTRATION; PERINEURAL PRN
Status: DISCONTINUED | OUTPATIENT
Start: 2022-02-19 | End: 2022-02-19 | Stop reason: HOSPADM

## 2022-02-19 RX ORDER — MAGNESIUM HYDROXIDE 1200 MG/15ML
LIQUID ORAL CONTINUOUS PRN
Status: COMPLETED | OUTPATIENT
Start: 2022-02-19 | End: 2022-02-19

## 2022-02-19 RX ORDER — LEVETIRACETAM 5 MG/ML
500 INJECTION INTRAVASCULAR EVERY 12 HOURS
Status: DISCONTINUED | OUTPATIENT
Start: 2022-02-19 | End: 2022-02-22

## 2022-02-19 RX ORDER — PROCHLORPERAZINE EDISYLATE 5 MG/ML
5 INJECTION INTRAMUSCULAR; INTRAVENOUS
Status: DISCONTINUED | OUTPATIENT
Start: 2022-02-19 | End: 2022-02-19 | Stop reason: HOSPADM

## 2022-02-19 RX ORDER — SODIUM CHLORIDE 0.9 % (FLUSH) 0.9 %
5-40 SYRINGE (ML) INJECTION EVERY 12 HOURS SCHEDULED
Status: DISCONTINUED | OUTPATIENT
Start: 2022-02-19 | End: 2022-02-19 | Stop reason: HOSPADM

## 2022-02-19 RX ORDER — DEXAMETHASONE SODIUM PHOSPHATE 10 MG/ML
INJECTION INTRAMUSCULAR; INTRAVENOUS PRN
Status: DISCONTINUED | OUTPATIENT
Start: 2022-02-19 | End: 2022-02-19 | Stop reason: SDUPTHER

## 2022-02-19 RX ORDER — SODIUM CHLORIDE 9 MG/ML
INJECTION, SOLUTION INTRAVENOUS CONTINUOUS PRN
Status: DISCONTINUED | OUTPATIENT
Start: 2022-02-19 | End: 2022-02-19 | Stop reason: SDUPTHER

## 2022-02-19 RX ORDER — LIDOCAINE HYDROCHLORIDE 10 MG/ML
INJECTION, SOLUTION EPIDURAL; INFILTRATION; INTRACAUDAL; PERINEURAL PRN
Status: DISCONTINUED | OUTPATIENT
Start: 2022-02-19 | End: 2022-02-19 | Stop reason: SDUPTHER

## 2022-02-19 RX ADMIN — SODIUM CHLORIDE, PRESERVATIVE FREE 10 ML: 5 INJECTION INTRAVENOUS at 00:33

## 2022-02-19 RX ADMIN — LIDOCAINE HYDROCHLORIDE 50 MG: 10 INJECTION, SOLUTION EPIDURAL; INFILTRATION; INTRACAUDAL; PERINEURAL at 10:22

## 2022-02-19 RX ADMIN — ROCURONIUM BROMIDE 40 MG: 10 INJECTION INTRAVENOUS at 10:22

## 2022-02-19 RX ADMIN — FENTANYL CITRATE 25 MCG: 50 INJECTION, SOLUTION INTRAMUSCULAR; INTRAVENOUS at 11:24

## 2022-02-19 RX ADMIN — SODIUM CHLORIDE, PRESERVATIVE FREE 10 ML: 5 INJECTION INTRAVENOUS at 18:54

## 2022-02-19 RX ADMIN — CEFAZOLIN SODIUM 2000 MG: 10 INJECTION, POWDER, FOR SOLUTION INTRAVENOUS at 16:49

## 2022-02-19 RX ADMIN — SUGAMMADEX 150 MG: 100 INJECTION, SOLUTION INTRAVENOUS at 11:19

## 2022-02-19 RX ADMIN — PHENYLEPHRINE HYDROCHLORIDE 100 MCG: 10 INJECTION INTRAVENOUS at 11:08

## 2022-02-19 RX ADMIN — DEXAMETHASONE SODIUM PHOSPHATE 4 MG: 10 INJECTION INTRAMUSCULAR; INTRAVENOUS at 11:00

## 2022-02-19 RX ADMIN — SODIUM CHLORIDE, PRESERVATIVE FREE 10 ML: 5 INJECTION INTRAVENOUS at 21:00

## 2022-02-19 RX ADMIN — CEFAZOLIN SODIUM 2000 MG: 10 INJECTION, POWDER, FOR SOLUTION INTRAVENOUS at 10:34

## 2022-02-19 RX ADMIN — PHENYLEPHRINE HYDROCHLORIDE 100 MCG: 10 INJECTION INTRAVENOUS at 10:56

## 2022-02-19 RX ADMIN — SODIUM CHLORIDE: 9 INJECTION, SOLUTION INTRAVENOUS at 10:00

## 2022-02-19 RX ADMIN — CEFAZOLIN SODIUM 2000 MG: 10 INJECTION, POWDER, FOR SOLUTION INTRAVENOUS at 23:58

## 2022-02-19 RX ADMIN — PHENYLEPHRINE HYDROCHLORIDE 100 MCG: 10 INJECTION INTRAVENOUS at 10:50

## 2022-02-19 RX ADMIN — SODIUM CHLORIDE: 9 INJECTION, SOLUTION INTRAVENOUS at 18:38

## 2022-02-19 RX ADMIN — PHENYLEPHRINE HYDROCHLORIDE 200 MCG: 10 INJECTION INTRAVENOUS at 10:28

## 2022-02-19 RX ADMIN — LEVETIRACETAM 500 MG: 5 INJECTION INTRAVENOUS at 16:29

## 2022-02-19 RX ADMIN — PHENYLEPHRINE HYDROCHLORIDE 150 MCG: 10 INJECTION INTRAVENOUS at 10:34

## 2022-02-19 RX ADMIN — SODIUM CHLORIDE: 9 INJECTION, SOLUTION INTRAVENOUS at 00:31

## 2022-02-19 RX ADMIN — PROPOFOL 110 MG: 10 INJECTION, EMULSION INTRAVENOUS at 10:22

## 2022-02-19 RX ADMIN — FENTANYL CITRATE 100 MCG: 50 INJECTION, SOLUTION INTRAMUSCULAR; INTRAVENOUS at 10:22

## 2022-02-19 RX ADMIN — PHENYLEPHRINE HYDROCHLORIDE 150 MCG: 10 INJECTION INTRAVENOUS at 10:42

## 2022-02-19 ASSESSMENT — PULMONARY FUNCTION TESTS
PIF_VALUE: 16
PIF_VALUE: 2
PIF_VALUE: 15
PIF_VALUE: 16
PIF_VALUE: 17
PIF_VALUE: 15
PIF_VALUE: 17
PIF_VALUE: 18
PIF_VALUE: 15
PIF_VALUE: 19
PIF_VALUE: 15
PIF_VALUE: 15
PIF_VALUE: 19
PIF_VALUE: 15
PIF_VALUE: 15
PIF_VALUE: 17
PIF_VALUE: 18
PIF_VALUE: 16
PIF_VALUE: 19
PIF_VALUE: 16
PIF_VALUE: 14
PIF_VALUE: 17
PIF_VALUE: 16
PIF_VALUE: 15
PIF_VALUE: 3
PIF_VALUE: 15
PIF_VALUE: 2
PIF_VALUE: 3
PIF_VALUE: 15
PIF_VALUE: 26
PIF_VALUE: 16
PIF_VALUE: 19
PIF_VALUE: 17
PIF_VALUE: 17
PIF_VALUE: 15
PIF_VALUE: 14
PIF_VALUE: 17
PIF_VALUE: 2
PIF_VALUE: 15
PIF_VALUE: 19
PIF_VALUE: 16
PIF_VALUE: 15
PIF_VALUE: 17
PIF_VALUE: 15
PIF_VALUE: 15
PIF_VALUE: 19
PIF_VALUE: 16
PIF_VALUE: 19
PIF_VALUE: 19
PIF_VALUE: 16
PIF_VALUE: 15
PIF_VALUE: 8
PIF_VALUE: 15
PIF_VALUE: 2
PIF_VALUE: 15
PIF_VALUE: 15
PIF_VALUE: 17
PIF_VALUE: 2
PIF_VALUE: 15
PIF_VALUE: 14
PIF_VALUE: 22
PIF_VALUE: 18
PIF_VALUE: 17
PIF_VALUE: 19
PIF_VALUE: 16
PIF_VALUE: 16
PIF_VALUE: 19
PIF_VALUE: 17
PIF_VALUE: 18
PIF_VALUE: 15
PIF_VALUE: 14
PIF_VALUE: 18
PIF_VALUE: 16
PIF_VALUE: 14
PIF_VALUE: 17
PIF_VALUE: 16
PIF_VALUE: 18
PIF_VALUE: 19
PIF_VALUE: 2
PIF_VALUE: 19
PIF_VALUE: 18
PIF_VALUE: 16
PIF_VALUE: 17
PIF_VALUE: 19

## 2022-02-19 ASSESSMENT — PAIN SCALES - GENERAL: PAINLEVEL_OUTOF10: 0

## 2022-02-19 NOTE — PROGRESS NOTES
Dr. Senthil Licea and Dr. Tigist Krishnamurthy at bedside to evaluate patient. Called 5B to give report to [employer].

## 2022-02-19 NOTE — PROGRESS NOTES
Physical Therapy        Physical Therapy Cancel Note      DATE: 2022    NAME: Regine Luz  MRN: [id number]   : 1940      Patient not seen this date for Physical Therapy due to:    Surgery/Procedure: Plan for OR today with neurosurgery. PT will check back .       Electronically signed by Jennifer Yoo PT on 2022 at 3:14 PM

## 2022-02-19 NOTE — PROGRESS NOTES
for Or today with NSG       CURRENT MEDICATIONS:  SCHEDULED MEDICATIONS:   [MAR Hold] atorvastatin  40 mg Oral Daily    [MAR Hold] sodium chloride flush  5-40 mL IntraVENous 2 times per day    [MAR Hold] losartan  50 mg Oral Daily    And    [MAR Hold] hydroCHLOROthiazide  12.5 mg Oral Daily    [MAR Hold] metFORMIN  1,000 mg Oral BID WC    And    [MAR Hold] alogliptin  12.5 mg Oral Daily     CONTINUOUS INFUSIONS:   [MAR Hold] sodium chloride      [MAR Hold] sodium chloride 50 mL/hr at 22 0031     PRN MEDICATIONS:   [ Hold] sodium chloride flush, [MAR Hold] sodium chloride, [MAR Hold] ondansetron **OR** [MAR Hold] ondansetron, [MAR Hold] polyethylene glycol, [MAR Hold] acetaminophen    VITALS:  Temperature Range: Temp: 96.8 °F (36 °C) Temp  Av.7 °F (35.9 °C)  Min: 96.5 °F (35.8 °C)  Max: 98.3 °F (36.8 °C)  BP Range: Systolic (49NII), EQC:196 , Min:48 , EZV:830     Diastolic (09KMT), FJR:75, Min:18, Max:104    Pulse Range: Pulse  Av.5  Min: 55  Max: 104  Respiration Range: Resp  Av.2  Min: 0  Max: 23  Current Pulse Ox: SpO2: 99 %  24HR Pulse Ox Range: SpO2  Av %  Min: 55 %  Max: 100 %  Patient Vitals for the past 12 hrs:   BP Temp Temp src Pulse Resp SpO2   22 1230 133/69 96.8 °F (36 °C) Temporal 76 20 99 %   22 1215 127/66 -- -- 80 19 99 %   22 1210 -- -- -- -- -- 100 %   22 1155 132/62 96.8 °F (36 °C) Temporal 80 19 100 %   22 0945 (!) 112/54 97.2 °F (36.2 °C) Temporal 77 16 98 %   22 0800 133/82 97.3 °F (36.3 °C) -- 92 19 98 %   22 0700 (!) 152/70 -- -- 83 18 99 %   22 0600 -- -- -- -- -- 96 %   22 0503 (!) 157/74 -- -- 104 23 97 %   22 0303 (!) 145/63 97 °F (36.1 °C) -- 88 23 95 %   22 0203 (!) 142/72 -- -- 93 20 95 %   22 0103 (!) 174/79 -- -- 89 16 95 %     Estimated body mass index is 28.39 kg/m² as calculated from the following:    Height as of 3/23/18: 5' 2\" (1.575 m).     Weight as of 18: 155 lb 3.2 oz (70.4 kg).  []<16 Severe malnutrition  []16-16.99 Moderate malnutrition  []17-18.49 Mild malnutrition  []18.5-24.9 Normal  []25-29.9 Overweight (not obese)  []30-34.9 Obese class 1 (Low Risk)  []35-39.9 Obese class 2 (Moderate Risk)  []?40 Obese class 3 (High Risk)    RECENT LABS:   Lab Results   Component Value Date    WBC 4.7 02/18/2022    HGB 13.7 02/18/2022    HCT 41.1 02/18/2022     02/18/2022    CHOL 137 10/19/2017    TRIG 56 10/19/2017    HDL 92 10/19/2017    ALT 12 02/17/2022    AST 21 02/17/2022     02/18/2022    K 3.8 02/18/2022     02/18/2022    CREATININE 0.58 02/18/2022    BUN 7 (L) 02/18/2022    CO2 20 02/18/2022    TSH 3.49 03/14/2018    INR 1.2 02/17/2022    LABA1C 5.9 10/19/2017    LABMICR 8 10/10/2016     24 HOUR INTAKE/OUTPUT:    Intake/Output Summary (Last 24 hours) at 2/19/2022 1255  Last data filed at 2/19/2022 1126  Gross per 24 hour   Intake 740 ml   Output 250 ml   Net 490 ml       IMAGING:       Labs and Images reviewed with:  [] Dr. Judi Dean. Amee    [] Dr. Santhosh Cameron  [x] Dr. Vladislav Baez  [] There are no new interval images to review. PHYSICAL EXAM       CONSTITUTIONAL:  Well developed, well nourished, alert and oriented x 3, in no acute distress. GCS 15.  Speech. No dysarthria or aphasia. HEAD:  normocephalic, atraumatic    EYES:  PERRLA, EOMI. no gaze deviation.    ENT:  moist mucous membranes   NECK:  supple, symmetric   LUNGS:  Equal air entry bilaterally   CARDIOVASCULAR:  normal s1 / s2, RRR, distal pulses intact   ABDOMEN:  Soft, no rigidity   NEUROLOGIC:  Mental Status:  Not oriented to date, Not oriented to person and Not oriented to place,awake             Cranial Nerves:    II: Visual acuity:  normal  II: Visual fields:  normal  III: Pupils:  equal, round, reactive to light  III,IV,VI: Extra Ocular Movements: intact  V: Facial sensation:  intact  VII: Facial strength: Minimal droop left face  VIII: Hearing:  intact  IX: Palate:  intact  XI: Shoulder shrug:  intact  XII: Tongue movement:  normal     Motor Exam:    Drift: Slight drift present left upper extremity, however able to sustain antigravity  Tone:  normal     MOTOR:  RUE: 5/5  LUE: 3/5  RLE: 5/5  LLE: 4/5     Sensory:    Touch:    Right Upper Extremity:  normal  Left Upper Extremity:  normal  Right Lower Extremity:  normal  Left Lower Extremity:  normal     Clonus:  absent     Coordination/Dysmetria:  Heel to Shin:  Normal  Finger to Nose:   Normal     TOTAL:     DRAINS:  [] There are no drains for Neuro Critical Care to monitor at this time. ASSESSMENT AND PLAN:          ASSESSMENT:      This is a 80 y.o. 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 She does have a history of dementia however daughter was concerned as patient was acting different from her baseline today. CT in the emergency department revealed subdural hematoma with shift. Neurosurgery and neuro critical care consulted out of the emergency department.       PLAN/MEDICAL DECISION MAKING:     NEUROLOGIC:  - Imaging CT head demonstrates acute on chronic right subdural hematoma with 4 mm and shift  -Repeat CT head stable  -N.p.o. after midnight  -OR today.  -Neurosurgery on board,  -SBP goal less than 160     CARDIOVASCULAR:  - Goal SBP less than 160  -Continue on telemetry as directed     PULMONARY:  -Monitor for any change in the patient's respiratory status     RENAL/FLUID/ELECTROLYTE:  7/0.58  Monitor UOP       GI/NUTRITION:  NUTRITION:  N.p.o. after midnight  - Bowel regimen: Zofran, GlycoLax  - GI prophylaxis: None       ID:  -Monitor for hypo and hyperthermia.   -Tylenol as needed if T-max greater than 101.  -Panculture if febrile.       HEME:   -hgb 13.7  Platelets 019     ENDOCRINE:  - Continue to monitor blood glucose, goal <180       OTHER:  - PT/OT/ST   - Code Status: Prior     PROPHYLAXIS:  Stress ulcer: Not indicated     DVT PROPHYLAXIS:  - SCD sleeves - Thigh High     We will continue to follow along. For any changes in exam or patient status please contact Neuro Critical Care.       Lorna Fishman DO  Neuro Critical Care  Pager [phone]  2/19/2022     12:55 PM

## 2022-02-19 NOTE — ANESTHESIA PRE PROCEDURE
Department of Anesthesiology  Preprocedure Note       Name:  Sherice Mallory   Age:  80 y.o.  :  1940                                          MRN:  [id number]         Date:  2022      Surgeon: Dennise Temple):  Yang Michaels MD    Procedure: Procedure(s):  GILLIAN HOLE FOR HEMATOMA EVACUATION    Medications prior to admission:   Prior to Admission medications    Medication Sig Start Date End Date Taking?  Authorizing Provider   losartan-hydrochlorothiazide [facility]) 50-12.5 MG per tablet Take 1 tablet by mouth daily 19   Shaina Mckoy PA-C   aspirin (ASPIR-LOW) 81 MG EC tablet TAKE ONE TABLET BY MOUTH DAILY 18   Shaina Mckoy PA-C   atorvastatin (LIPITOR) 40 MG tablet TAKE ONE TABLET BY MOUTH DAILY 18   Shaina Mckoy PA-C   sitaGLIPtan-metformin (JANUMET)  MG per tablet TAKE ONE TABLET BY MOUTH DAILY WITH SUPPER 18   Shaina Mckoy PA-C       Current medications:    Current Facility-Administered Medications   Medication Dose Route Frequency Provider Last Rate Last Admin    [facility] Hold] atorvastatin (LIPITOR) tablet 40 mg  40 mg Oral Daily Harlen Saris, DO   40 mg at 22 0846    [MAR Hold] sodium chloride flush 0.9 % injection 5-40 mL  5-40 mL IntraVENous 2 times per day Elvin Lópezis, DO   10 mL at 22 0033    [MAR Hold] sodium chloride flush 0.9 % injection 5-40 mL  5-40 mL IntraVENous PRN Elvin Saris, DO        [facility] Hold] 0.9 % sodium chloride infusion  25 mL IntraVENous PRN Penglen Saris, DO        [facility] Hold] ondansetron (ZOFRAN-ODT) disintegrating tablet 4 mg  4 mg Oral Q8H PRN Penglen Saris, DO        Or    [MAR Hold] ondansetron [facility]) injection 4 mg  4 mg IntraVENous Q6H PRN Harlen Saris, DO        [MAR Hold] polyethylene glycol (GLYCOLAX) packet 17 g  17 g Oral Daily PRN Elvin Lópezis, DO        [MAR Hold] acetaminophen (TYLENOL) tablet 650 mg  650 mg Oral Q6H PRN Elvin Hoyt DO        [MAR Hold] losartan (COZAAR) tablet 50 mg 50 mg Oral Daily Freddie Rocher, DO   50 mg at 22 0846    And    [MAR Hold] hydroCHLOROthiazide (HYDRODIURIL) tablet 12.5 mg  12.5 mg Oral Daily Freddie Rocher, DO   12.5 mg at 22 0846    [MAR Hold] metFORMIN (GLUCOPHAGE) tablet 1,000 mg  1,000 mg Oral BID WC Freddie Rocher, DO   1,000 mg at 22 1728    And    [MAR Hold] alogliptin (NESINA) tablet 12.5 mg  12.5 mg Oral Daily Freddie Rocher, DO   12.5 mg at 22 0846    [MAR Hold] 0.9 % sodium chloride infusion   IntraVENous Continuous Yee Jaeger MD 50 mL/hr at 22 0031 New Bag at 22 0031       Allergies:  No Known Allergies    Problem List:    Patient Active Problem List   Diagnosis Code    Essential hypertension I10    Type 2 diabetes mellitus without complication, without long-term current use of insulin ([facility] [address] E11.9    Mixed hyperlipidemia E78.2    Non morbid obesity due to excess calories E66.09    Progressive cognitive dysfunction F09    Altered mental status R41.82    Dementia with behavioral disturbance ([facility] [address] F03.91    Dementia with behavioral problem ([facility] [address] F03.91    Subdural hematoma ([facility] [address] O66.1A3U       Past Medical History:        Diagnosis Date    Gout     Hyperlipidemia     Hypertension     Type II or unspecified type diabetes mellitus without mention of complication, not stated as uncontrolled        Past Surgical History:  No past surgical history on file.     Social History:    Social History     Tobacco Use    Smoking status: Former Smoker     Years: 20.00     Types: Cigarettes     Quit date: 1980     Years since quittin.8    Smokeless tobacco: Never Used   Substance Use Topics    Alcohol use: Not on file                                Counseling given: Not Answered      Vital Signs (Current):   Vitals:    22 0503 22 0600 22 0700 22 0800   BP: (!) 157/74  (!) 152/70 133/82   Pulse: 104  83 92   Resp:    Temp:    97.3 °F (36.3 °C)   TempSrc:       SpO2: 97% 96% 99% 98%                                              BP Readings from Last 3 Encounters:   02/19/22 133/82   04/11/18 (!) 171/88   03/26/18 130/76       NPO Status: Time of last liquid consumption: 2000                        Time of last solid consumption: 2000                        Date of last liquid consumption: 02/18/22                        Date of last solid food consumption: 02/18/22    BMI:   Wt Readings from Last 3 Encounters:   04/11/18 155 lb 3.2 oz (70.4 kg)   03/23/18 155 lb (70.3 kg)   03/18/18 156 lb 11.2 oz (71.1 kg)     There is no height or weight on file to calculate BMI.    CBC:   Lab Results   Component Value Date    WBC 4.7 02/18/2022    RBC 4.36 02/18/2022    HGB 13.7 02/18/2022    HCT 41.1 02/18/2022    MCV 94.3 02/18/2022    RDW 15.5 02/18/2022     02/18/2022       CMP:   Lab Results   Component Value Date     02/18/2022    K 3.8 02/18/2022     02/18/2022    CO2 20 02/18/2022    BUN 7 02/18/2022    CREATININE 0.58 02/18/2022    GFRAA >60 02/18/2022    LABGLOM >60 02/18/2022    GLUCOSE 92 02/18/2022    PROT 6.8 02/17/2022    CALCIUM 9.4 02/18/2022    BILITOT 0.58 02/17/2022    ALKPHOS 72 02/17/2022    AST 21 02/17/2022    ALT 12 02/17/2022       POC Tests:   Recent Labs     02/17/22 2122   POCGLU 129*   POCNA 142   POCK 3.3*   POCCL 107   POCBUN 8   POCHEMO 12.9   POCHCT 38       Coags:   Lab Results   Component Value Date    PROTIME 12.4 02/17/2022    INR 1.2 02/17/2022    APTT 24.4 02/17/2022       HCG (If Applicable): No results found for: PREGTESTUR, PREGSERUM, HCG, HCGQUANT     ABGs: No results found for: PHART, PO2ART, UHV8NZC, QMD5GSP, BEART, A8OARAXE     Type & Screen (If Applicable):  No results found for: LABABO, LABRH    Drug/Infectious Status (If Applicable):  No results found for: HIV, HEPCAB    COVID-19 Screening (If Applicable):   Lab Results   Component Value Date    COVID19 Not Detected 02/17/2022           Anesthesia Evaluation  Nursing notes reviewed no history of anesthetic complications:   Airway: Mallampati: Unable to assess / NA        Dental:    (+) poor dentition      Pulmonary: breath sounds clear to auscultation                             Cardiovascular:    (+) hypertension:,         Rhythm: regular  Rate: normal                    Neuro/Psych:   (+) psychiatric history: stable with treatment             ROS comment: dementia GI/Hepatic/Renal:             Endo/Other:    (+) DiabetesType II DM, , .                 Abdominal:             Vascular: Other Findings: Base line dementia. Information gathered and chart signed by POA (cousin)            Anesthesia Plan      general     ASA 4       Induction: intravenous. MIPS: Postoperative opioids intended, Prophylactic antiemetics administered and Postoperative trial extubation. Anesthetic plan and risks discussed with healthcare power of . Use of blood products discussed with healthcare power of  whom consented to blood products. Plan discussed with CRNA.                   Korey Paulson MD   2/19/2022

## 2022-02-19 NOTE — PLAN OF CARE
Problem: Falls - Risk of:  Goal: Will remain free from falls  Description: Will remain free from falls  2/19/2022 1757 by Maame Vines RN  Outcome: Ongoing  2/19/2022 1756 by Maame Vines RN  Outcome: Ongoing  2/19/2022 0720 by Myra Perea RN  Outcome: Ongoing  Goal: Absence of physical injury  Description: Absence of physical injury  2/19/2022 1757 by Maame Vines RN  Outcome: Ongoing  2/19/2022 1756 by Maame Vines RN  Outcome: Ongoing  2/19/2022 0720 by Myra Perea RN  Outcome: Ongoing     Problem: Safety:  Goal: Free from accidental physical injury  Description: Free from accidental physical injury  2/19/2022 1757 by Maame Vines RN  Outcome: Ongoing  2/19/2022 1756 by Maame Vines RN  Outcome: Ongoing  2/19/2022 0720 by Myra Perea RN  Outcome: Ongoing  Goal: Free from intentional harm  Description: Free from intentional harm  2/19/2022 1757 by Maame Vines RN  Outcome: Ongoing  2/19/2022 1756 by Maame Vines RN  Outcome: Ongoing  2/19/2022 0720 by Myra Perea RN  Outcome: Ongoing     Problem: Pain:  Goal: Patient's pain/discomfort is manageable  Description: Patient's pain/discomfort is manageable  2/19/2022 1757 by Maame Vines RN  Outcome: Ongoing  2/19/2022 1756 by Maame Vines RN  Outcome: Ongoing  2/19/2022 0720 by Myra Perea RN  Outcome: Ongoing     Problem: Skin Integrity:  Goal: Skin integrity will stabilize  Description: Skin integrity will stabilize  2/19/2022 1757 by Maame Vines RN  Outcome: Ongoing  2/19/2022 1756 by Maame Vines RN  Outcome: Ongoing  2/19/2022 0720 by Myra Perea RN  Outcome: Ongoing     Problem: HEMODYNAMIC STATUS  Goal: Patient has stable vital signs and fluid balance  2/19/2022 1757 by Maame Vines RN  Outcome: Ongoing  2/19/2022 1756 by Maame Vines RN  Outcome: Ongoing  2/19/2022 0720 by Myra Perea RN  Outcome: Ongoing     Problem: ACTIVITY INTOLERANCE/IMPAIRED MOBILITY  Goal: Mobility/activity is maintained at optimum level for patient  2/19/2022 1757 by Katia Talavera RN  Outcome: Ongoing  2/19/2022 1756 by Katia Talavera RN  Outcome: Ongoing  2/19/2022 0720 by Alvin Kehr, RN  Outcome: Ongoing

## 2022-02-19 NOTE — PROCEDURES
PROCEDURE NOTE - CENTRAL VENOUS LINE PLACEMENT    PATIENT NAME: Silvio Burrell RECORD NO. [id number]  DATE: 2/19/2022  ATTENDING PHYSICIAN: Dr. Bro Ramos DIAGNOSIS:  Need for IV access  POSTOPERATIVE DIAGNOSIS:  Same  PROCEDURE PERFORMED:  Right Femoral Vein Central Line Insertion  PERFORMING PHYSICIAN: Kasandra Barr DO  ANESTHESIA:  Local utilizing 1% lidocaine  ESTIMATED BLOOD LOSS:  Less than 25 ml  COMPLICATIONS:  None immediately appreciated. DISCUSSION:  Kamala Padgett is a 80y.o.-year-old female who requires central IV access. The history and physical examination were reviewed and confirmed. The diagnoses, proposed procedure, risks, possible complications, benefits and alternatives were not able to be discussed with patient due to patient's baseline altered mental status secondary to dementia. Family was attempted to be contacted via phone 3 times with inability to contact. Deemed emergent by myself and RN secondary to lack of access. The patient was then prepared for the procedure. PROCEDURE:  A timeout was initiated by the bedside nurse and was confirmed by those present. The patient was placed in a supine position. The skin overlying the Right Femoral Vein was prepped with chlorhexadine and draped in sterile fashion. The skin was infiltrated with local anesthetic. The vessel and surrounding anatomy was visualized using ultrasound. Through the anesthetized region, the introducer needle was inserted into the femoral vein returning dark red non pulsatile blood. A guidewire was placed through the center of the needle with no resistance. Ultrasound confirmed presence of wire in the vein. A small incision made in the skin with a #11 scalpel blade. The dilator was inserted into the skin and vein over guidewire using Seldinger technique. The dilator was then removed and the 7 F 20 cm catheter was placed in the vein over the guidewire using Seldinger technique.  The guidewire was then removed and all ports aspirated and flushed appropriately. The catheter then secured using silk suture and a temporary sterile dressing was applied. No immediate complication was evident. All sponge, instrument and needle counts were correct at the completion of the procedure. The patient tolerated the procedure well with no immediate complication evident.      Mil Olivo DO  4:03 AM, 2/19/22     Electronically signed by Mil Olivo DO on 2/19/2022 at 4:05 AM

## 2022-02-19 NOTE — OP NOTE
Operative Note      Patient: Chandrakant Hogan  YOB: 1940  MRN: [id number]    Date of Procedure: 2/19/2022  Reason for surgery subdural hematoma  Pre-Op Diagnosis: sub dural hematoma    Post-Op Diagnosis: Same       Procedure(s):  GILLIAN HOLE FOR SUBDURAL HEMATOMA EVACUATION    Surgeon(s):  Samreen Troy MD    Assistant:   * No surgical staff found *    Anesthesia: General    Estimated Blood Loss (mL): less than 50     Complications: None    Specimens:   * No specimens in log *    Implants:  Subdural drain      Drains:   Open Drain Right Scalp  (Active)       Urethral Catheter Temperature probe 16 fr (Active)   $ Urethral catheter insertion Inserted for procedure 02/19/22 0520   Catheter Indications Perioperative use for selected surgical procedures 02/19/22 0520   Site Assessment No urethral drainage 02/19/22 0800   Urine Color Yellow 02/19/22 0800   Urine Appearance Clear 02/19/22 0800       Findings: Bur holes were initially placed approximately 3 cm off the midline. The first bur hole was made over the subdural.  A second bur hole slightly more lateral and frontal was made and thick membranes were identified. The thick membranes were coagulated incised and a gush of chronic subdural fluid was obtained. Copious amounts of antibiotic irrigation were used. A ventricular drain with extra holes cut in its distal extent was gently passed over the surface of the brain from the frontal to parietal direction. A second bur hole was made through widely but it also was not over the subdural.  I elected not to make a second pleural parietal bur hole. Given the prominent membranes present it is conceivable that a craniotomy may be necessary depending on the patient's response. All incisions were irrigated. The subdural drain was exited through a separate stab wound and secured with 2-0 silk stitch. It was connected to a collection system. The incisions were closed with 2-0 Vicryl and staples.   X-ray AP demonstrated the catheter present over the convexity. Detailed Description of Procedure:   Patient brought the operating room placed under general endotracheal intubation. Perioperative antibiotics were given. Letter will be stopped 24 hours postop. The right hemicranium was shaved and prepped in a sterile fashion. Incisions perpendicular to the midline were outlined and infiltrated with local anesthetic. A #15 blade was utilized to incise the skin and subcutaneous tissues. Bipolar cautery and Bovie were utilized for hemostasis. The Midas was utilized to place a frontal and parietal bur hole. The first frontal bur hole was clearly not over the subdural collection and a second more lateral bur hole was made and after opening multiple thick layers of membranes the subdural fluid was encountered. Ventricular drain was passed. Copious amounts of antibiotic irrigation were utilized. Incisions were closed with 2-0 Vicryl and staples.     Electronically signed by Chelsea Ulloa MD on 2/19/2022 at 11:55 AM

## 2022-02-19 NOTE — PROGRESS NOTES
Occupational [address]  Occupational Therapy Not Seen Note    DATE: 2022    NAME: Boni Seo  MRN: [id number]   : 1940      Patient not seen this date for Occupational Therapy due to:      Surgery/Procedure: Pt to OR this AM- joceline hole for hematoma evacuation      Next Scheduled Treatment: Will check back 2022 as able    Electronically signed by Maicol Paiz OT on 2022 at 9:29 AM

## 2022-02-19 NOTE — PLAN OF CARE
Problem: Falls - Risk of:  Goal: Will remain free from falls  Description: Will remain free from falls  Outcome: Ongoing  Goal: Absence of physical injury  Description: Absence of physical injury  Outcome: Ongoing     Problem: Infection:  Goal: Will remain free from infection  Description: Will remain free from infection  Outcome: Ongoing     Problem: Safety:  Goal: Free from accidental physical injury  Description: Free from accidental physical injury  Outcome: Ongoing  Goal: Free from intentional harm  Description: Free from intentional harm  Outcome: Ongoing     Problem: Daily Care:  Goal: Daily care needs are met  Description: Daily care needs are met  Outcome: Ongoing     Problem: Pain:  Goal: Patient's pain/discomfort is manageable  Description: Patient's pain/discomfort is manageable  Outcome: Ongoing     Problem: Daily Care:  Goal: Daily care needs are met  Description: Daily care needs are met  Outcome: Ongoing     Problem: Pain:  Goal: Patient's pain/discomfort is manageable  Description: Patient's pain/discomfort is manageable  Outcome: Ongoing     Problem: Skin Integrity:  Goal: Skin integrity will stabilize  Description: Skin integrity will stabilize  Outcome: Ongoing     Problem: Discharge Planning:  Goal: Patients continuum of care needs are met  Description: Patients continuum of care needs are met  Outcome: Ongoing     Problem: HEMODYNAMIC STATUS  Goal: Patient has stable vital signs and fluid balance  Outcome: Ongoing     Problem: ACTIVITY INTOLERANCE/IMPAIRED MOBILITY  Goal: Mobility/activity is maintained at optimum level for patient  Outcome: Ongoing     Problem: COMMUNICATION IMPAIRMENT  Goal: Ability to express needs and understand communication  Outcome: Ongoing

## 2022-02-19 NOTE — PROGRESS NOTES
Neurosurgery PRECIOUS/Resident    Daily Progress Note   Chief Complaint   Patient presents with    Altered Mental Status     2/19/2022  9:58 AM    Chart reviewed. No acute events overnight. No new complaints. Vitals:    02/19/22 0503 02/19/22 0600 02/19/22 0700 02/19/22 0800   BP: (!) 157/74  (!) 152/70 133/82   Pulse: 104  83 92   Resp: 23 18 19   Temp:    97.3 °F (36.3 °C)   TempSrc:       SpO2: 97% 96% 99% 98%          PE:   Alert, oriented to self  Follows all commands  Does no verbalize  E4 V1 M6   Motor   Left hemiparesis  R deltoid 5/5  R biceps 5/5  R triceps 5/5     R iliopsoas 5/5  R quadriceps 5/5  R dorsiflexion 5/5  R plantarflexion 5/5        Lab Results   Component Value Date    WBC 4.7 02/18/2022    HGB 13.7 02/18/2022    HCT 41.1 02/18/2022     02/18/2022    CHOL 137 10/19/2017    TRIG 56 10/19/2017    HDL 92 10/19/2017    ALT 12 02/17/2022    AST 21 02/17/2022     02/18/2022    K 3.8 02/18/2022     02/18/2022    CREATININE 0.58 02/18/2022    BUN 7 (L) 02/18/2022    CO2 20 02/18/2022    TSH 3.49 03/14/2018    INR 1.2 02/17/2022    LABA1C 5.9 10/19/2017    LABMICR 8 10/10/2016       Radiology   CT HEAD WO CONTRAST    Result Date: 2/19/2022  EXAMINATION: CT OF THE HEAD WITHOUT CONTRAST  2/19/2022 4:10 am TECHNIQUE: CT of the head was performed without the administration of intravenous contrast. Dose modulation, iterative reconstruction, and/or weight based adjustment of the mA/kV was utilized to reduce the radiation dose to as low as reasonably achievable. COMPARISON: Approximately 11 hours earlier. HISTORY: ORDERING SYSTEM PROVIDED HISTORY: f/u SDH TECHNOLOGIST PROVIDED HISTORY: f/u SDH FINDINGS: BRAIN/VENTRICLES: There has been no significant change in the mid to posterior right convexity subdural hematoma with hematocrit effect. It measures approximately 12 mm in thickness. There is unchanged minimal leftward midline shift.   There is mild diffuse cerebral atrophy and chronic white matter ischemic change. Old right basal ganglia lacunar infarcts again noted. The gray-white differentiation is maintained without evidence of an acute infarct. There is no evidence of hydrocephalus. ORBITS: The visualized portion of the orbits demonstrate no acute abnormality. SINUSES: The visualized paranasal sinuses and mastoid air cells demonstrate no acute abnormality. SOFT TISSUES/SKULL:  No acute abnormality of the visualized skull or soft tissues. Stable examination. CT head WO contrast    Result Date: 2/18/2022  EXAMINATION: CT OF THE HEAD WITHOUT CONTRAST  2/18/2022 4:59 am TECHNIQUE: CT of the head was performed without the administration of intravenous contrast. Dose modulation, iterative reconstruction, and/or weight based adjustment of the mA/kV was utilized to reduce the radiation dose to as low as reasonably achievable. COMPARISON: 02/17/2022 HISTORY: ORDERING SYSTEM PROVIDED HISTORY: SDH re-eval TECHNOLOGIST PROVIDED HISTORY: SDH re-eval Decision Support Exception - unselect if not a suspected or confirmed emergency medical condition->Emergency Medical Condition (MA) Reason for Exam: SDH re-eval FINDINGS: BRAIN/VENTRICLES: The previously identified right frontoparietal subdural hematoma is not significantly changed in appearance compared with the previous exam.  This measures 14 mm in thickness on coronal imaging and results in 4 mm of right to left midline shift, unchanged. There is no new hemorrhage or acute infarct identified. There are multiple lacunar infarcts involving the basal ganglia, right thalamus and ayo, unchanged. Diffuse cerebral volume loss is unchanged. There is no ventriculomegaly. ORBITS: Limited evaluation of the orbits is unremarkable. SINUSES: The paranasal sinuses and mastoid air cells are clear. SOFT TISSUES/SKULL:  No lytic or blastic osseous lesions are identified.      Redemonstration of a right frontoparietal subdural hematoma measuring 14 mm in thickness resulting in 4 mm of right to left midline shift, not significantly changed from the previous exam. No new hemorrhage or acute infarct identified. CT Head WO Contrast    Addendum Date: 2/17/2022    ADDENDUM: Case discussed with Dr. Marleny sykes at 10:24 p.m. Result Date: 2/17/2022  EXAMINATION: CT OF THE HEAD WITHOUT CONTRAST  2/17/2022 9:44 pm TECHNIQUE: CT of the head was performed without the administration of intravenous contrast. Dose modulation, iterative reconstruction, and/or weight based adjustment of the mA/kV was utilized to reduce the radiation dose to as low as reasonably achievable. COMPARISON: MR brain March 15, 2018 and CT head March 14, 2018 HISTORY: ORDERING SYSTEM PROVIDED HISTORY: ams TECHNOLOGIST PROVIDED HISTORY: ams Decision Support Exception - unselect if not a suspected or confirmed emergency medical condition->Emergency Medical Condition (MA) FINDINGS: BRAIN/VENTRICLES: Holo hemispheric extra-axial hemorrhage on the right measuring up to 15 mm in thickness with a dense hematocrit effect noted posteriorly. There is 4 mm right to left midline shift at the septum pellucidum. Ventricles cisterns and sulci are prominent. 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 Moderate nonspecific white matter disease. ORBITS: The visualized portion of the orbits demonstrate no acute abnormality. SINUSES: The visualized paranasal sinuses and mastoid air cells demonstrate no acute abnormality. SOFT TISSUES/SKULL: No acute abnormality of the visualized skull or soft tissues. Acute on chronic right subdural hematoma with midline shift as above The findings were sent to the Radiology Results [address] at 10:18 pm on 2/17/2022to be communicated to a licensed caregiver.        A/P  80 y.o. female who presents with right sided subdural hematoma    - NPO since midnight  - joceline hold for drainage of SDH today    Please contact neurosurgery with any changes in patients neurologic status.        Eran Randall, CNP  2/19/22  9:58 AM

## 2022-02-20 ENCOUNTER — APPOINTMENT (OUTPATIENT)
Dept: CT IMAGING | Age: 82
DRG: 025 | End: 2022-02-20
Payer: MEDICARE

## 2022-02-20 ENCOUNTER — APPOINTMENT (OUTPATIENT)
Dept: GENERAL RADIOLOGY | Age: 82
DRG: 025 | End: 2022-02-20
Payer: MEDICARE

## 2022-02-20 LAB
ABSOLUTE EOS #: <0.03 K/UL (ref 0–0.44)
ABSOLUTE IMMATURE GRANULOCYTE: 0.06 K/UL (ref 0–0.3)
ABSOLUTE LYMPH #: 0.99 K/UL (ref 1.1–3.7)
ABSOLUTE MONO #: 0.75 K/UL (ref 0.1–1.2)
ANION GAP SERPL CALCULATED.3IONS-SCNC: 15 MMOL/L (ref 9–17)
BASOPHILS # BLD: 0 % (ref 0–2)
BASOPHILS ABSOLUTE: <0.03 K/UL (ref 0–0.2)
BUN BLDV-MCNC: 8 MG/DL (ref 8–23)
BUN/CREAT BLD: ABNORMAL (ref 9–20)
CALCIUM SERPL-MCNC: 8.5 MG/DL (ref 8.6–10.4)
CHLORIDE BLD-SCNC: 105 MMOL/L (ref 98–107)
CO2: 19 MMOL/L (ref 20–31)
CREAT SERPL-MCNC: 0.78 MG/DL (ref 0.5–0.9)
CULTURE: ABNORMAL
DIFFERENTIAL TYPE: ABNORMAL
EOSINOPHILS RELATIVE PERCENT: 0 % (ref 1–4)
GFR AFRICAN AMERICAN: >60 ML/MIN
GFR NON-AFRICAN AMERICAN: >60 ML/MIN
GFR SERPL CREATININE-BSD FRML MDRD: ABNORMAL ML/MIN/{1.73_M2}
GFR SERPL CREATININE-BSD FRML MDRD: ABNORMAL ML/MIN/{1.73_M2}
GLUCOSE BLD-MCNC: 93 MG/DL (ref 70–99)
HCT VFR BLD CALC: 34.8 % (ref 36.3–47.1)
HEMOGLOBIN: 10.9 G/DL (ref 11.9–15.1)
IMMATURE GRANULOCYTES: 1 %
LYMPHOCYTES # BLD: 12 % (ref 24–43)
Lab: ABNORMAL
MCH RBC QN AUTO: 31 PG (ref 25.2–33.5)
MCHC RBC AUTO-ENTMCNC: 31.3 G/DL (ref 28.4–34.8)
MCV RBC AUTO: 98.9 FL (ref 82.6–102.9)
MONOCYTES # BLD: 9 % (ref 3–12)
NRBC AUTOMATED: 0 PER 100 WBC
PDW BLD-RTO: 16.1 % (ref 11.8–14.4)
PLATELET # BLD: 208 K/UL (ref 138–453)
PLATELET ESTIMATE: ABNORMAL
PMV BLD AUTO: 10.3 FL (ref 8.1–13.5)
POTASSIUM SERPL-SCNC: 3.7 MMOL/L (ref 3.7–5.3)
RBC # BLD: 3.52 M/UL (ref 3.95–5.11)
RBC # BLD: ABNORMAL 10*6/UL
SEG NEUTROPHILS: 78 % (ref 36–65)
SEGMENTED NEUTROPHILS ABSOLUTE COUNT: 6.46 K/UL (ref 1.5–8.1)
SODIUM BLD-SCNC: 139 MMOL/L (ref 135–144)
SPECIMEN DESCRIPTION: ABNORMAL
WBC # BLD: 8.3 K/UL (ref 3.5–11.3)
WBC # BLD: ABNORMAL 10*3/UL

## 2022-02-20 PROCEDURE — 6370000000 HC RX 637 (ALT 250 FOR IP): Performed by: REGISTERED NURSE

## 2022-02-20 PROCEDURE — 6360000002 HC RX W HCPCS: Performed by: REGISTERED NURSE

## 2022-02-20 PROCEDURE — 2580000003 HC RX 258: Performed by: STUDENT IN AN ORGANIZED HEALTH CARE EDUCATION/TRAINING PROGRAM

## 2022-02-20 PROCEDURE — 70450 CT HEAD/BRAIN W/O DYE: CPT

## 2022-02-20 PROCEDURE — 36415 COLL VENOUS BLD VENIPUNCTURE: CPT

## 2022-02-20 PROCEDURE — APPNB60 APP NON BILLABLE TIME 46-60 MINS: Performed by: REGISTERED NURSE

## 2022-02-20 PROCEDURE — 85025 COMPLETE CBC W/AUTO DIFF WBC: CPT

## 2022-02-20 PROCEDURE — 2700000000 HC OXYGEN THERAPY PER DAY

## 2022-02-20 PROCEDURE — 80048 BASIC METABOLIC PNL TOTAL CA: CPT

## 2022-02-20 PROCEDURE — 71045 X-RAY EXAM CHEST 1 VIEW: CPT

## 2022-02-20 PROCEDURE — 2000000003 HC NEURO ICU R&B

## 2022-02-20 PROCEDURE — 99233 SBSQ HOSP IP/OBS HIGH 50: CPT | Performed by: PSYCHIATRY & NEUROLOGY

## 2022-02-20 PROCEDURE — 94761 N-INVAS EAR/PLS OXIMETRY MLT: CPT

## 2022-02-20 PROCEDURE — 2580000003 HC RX 258: Performed by: REGISTERED NURSE

## 2022-02-20 RX ADMIN — SODIUM CHLORIDE: 9 INJECTION, SOLUTION INTRAVENOUS at 23:44

## 2022-02-20 RX ADMIN — HYDROCHLOROTHIAZIDE 12.5 MG: 25 TABLET ORAL at 08:14

## 2022-02-20 RX ADMIN — LEVETIRACETAM 500 MG: 5 INJECTION INTRAVENOUS at 03:34

## 2022-02-20 RX ADMIN — LOSARTAN POTASSIUM 50 MG: 50 TABLET, FILM COATED ORAL at 08:14

## 2022-02-20 RX ADMIN — SODIUM CHLORIDE, PRESERVATIVE FREE 10 ML: 5 INJECTION INTRAVENOUS at 08:14

## 2022-02-20 RX ADMIN — LEVETIRACETAM 500 MG: 5 INJECTION INTRAVENOUS at 16:43

## 2022-02-20 RX ADMIN — SODIUM CHLORIDE: 9 INJECTION, SOLUTION INTRAVENOUS at 08:16

## 2022-02-20 RX ADMIN — SODIUM CHLORIDE, PRESERVATIVE FREE 10 ML: 5 INJECTION INTRAVENOUS at 20:48

## 2022-02-20 RX ADMIN — CEFAZOLIN SODIUM 2000 MG: 10 INJECTION, POWDER, FOR SOLUTION INTRAVENOUS at 08:15

## 2022-02-20 RX ADMIN — DESMOPRESSIN ACETATE 40 MG: 0.2 TABLET ORAL at 08:14

## 2022-02-20 ASSESSMENT — PAIN SCALES - GENERAL: PAINLEVEL_OUTOF10: 0

## 2022-02-20 NOTE — PROGRESS NOTES
Occupational [address]  Occupational Therapy Not Seen Note    DATE: 2022    NAME: López Herrera  MRN: [id number]   : 1940      Patient not seen this date for Occupational Therapy due to:    Strict Bedrest:    Next Scheduled Treatment: Attempt on  as appropriate.     Electronically signed by Rodri Jenkins OT on 2022 at 9:55 AM

## 2022-02-20 NOTE — PROGRESS NOTES
Physical Therapy        Physical Therapy Cancel Note      DATE: 2022    NAME: Mauricio Diaz  MRN: [id number]   : 1940      Patient not seen this date for Physical Therapy due to:    Patient is not appropriate for PT evaluation/treatment at this time d/t SBR, PT to further attempt as able.        Electronically signed by Alicia Stiles PT on 2022 at 4:34 PM

## 2022-02-20 NOTE — ANESTHESIA POSTPROCEDURE EVALUATION
Department of Anesthesiology  Postprocedure Note    Patient: Yang Espinal  MRN: [id number]  YOB: 1940  Date of evaluation: 2/20/2022  Time:  7:21 AM     Procedure Summary     Date: 02/19/22 Room / Location: [address]    Anesthesia Start: 1017 Anesthesia Stop: 1200    Procedure: [address] (N/A ) Diagnosis: (hematoma)    Surgeons: Jt Ruelas MD Responsible Provider: Katiana Lawrence MD    Anesthesia Type: general ASA Status: 4          Anesthesia Type: general    Feliciano Phase I: Feliciano Score: 6    Feliciano Phase II:      Last vitals: Reviewed and per EMR flowsheets.        Anesthesia Post Evaluation    Patient location during evaluation: PACU  Patient participation: complete - patient participated  Level of consciousness: awake and alert  Airway patency: patent  Nausea & Vomiting: no nausea and no vomiting  Complications: no  Cardiovascular status: blood pressure returned to baseline  Respiratory status: acceptable  Hydration status: euvolemic

## 2022-02-20 NOTE — PROGRESS NOTES
Neurosurgery PRECIOUS/Resident    Daily Progress Note   Chief Complaint   Patient presents with    Altered Mental Status     2/20/2022  11:11 AM    Chart reviewed. No acute events overnight. No new complaints. Talking more today. Speaking in sentences. Vitals:    02/20/22 0645 02/20/22 0700 02/20/22 0715 02/20/22 0736   BP:  (!) 138/55     Pulse: 98 80 93    Resp: 16 15  20   Temp:       TempSrc:       SpO2: 97% 96% 100% 99%       PE:   Alert, oriented to self  Follows commands  E4 V4 M6  Pupils equal and reactive   Motor   Antigravity all 4 extremities     Drain output 25 ml  Incision subdural drain intact, cranial sites dry and intact      Lab Results   Component Value Date    WBC 8.3 02/20/2022    HGB 10.9 (L) 02/20/2022    HCT 34.8 (L) 02/20/2022     02/20/2022    CHOL 137 10/19/2017    TRIG 56 10/19/2017    HDL 92 10/19/2017    ALT 12 02/17/2022    AST 21 02/17/2022     02/20/2022    K 3.7 02/20/2022     02/20/2022    CREATININE 0.78 02/20/2022    BUN 8 02/20/2022    CO2 19 (L) 02/20/2022    TSH 3.49 03/14/2018    INR 1.2 02/17/2022    LABA1C 5.9 10/19/2017    LABMICR 8 10/10/2016       Radiology   CT Head wo Contrast    Result Date: 2/20/2022  EXAMINATION: CT OF THE HEAD WITHOUT CONTRAST  2/20/2022 5:05 am TECHNIQUE: CT of the head was performed without the administration of intravenous contrast. Dose modulation, iterative reconstruction, and/or weight based adjustment of the mA/kV was utilized to reduce the radiation dose to as low as reasonably achievable. COMPARISON: 02/19/2022, 02/18/2022 HISTORY: ORDERING SYSTEM PROVIDED HISTORY: f/u post op TECHNOLOGIST PROVIDED HISTORY: f/u post op Reason for Exam: f/u post op FINDINGS: BRAIN/VENTRICLES: Postoperative findings status post right subdural hematoma evacuation with drain in place. The amount of subdural hematoma has decreased in the interval measuring up to 11 mm in thickness with layering high attenuation as seen before.   A small amount of subarachnoid blood products are noted in the right sylvian fissure. Effacement of the right lateral ventricle has improved and there is no significant midline shift remaining. The basal cisterns are maintained. Cortical atrophy, chronic white matter changes and old lacunar infarcts in the right basal ganglia and thalamus again demonstrated. ORBITS: The visualized portion of the orbits demonstrate no acute abnormality. SINUSES: The visualized paranasal sinuses and mastoid air cells demonstrate no acute abnormality. SOFT TISSUES/SKULL:  Postoperative findings related to right frontal and parietal joceline hole placement. Right joceline hole and subdural drain placement for evacuation of right subdural hematoma. Interval reduction of hematoma and resolution of midline shift. New subarachnoid hemorrhage. CT HEAD WO CONTRAST    Result Date: 2/19/2022  EXAMINATION: CT OF THE HEAD WITHOUT CONTRAST  2/19/2022 4:10 am TECHNIQUE: CT of the head was performed without the administration of intravenous contrast. Dose modulation, iterative reconstruction, and/or weight based adjustment of the mA/kV was utilized to reduce the radiation dose to as low as reasonably achievable. COMPARISON: Approximately 11 hours earlier. HISTORY: ORDERING SYSTEM PROVIDED HISTORY: f/u SDH TECHNOLOGIST PROVIDED HISTORY: f/u SDH FINDINGS: BRAIN/VENTRICLES: There has been no significant change in the mid to posterior right convexity subdural hematoma with hematocrit effect. It measures approximately 12 mm in thickness. There is unchanged minimal leftward midline shift. There is mild diffuse cerebral atrophy and chronic white matter ischemic change. Old right basal ganglia lacunar infarcts again noted. The gray-white differentiation is maintained without evidence of an acute infarct. There is no evidence of hydrocephalus. ORBITS: The visualized portion of the orbits demonstrate no acute abnormality.  SINUSES: The visualized paranasal sinuses and mastoid air cells demonstrate no acute abnormality. SOFT TISSUES/SKULL:  No acute abnormality of the visualized skull or soft tissues. Stable examination. A/P  80 y.o. female who presents with right sided subdural hematoma  POD#1 s/p right joceline hold for subdural hematoma evacuation     - continue bedrest with HOB at 10 degrees, okay to have HOB at 20 degrees while eating   - keep subdural drain at 20 cmH2O below head of bed, contact neurosurgery if no drain output   - Seizure prophylaxis- continue Keppra 500mg q12hrs IV   - continue high flow O2     Please contact neurosurgery with any changes in patients neurologic status.        Aram Coronel CNP  2/20/22  11:11 AM

## 2022-02-20 NOTE — PLAN OF CARE
Problem: Falls - Risk of:  Goal: Will remain free from falls  Description: Will remain free from falls  Outcome: Ongoing  Goal: Absence of physical injury  Description: Absence of physical injury  Outcome: Ongoing     Problem: Infection:  Goal: Will remain free from infection  Description: Will remain free from infection  Outcome: Ongoing     Problem: Safety:  Goal: Free from accidental physical injury  Description: Free from accidental physical injury  Outcome: Ongoing  Goal: Free from intentional harm  Description: Free from intentional harm  Outcome: Ongoing     Problem: Daily Care:  Goal: Daily care needs are met  Description: Daily care needs are met  Outcome: Ongoing     Problem: Non-Violent Restraints  Goal: Removal from restraints as soon as assessed to be safe  Outcome: Ongoing  Goal: No harm/injury to patient while restraints in use  Outcome: Ongoing  Goal: Patient's dignity will be maintained  Outcome: Ongoing     Problem: Skin Integrity:  Goal: Will show no infection signs and symptoms  Description: Will show no infection signs and symptoms  Outcome: Ongoing  Goal: Absence of new skin breakdown  Description: Absence of new skin breakdown  Outcome: Ongoing

## 2022-02-20 NOTE — FLOWSHEET NOTE
Assessment: Patient is an 80 y.o. female who underwent a \"joceline hole procedure. \" Patient was sleeping at time of 's visit. Intervention:  visited patient per surgery list, as she underwent surgery earlier today. Per report, patient can be aroused and can follow commands, however, she is \"not oriented. \" Quoc Urbina learned that patient's decision maker is her guardian, Emily Robison. Per bedside nurse, no visitors have been at bedside throughout the night.  offered silent prayer at bedside for patient's rest and comfort. Outcome: Patient remained sleeping throughout encounter. Plan: Chaplains can make follow-up visit, per request. Juan Kitchen can be reached 24/7 via [employer].     Rea Vargas     02/20/22 0224   Encounter Summary   Services provided to: Patient not available   Referral/Consult From: Multi-disciplinary team  (Surgery List)   Support System   (Guardian)   Complexity of Encounter Low   Length of Encounter 15 minutes   Spiritual Assessment Completed Yes   Routine   Type Post-procedure   Spiritual/Mormonism   Type Spiritual support   Assessment Sleeping   Intervention Sustaining presence/ Ministry of presence   Outcome Did not respond

## 2022-02-20 NOTE — PROGRESS NOTES
Daily Progress Note  Neuro Critical Care    Patient Name: Herman Brennan  Patient : 1940  Room/Bed: 2140/2944-71  Code Status: Full  Allergies: No Known Allergies    CHIEF COMPLAINT:     AMS     INTERVAL HISTORY    Initial Presentation (Admitted 2022.):  The patient is a 80 y.o. 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 Last known well was yesterday at approximately 8 PM when patient was going to bed. Daughter states that she woke up today and she had \"a choking \"like episode and was exhibiting altered mental status. Patient does have a history of baseline and is normally not alert to place or time however daughter reports she is normally able to recognize family members and was unable to recognize her daughter today which made her concerned. She did call EMS, patient initially hypotensive via EMS however normotensive in the emergency department. Work-up was remarkable for acute on chronic right subdural hematoma measuring up to 15 mm in thickness with 4 mm of right to left shift. Patient is prescribed a daily aspirin however daughter states she does not regularly get this medication as daughter believes that she does not need it. No history of trauma recently however daughter does report that the patient fell onto her bottom approximately 1 month ago.        ICH score: 1     Did speak to daughter regarding [address] who wants to continue with CPR, at least one round but declines patient being intubated. She states that she have to think about it and is uncertain. At baseline, patient has dementia and neurosurgery wants a repeat imaging in the morning for possible intervention. No change in her neurological status. CT head has been stable.   : Central line placed yesterday, OR with NSg for Bur hole      Hospital Course:   Patient displaying signs of hemineglect with LUE but is responsive to pain in that extremity. Repeat head ct shows reduction of SDH but new small SAH. Patient pulled off HFNC and attempted to remove subdural drain requiring restraints.         CURRENT MEDICATIONS:  SCHEDULED MEDICATIONS:   levetiracetam  500 mg IntraVENous Q12H    atorvastatin  40 mg Oral Daily    sodium chloride flush  5-40 mL IntraVENous 2 times per day    losartan  50 mg Oral Daily    And    hydroCHLOROthiazide  12.5 mg Oral Daily    metFORMIN  1,000 mg Oral BID WC    And    alogliptin  12.5 mg Oral Daily     CONTINUOUS INFUSIONS:   sodium chloride      sodium chloride 100 mL/hr at 22 0847     PRN MEDICATIONS:   sodium chloride flush, sodium chloride, ondansetron **OR** ondansetron, polyethylene glycol, acetaminophen    VITALS:  Temperature Range: Temp: 97.7 °F (36.5 °C) Temp  Av.7 °F (35.9 °C)  Min: 96.5 °F (35.8 °C)  Max: 99.1 °F (37.3 °C)  BP Range: Systolic (96FAS), JIC:883 , Min:48 , NSU:914     Diastolic (59GBS), IEB:26, Min:18, Max:116    Pulse Range: Pulse  Av.3  Min: 72  Max: 107  Respiration Range: Resp  Av.2  Min: 0  Max: 34  Current Pulse Ox: SpO2: 99 %  24HR Pulse Ox Range: SpO2  Av.5 %  Min: 94 %  Max: 100 %  Patient Vitals for the past 12 hrs:   BP Temp Temp src Pulse Resp SpO2   22 0736 -- -- -- -- 20 99 %   22 0715 -- -- -- 93 -- 100 %   22 0700 (!) 138/55 -- -- 80 15 96 %   22 0645 -- -- -- 98 16 97 %   22 0630 -- -- -- 85 17 96 %   22 0615 -- -- -- 88 12 100 %   22 0600 (!) 141/61 -- -- 86 17 100 %   22 0545 -- -- -- 84 15 99 %   22 0530 130/60 -- -- 84 14 100 %   22 0445 -- -- -- 90 20 100 %   22 0430 -- -- -- 80 16 96 %   22 0415 -- -- -- 94 18 99 %   22 0400 (!) 137/58 97.7 °F (36.5 °C) Axillary 85 19 97 %   22 0345 -- -- -- 81 14 100 %   22 0330 -- -- -- 89 13 100 % 02/20/22 0315 -- -- -- 92 23 99 %   02/20/22 0300 (!) 135/116 -- -- 90 13 99 %   02/20/22 0245 -- -- -- 94 14 99 %   02/20/22 0230 -- -- -- 90 17 98 %   02/20/22 0215 -- -- -- 95 15 98 %   02/20/22 0200 (!) 100/41 -- -- 82 17 96 %   02/20/22 0145 -- -- -- 79 15 97 %   02/20/22 0130 -- -- -- 92 13 100 %   02/20/22 0115 -- -- -- 87 19 97 %   02/20/22 0100 121/70 -- -- 95 14 100 %   02/20/22 0045 -- -- -- 86 19 97 %   02/20/22 0030 -- -- -- 88 19 99 %   02/20/22 0015 -- -- -- 86 17 100 %   02/20/22 0000 136/62 -- -- 91 19 97 %   02/19/22 2349 -- -- -- -- 19 100 %   02/19/22 2345 -- -- -- 89 15 100 %   02/19/22 2330 -- -- -- 84 (!) 33 99 %   02/19/22 2315 -- -- -- 82 26 100 %   02/19/22 2300 120/61 -- -- 91 13 100 %   02/19/22 2245 -- -- -- 83 (!) 33 99 %   02/19/22 2230 -- -- -- 90 14 100 %   02/19/22 2215 -- -- -- 80 17 100 %     Estimated body mass index is 28.39 kg/m² as calculated from the following:    Height as of 3/23/18: 5' 2\" (1.575 m).     Weight as of 4/11/18: 155 lb 3.2 oz (70.4 kg).  []<16 Severe malnutrition  []16-16.99 Moderate malnutrition  []17-18.49 Mild malnutrition  []18.5-24.9 Normal  [x]25-29.9 Overweight (not obese)  []30-34.9 Obese class 1 (Low Risk)  []35-39.9 Obese class 2 (Moderate Risk)  []?40 Obese class 3 (High Risk)    RECENT LABS:   Lab Results   Component Value Date    WBC 8.3 02/20/2022    HGB 10.9 (L) 02/20/2022    HCT 34.8 (L) 02/20/2022     02/20/2022    CHOL 137 10/19/2017    TRIG 56 10/19/2017    HDL 92 10/19/2017    ALT 12 02/17/2022    AST 21 02/17/2022     02/20/2022    K 3.7 02/20/2022     02/20/2022    CREATININE 0.78 02/20/2022    BUN 8 02/20/2022    CO2 19 (L) 02/20/2022    TSH 3.49 03/14/2018    INR 1.2 02/17/2022    LABA1C 5.9 10/19/2017    LABMICR 8 10/10/2016     24 HOUR INTAKE/OUTPUT:    Intake/Output Summary (Last 24 hours) at 2/20/2022 1006  Last data filed at 2/20/2022 0847  Gross per 24 hour   Intake 2259.36 ml   Output 770 ml   Net 1489.36 ml IMAGING:       Labs and Images reviewed with:  [] Dr. Igor Ortega. Amee    [] Dr. Krista Antonio  [x] Dr. Clara Chen  [] There are no new interval images to review. PHYSICAL EXAM       CONSTITUTIONAL:  Well developed, well nourished, oriented to self, disoriented to place and time, follows commands. In no acute distress. GCS 14.  Speech. No dysarthria or aphasia. HEAD:  normocephalic, atraumatic    EYES:  PERRLA, EOMI. no gaze deviation. ENT:  moist mucous membranes   NECK:  supple, symmetric   LUNGS:  Equal air entry bilaterally   CARDIOVASCULAR:  normal s1 / s2, RRR, distal pulses intact   ABDOMEN:  Soft, no rigidity   NEUROLOGIC:  Mental Status:  Not oriented to date, Not oriented to person and Not oriented to place,awake             Cranial Nerves:    II: Visual acuity:  normal  II: Visual fields:  normal  III: Pupils:  equal, round, reactive to light  III,IV,VI: Extra Ocular Movements: intact  V: Facial sensation:  intact  VII: Facial strength: Minimal droop left face  VIII: Hearing:  intact  IX: Palate:  intact  XI: Shoulder shrug:  intact  XII: Tongue movement:  normal     Motor Exam:    Drift: Slight drift present left upper extremity, however able to sustain antigravity  Tone:  normal     MOTOR:  RUE: 5/5  LUE: 3/5  RLE: 5/5  LLE: 4/5     Sensory:    Touch:    Right Upper Extremity:  normal  Left Upper Extremity:  normal  Right Lower Extremity:  normal  Left Lower Extremity:  normal     Clonus:  absent     Coordination/Dysmetria:  Heel to Shin:  Normal  Finger to Nose:   Normal     TOTAL:     DRAINS:  [x] There are no drains for Neuro Critical Care to monitor at this time.   -Drain per NSG    ASSESSMENT AND PLAN:          ASSESSMENT:      This is a 80 y.o. 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   She does have a history of dementia however daughter was concerned as patient was acting different from her baseline today. CT in the emergency department revealed subdural hematoma with shift. Neurosurgery and neuro critical care consulted out of the emergency department.       PLAN/MEDICAL DECISION MAKING:     NEUROLOGIC:  - Imaging CT head demonstrates acute on chronic right subdural hematoma with 4 mm and shift  -Repeat CT head stable  -N.p.o. after midnight  -OR today.  -Neurosurgery on board, managing drain  -SBP goal less than 160  -Seizure prophylaxis keppra 500 bid     CARDIOVASCULAR:  - Goal SBP less than 160  -Continue on telemetry as directed     PULMONARY:  -Monitor for any change in the patient's respiratory status     RENAL/FLUID/ELECTROLYTE:  8/0.78  Monitor UOP       GI/NUTRITION:  NUTRITION:  -ate pudding and applesauce today  - Bowel regimen: Zofran, GlycoLax  - GI prophylaxis: None       ID:  -Monitor for hypo and hyperthermia. -Tylenol as needed if T-max greater than 101.  -Panculture if febrile.       HEME:   -hgb 10.9  Platelets 414     ENDOCRINE:  - Continue to monitor blood glucose, goal <180       OTHER:  - PT/OT/ST   - Code Status: Prior     PROPHYLAXIS:  Stress ulcer: Not indicated     DVT PROPHYLAXIS:  - SCD sleeves - Thigh High     We will continue to follow along. For any changes in exam or patient status please contact Neuro Critical Care.       Giovana Eric,   Neuro Critical Care  Pager [phone]  2/20/2022     10:06 AM

## 2022-02-21 LAB
-: NORMAL
ABSOLUTE EOS #: 0.23 K/UL (ref 0–0.44)
ABSOLUTE IMMATURE GRANULOCYTE: 0.07 K/UL (ref 0–0.3)
ABSOLUTE LYMPH #: 2.33 K/UL (ref 1.1–3.7)
ABSOLUTE MONO #: 0.87 K/UL (ref 0.1–1.2)
ANION GAP SERPL CALCULATED.3IONS-SCNC: 11 MMOL/L (ref 9–17)
BASOPHILS # BLD: 0 % (ref 0–2)
BASOPHILS ABSOLUTE: 0.03 K/UL (ref 0–0.2)
BUN BLDV-MCNC: 5 MG/DL (ref 8–23)
BUN/CREAT BLD: ABNORMAL (ref 9–20)
CALCIUM SERPL-MCNC: 8.5 MG/DL (ref 8.6–10.4)
CHLORIDE BLD-SCNC: 110 MMOL/L (ref 98–107)
CO2: 20 MMOL/L (ref 20–31)
CREAT SERPL-MCNC: 0.53 MG/DL (ref 0.5–0.9)
DIFFERENTIAL TYPE: ABNORMAL
EOSINOPHILS RELATIVE PERCENT: 3 % (ref 1–4)
GFR AFRICAN AMERICAN: >60 ML/MIN
GFR NON-AFRICAN AMERICAN: >60 ML/MIN
GFR SERPL CREATININE-BSD FRML MDRD: ABNORMAL ML/MIN/{1.73_M2}
GFR SERPL CREATININE-BSD FRML MDRD: ABNORMAL ML/MIN/{1.73_M2}
GLUCOSE BLD-MCNC: 84 MG/DL (ref 70–99)
HCT VFR BLD CALC: 32.6 % (ref 36.3–47.1)
HEMOGLOBIN: 11.1 G/DL (ref 11.9–15.1)
IMMATURE GRANULOCYTES: 1 %
LYMPHOCYTES # BLD: 31 % (ref 24–43)
MCH RBC QN AUTO: 31.8 PG (ref 25.2–33.5)
MCHC RBC AUTO-ENTMCNC: 34 G/DL (ref 28.4–34.8)
MCV RBC AUTO: 93.4 FL (ref 82.6–102.9)
MONOCYTES # BLD: 12 % (ref 3–12)
NRBC AUTOMATED: 0 PER 100 WBC
PDW BLD-RTO: 15.9 % (ref 11.8–14.4)
PLATELET # BLD: 157 K/UL (ref 138–453)
PLATELET ESTIMATE: ABNORMAL
PMV BLD AUTO: 11.3 FL (ref 8.1–13.5)
POTASSIUM SERPL-SCNC: 3.7 MMOL/L (ref 3.7–5.3)
RBC # BLD: 3.49 M/UL (ref 3.95–5.11)
RBC # BLD: ABNORMAL 10*6/UL
REASON FOR REJECTION: NORMAL
SEG NEUTROPHILS: 53 % (ref 36–65)
SEGMENTED NEUTROPHILS ABSOLUTE COUNT: 4.02 K/UL (ref 1.5–8.1)
SODIUM BLD-SCNC: 141 MMOL/L (ref 135–144)
WBC # BLD: 7.6 K/UL (ref 3.5–11.3)
WBC # BLD: ABNORMAL 10*3/UL
ZZ NTE CLEAN UP: ORDERED TEST: NORMAL
ZZ NTE WITH NAME CLEAN UP: SPECIMEN SOURCE: NORMAL

## 2022-02-21 PROCEDURE — 2000000003 HC NEURO ICU R&B

## 2022-02-21 PROCEDURE — 2580000003 HC RX 258: Performed by: STUDENT IN AN ORGANIZED HEALTH CARE EDUCATION/TRAINING PROGRAM

## 2022-02-21 PROCEDURE — 85025 COMPLETE CBC W/AUTO DIFF WBC: CPT

## 2022-02-21 PROCEDURE — APPSS30 APP SPLIT SHARED TIME 16-30 MINUTES: Performed by: PHYSICIAN ASSISTANT

## 2022-02-21 PROCEDURE — 80048 BASIC METABOLIC PNL TOTAL CA: CPT

## 2022-02-21 PROCEDURE — 99232 SBSQ HOSP IP/OBS MODERATE 35: CPT | Performed by: PSYCHIATRY & NEUROLOGY

## 2022-02-21 PROCEDURE — 94761 N-INVAS EAR/PLS OXIMETRY MLT: CPT

## 2022-02-21 PROCEDURE — 6360000002 HC RX W HCPCS: Performed by: STUDENT IN AN ORGANIZED HEALTH CARE EDUCATION/TRAINING PROGRAM

## 2022-02-21 PROCEDURE — 76937 US GUIDE VASCULAR ACCESS: CPT

## 2022-02-21 PROCEDURE — 6370000000 HC RX 637 (ALT 250 FOR IP): Performed by: REGISTERED NURSE

## 2022-02-21 PROCEDURE — 2580000003 HC RX 258: Performed by: REGISTERED NURSE

## 2022-02-21 PROCEDURE — 36415 COLL VENOUS BLD VENIPUNCTURE: CPT

## 2022-02-21 PROCEDURE — 2700000000 HC OXYGEN THERAPY PER DAY

## 2022-02-21 PROCEDURE — 6360000002 HC RX W HCPCS: Performed by: REGISTERED NURSE

## 2022-02-21 RX ORDER — HYDRALAZINE HYDROCHLORIDE 20 MG/ML
10 INJECTION INTRAMUSCULAR; INTRAVENOUS ONCE
Status: COMPLETED | OUTPATIENT
Start: 2022-02-21 | End: 2022-02-21

## 2022-02-21 RX ORDER — HYDRALAZINE HYDROCHLORIDE 20 MG/ML
5 INJECTION INTRAMUSCULAR; INTRAVENOUS ONCE
Status: COMPLETED | OUTPATIENT
Start: 2022-02-21 | End: 2022-02-21

## 2022-02-21 RX ADMIN — LEVETIRACETAM 500 MG: 5 INJECTION INTRAVENOUS at 17:37

## 2022-02-21 RX ADMIN — METFORMIN HYDROCHLORIDE 1000 MG: 500 TABLET ORAL at 17:34

## 2022-02-21 RX ADMIN — HYDRALAZINE HYDROCHLORIDE 10 MG: 20 INJECTION INTRAMUSCULAR; INTRAVENOUS at 17:55

## 2022-02-21 RX ADMIN — HYDRALAZINE HYDROCHLORIDE 5 MG: 20 INJECTION INTRAMUSCULAR; INTRAVENOUS at 10:53

## 2022-02-21 RX ADMIN — LEVETIRACETAM 500 MG: 5 INJECTION INTRAVENOUS at 03:48

## 2022-02-21 RX ADMIN — ALOGLIPTIN 12.5 MG: 12.5 TABLET, FILM COATED ORAL at 08:16

## 2022-02-21 RX ADMIN — HYDROCHLOROTHIAZIDE 12.5 MG: 25 TABLET ORAL at 08:15

## 2022-02-21 RX ADMIN — SODIUM CHLORIDE, PRESERVATIVE FREE 10 ML: 5 INJECTION INTRAVENOUS at 08:21

## 2022-02-21 RX ADMIN — DESMOPRESSIN ACETATE 40 MG: 0.2 TABLET ORAL at 08:16

## 2022-02-21 RX ADMIN — LOSARTAN POTASSIUM 50 MG: 50 TABLET, FILM COATED ORAL at 08:16

## 2022-02-21 RX ADMIN — METFORMIN HYDROCHLORIDE 1000 MG: 500 TABLET ORAL at 08:17

## 2022-02-21 RX ADMIN — DESMOPRESSIN ACETATE 40 MG: 0.2 TABLET ORAL at 08:18

## 2022-02-21 RX ADMIN — SODIUM CHLORIDE: 9 INJECTION, SOLUTION INTRAVENOUS at 19:51

## 2022-02-21 ASSESSMENT — PAIN SCALES - GENERAL
PAINLEVEL_OUTOF10: 0

## 2022-02-21 NOTE — PROGRESS NOTES
Daily Progress Note  Neuro Critical Care    Patient Name: Jose L Barillas  Patient : 1940  Room/Bed: 1973/7007-47  Code Status: Full  Allergies: No Known Allergies    CHIEF COMPLAINT:     AMS     INTERVAL HISTORY    Initial Presentation (Admitted 2022.):  The patient is a 80 y.o. 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 Last known well was yesterday at approximately 8 PM when patient was going to bed. Daughter states that she woke up today and she had \"a choking \"like episode and was exhibiting altered mental status. Patient does have a history of baseline and is normally not alert to place or time however daughter reports she is normally able to recognize family members and was unable to recognize her daughter today which made her concerned. She did call EMS, patient initially hypotensive via EMS however normotensive in the emergency department. Work-up was remarkable for acute on chronic right subdural hematoma measuring up to 15 mm in thickness with 4 mm of right to left shift. Patient is prescribed a daily aspirin however daughter states she does not regularly get this medication as daughter believes that she does not need it. No history of trauma recently however daughter does report that the patient fell onto her bottom approximately 1 month ago.     ICH score: 1    Hospital Course:     Did speak to daughter regarding [address] who wants to continue with CPR, at least one round but declines patient being intubated. She states that she have to think about it and is uncertain. At baseline, patient has dementia and neurosurgery wants a repeat imaging in the morning for possible intervention. No change in her neurological status. CT head has been stable.   : Central line placed yesterday, OR with NSg for Bur hole. : Patient displaying signs of hemineglect with LUE but is responsive to pain in that extremity. Repeat head ct shows reduction of SDH but new small SAH. Patient pulled off HFNC and attempted to remove subdural drain requiring restraints. Last 24 hours:   No acute events overnight. Patient remained hemodynamically stable. Drain output around 8 cc/h overnight. Possible drain removal by neurosurgery today. Patient's exam remained stable, continues to be disoriented, oriented to self.        CURRENT MEDICATIONS:  SCHEDULED MEDICATIONS:   levetiracetam  500 mg IntraVENous Q12H    atorvastatin  40 mg Oral Daily    sodium chloride flush  5-40 mL IntraVENous 2 times per day    losartan  50 mg Oral Daily    And    hydroCHLOROthiazide  12.5 mg Oral Daily    metFORMIN  1,000 mg Oral BID WC    And    alogliptin  12.5 mg Oral Daily     CONTINUOUS INFUSIONS:   sodium chloride      sodium chloride 50 mL/hr at 22 0600     PRN MEDICATIONS:   sodium chloride flush, sodium chloride, ondansetron **OR** ondansetron, polyethylene glycol, acetaminophen    VITALS:  Temperature Range: Temp: 97.4 °F (36.3 °C) Temp  Av.5 °F (36.9 °C)  Min: 97.4 °F (36.3 °C)  Max: 99.3 °F (37.4 °C)  BP Range: Systolic (86VGD), EUD:274 , Min:119 , TPP:273     Diastolic (96TIQ), MP, Min:38, Max:107    Pulse Range: Pulse  Av.1  Min: 58  Max: 93  Respiration Range: Resp  Av.2  Min: 12  Max: 21  Current Pulse Ox: SpO2: 100 %  24HR Pulse Ox Range: SpO2  Av.9 %  Min: 95 %  Max: 100 %  Patient Vitals for the past 12 hrs:   BP Temp Temp src Pulse Resp SpO2   22 0600 (!) 157/65 97.4 °F (36.3 °C) Axillary 74 17 100 %   22 0500 (!) 119/38 -- -- 67 16 95 %   22 0441 -- -- -- -- 13 96 %   22 0400 (!) 153/98 98 °F (36.7 °C) Axillary 78 20 100 %   22 0300 (!) 142/62 -- -- 60 14 100 %   22 0200 (!) 152/66 98.2 °F (36.8 °C) Axillary 64 14 100 %   22 0100 122/87 -- -- 76 15 100 %   02/21/22 0000 (!) 136/107 98.6 °F (37 °C) Oral 61 17 100 %   02/20/22 2312 -- -- -- -- 15 100 %   02/20/22 2300 (!) 123/43 -- -- 73 16 100 %   02/20/22 2200 (!) 151/65 98.8 °F (37.1 °C) Oral 58 15 100 %   02/20/22 2100 (!) 135/53 -- -- 75 16 100 %   02/20/22 2000 132/73 98.8 °F (37.1 °C) Oral 77 12 100 %     Estimated body mass index is 28.39 kg/m² as calculated from the following:    Height as of 3/23/18: 5' 2\" (1.575 m). Weight as of 4/11/18: 155 lb 3.2 oz (70.4 kg).  []<16 Severe malnutrition  []16-16.99 Moderate malnutrition  []17-18.49 Mild malnutrition  []18.5-24.9 Normal  [x]25-29.9 Overweight (not obese)  []30-34.9 Obese class 1 (Low Risk)  []35-39.9 Obese class 2 (Moderate Risk)  []?40 Obese class 3 (High Risk)    RECENT LABS:   Lab Results   Component Value Date    WBC 7.6 02/21/2022    HGB 11.1 (L) 02/21/2022    HCT 32.6 (L) 02/21/2022     02/21/2022    CHOL 137 10/19/2017    TRIG 56 10/19/2017    HDL 92 10/19/2017    ALT 12 02/17/2022    AST 21 02/17/2022     02/21/2022    K 3.7 02/21/2022     (H) 02/21/2022    CREATININE 0.53 02/21/2022    BUN 5 (L) 02/21/2022    CO2 20 02/21/2022    TSH 3.49 03/14/2018    INR 1.2 02/17/2022    LABA1C 5.9 10/19/2017    LABMICR 8 10/10/2016     24 HOUR INTAKE/OUTPUT:    Intake/Output Summary (Last 24 hours) at 2/21/2022 0707  Last data filed at 2/21/2022 0600  Gross per 24 hour   Intake 2259.86 ml   Output 1555 ml   Net 704.86 ml       IMAGING:       Labs and Images reviewed with:  [] Dr. Tash Larry. Amee    [] Dr. Raji Duff  [x] Dr. Karen Escalante  [] There are no new interval images to review. PHYSICAL EXAM       CONSTITUTIONAL:  Well developed, well nourished, oriented to self, disoriented to place and time, follows commands. In no acute distress. GCS 14.  Speech. No dysarthria or aphasia. HEAD:  normocephalic, atraumatic    EYES:  PERRLA, EOMI. no gaze deviation.    ENT:  moist mucous membranes   NECK:  supple, symmetric LUNGS:  Equal air entry bilaterally   CARDIOVASCULAR:  normal s1 / s2, RRR, distal pulses intact   ABDOMEN:  Soft, no rigidity   NEUROLOGIC:  Mental Status:  Not oriented to date, Not oriented to place, oriented to self, awake             Cranial Nerves:    II: Visual acuity:  normal  II: Visual fields:  normal  III: Pupils:  equal, round, reactive to light  III,IV,VI: Extra Ocular Movements: intact  V: Facial sensation:  intact  VII: Facial strength: Minimal droop left face  VIII: Hearing:  intact  IX: Palate:  intact  XI: Shoulder shrug:  intact  XII: Tongue movement:  normal     Motor Exam:    Drift: Slight drift present left upper extremity, however able to sustain antigravity  Tone:  normal     MOTOR:  RUE: 5/5  LUE: 3/5  RLE: 5/5  LLE: 4/5     Sensory:    Touch:    Right Upper Extremity:  normal  Left Upper Extremity:  normal  Right Lower Extremity:  normal  Left Lower Extremity:  normal     Clonus:  absent     Coordination/Dysmetria:  Heel to Shin:  Normal  Finger to Nose:   Normal     TOTAL:     DRAINS:  [x] There are no drains for Neuro Critical Care to monitor at this time.   -Drain per NSG    ASSESSMENT AND PLAN:          ASSESSMENT:      This is a 80 y.o. 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 She does have a history of dementia however daughter was concerned as patient was acting different from her baseline today. CT in the emergency department revealed subdural hematoma with shift.   Neurosurgery and neuro critical care consulted out of the emergency department.       PLAN/MEDICAL DECISION MAKING:     NEUROLOGIC:  -Imaging CT head demonstrates acute on chronic right subdural hematoma with 4 mm and shift  -Repeat CT head stable  -Underwent bur holes with neurosurgery on 2/19/2022  -Neurosurgery on board, managing subdural drain  -SBP goal less than 160  -Seizure prophylaxis keppra 500 bid     CARDIOVASCULAR:  -Goal SBP less than 160  -Continue on telemetry as directed     PULMONARY:  -Monitor for any change in the patient's respiratory status     RENAL/FLUID/ELECTROLYTE:  BUN/creatinine 8/0.78  Monitor UOP    GI/NUTRITION:  NUTRITION:  -On regular diet  - Bowel regimen: Zofran, GlycoLax  - GI prophylaxis: None     ID:  -Afebrile  -WBC 7.6, stable  -Tylenol as needed if T-max greater than 101.  -Panculture if febrile. HEME:   -H&H 11.1/32.6  -Platelets 177  -CBC daily     ENDOCRINE:  - Continue to monitor blood glucose, goal <180    OTHER:  - PT/OT/ST   - Code Status:  Full code     PROPHYLAXIS:  Stress ulcer: Not indicated     DVT PROPHYLAXIS:  - SCD sleeves - Thigh High       -Disposition: Okay for transfer to stepdown after drain removal today. We will continue to follow along. For any changes in exam or patient status please contact Neuro Critical Care.       Pauly Dickerson MD  Neuro Critical Care  Pager [phone]  2/21/2022     7:07 AM

## 2022-02-21 NOTE — PROGRESS NOTES
Neurosurgery PRECIOUS/Resident    Daily Progress Note   Chief Complaint   Patient presents with    Altered Mental Status     2/21/2022  4:28 PM    Chart reviewed. No acute events overnight. No new complaints. Vitals:    02/21/22 1200 02/21/22 1225 02/21/22 1300 02/21/22 1400   BP: (!) 157/71  (!) 151/73 (!) 156/72   Pulse: 67  82 82   Resp: 16 15 17 17   Temp: 97.6 °F (36.4 °C)      TempSrc: Oral      SpO2: 100% 100% 100% 100%         PE:   E4 V4 M6  Awake, alert, NAD  Follows commands throughout BUE and BLE    Drain output 84ml/12hr  Incision c/d/i      Lab Results   Component Value Date    WBC 7.6 02/21/2022    HGB 11.1 (L) 02/21/2022    HCT 32.6 (L) 02/21/2022     02/21/2022    CHOL 137 10/19/2017    TRIG 56 10/19/2017    HDL 92 10/19/2017    ALT 12 02/17/2022    AST 21 02/17/2022     02/21/2022    K 3.7 02/21/2022     (H) 02/21/2022    CREATININE 0.53 02/21/2022    BUN 5 (L) 02/21/2022    CO2 20 02/21/2022    TSH 3.49 03/14/2018    INR 1.2 02/17/2022    LABA1C 5.9 10/19/2017    LABMICR 8 10/10/2016       A/P  80 y.o. female who presents with right sided subdural hematoma  POD 2 s/p right joceline hold for subdural hematoma evacuation                 - continue bedrest with HOB at 10 degrees, okay to have HOB at 20 degrees while eating              - keep subdural drain at 20 cmH2O below head of bed, contact neurosurgery if no drain output              - Seizure prophylaxis- continue Keppra 500mg q12hrs IV   - continue high flow O2   - obtain CTH in am     Please contact neurosurgery with any changes in patients neurologic status.        Mine López PA-C  2/21/22  4:28 PM

## 2022-02-21 NOTE — CARE COORDINATION
[facility] Flow/Interdisciplinary Rounds Progress Note    Quality Flow Rounds held on February 21, 2022 at [address] [facility] Patricia Attending:  Bedside nurse, , unit leadership    Barriers to Discharge: Bedrest, subdural drain    Anticipated Discharge Disposition: Probable rehab    Readmission Risk              Risk of Unplanned Readmission:  13           Discussed patient goal for the day, patient clinical progression, and barriers to discharge. The following Goal(s) of the Day/Commitment(s) have been identified:  PT/OT when able to participate, will need rehab choices.     Az Dunaway RN  February 21, 2022

## 2022-02-21 NOTE — PLAN OF CARE
Problem: Falls - Risk of:  Goal: Will remain free from falls  Description: Will remain free from falls  2/20/2022 1941 by Charu Yu RN  Outcome: Ongoing  2/20/2022 1639 by Loyda Villa RN  Outcome: Ongoing  Goal: Absence of physical injury  Description: Absence of physical injury  2/20/2022 1941 by Charu Yu RN  Outcome: Ongoing  2/20/2022 1639 by Loyda Villa RN  Outcome: Ongoing     Problem: Infection:  Goal: Will remain free from infection  Description: Will remain free from infection  2/20/2022 1941 by Charu Yu RN  Outcome: Ongoing  2/20/2022 1639 by Loyda Villa RN  Outcome: Ongoing     Problem: Safety:  Goal: Free from accidental physical injury  Description: Free from accidental physical injury  2/20/2022 1941 by Charu Yu RN  Outcome: Ongoing  2/20/2022 1639 by Loyda Villa RN  Outcome: Ongoing  Goal: Free from intentional harm  Description: Free from intentional harm  2/20/2022 1941 by Charu Yu RN  Outcome: Ongoing  2/20/2022 1639 by Loyda Villa RN  Outcome: Ongoing     Problem: Daily Care:  Goal: Daily care needs are met  Description: Daily care needs are met  2/20/2022 1941 by Charu Yu RN  Outcome: Ongoing  2/20/2022 1639 by Loyda Villa RN  Outcome: Ongoing     Problem: Pain:  Goal: Patient's pain/discomfort is manageable  Description: Patient's pain/discomfort is manageable  2/20/2022 1941 by Charu Yu RN  Outcome: Ongoing  2/20/2022 1639 by Loyda Villa RN  Outcome: Ongoing     Problem: Skin Integrity:  Goal: Skin integrity will stabilize  Description: Skin integrity will stabilize  2/20/2022 1941 by Charu Yu RN  Outcome: Ongoing  2/20/2022 1639 by Loyda Villa RN  Outcome: Ongoing     Problem: Discharge Planning:  Goal: Patients continuum of care needs are met  Description: Patients continuum of care needs are met  2/20/2022 1941 by Charu Yu RN  Outcome: Ongoing  2/20/2022 1639 by Loyda Villa RN  Outcome: Ongoing     Problem: HEMODYNAMIC STATUS  Goal: Patient has stable vital signs and fluid balance  2/20/2022 1941 by Dunia Jacobs RN  Outcome: Ongoing  2/20/2022 1639 by Yamilet Coates RN  Outcome: Ongoing     Problem: ACTIVITY INTOLERANCE/IMPAIRED MOBILITY  Goal: Mobility/activity is maintained at optimum level for patient  2/20/2022 1941 by Dunia Jacobs RN  Outcome: Ongoing  2/20/2022 1639 by Yamilet Coates RN  Outcome: Ongoing     Problem: COMMUNICATION IMPAIRMENT  Goal: Ability to express needs and understand communication  2/20/2022 1941 by Dunia Jacobs RN  Outcome: Ongoing  2/20/2022 1639 by Yamilet Coates RN  Outcome: Ongoing     Problem: Non-Violent Restraints  Goal: Removal from restraints as soon as assessed to be safe  2/20/2022 1941 by Dunia Jacobs RN  Outcome: Ongoing  2/20/2022 1639 by Yamilet Coates RN  Outcome: Ongoing  Goal: No harm/injury to patient while restraints in use  2/20/2022 1941 by Dunia Jacobs RN  Outcome: Ongoing  2/20/2022 1639 by Yamilet Coates RN  Outcome: Ongoing  Goal: Patient's dignity will be maintained  2/20/2022 1941 by Dunia Jacobs RN  Outcome: Ongoing  2/20/2022 1639 by Yamilet Coates RN  Outcome: Ongoing     Problem: Skin Integrity:  Goal: Will show no infection signs and symptoms  Description: Will show no infection signs and symptoms  2/20/2022 1941 by Dunia Jacobs RN  Outcome: Ongoing  2/20/2022 1639 by Yamilet Coates RN  Outcome: Ongoing  Goal: Absence of new skin breakdown  Description: Absence of new skin breakdown  2/20/2022 1941 by Dunia Jacobs RN  Outcome: Ongoing  2/20/2022 1639 by Yamilet Coates RN  Outcome: Ongoing

## 2022-02-21 NOTE — PROGRESS NOTES
[facility])  Occupational Therapy Not Seen Note    DATE: 2022    NAME: Mariusz Kuhn  MRN: [id number]   : 1940      Patient not seen this date for Occupational Therapy due to:    Strict Bedrest: head of bed <20 degrees     Next Scheduled Treatment: check back 2022    Electronically signed by TAYLOR Thomas on 2022 at 8:38 AM

## 2022-02-21 NOTE — PROGRESS NOTES
Physical Therapy         Physical Therapy Cancel Note      DATE: 2022    NAME: Dannielle Torres  MRN: [id number]   : 1940      Patient not seen this date for Physical Therapy due to:    Strict Bedrest: head of bed <20 degrees       Electronically signed by Essence Beltran PT on 2022 at 8:45 AM

## 2022-02-21 NOTE — CARE COORDINATION
Received call from [facility], state they receive referral sent, pt is current with them, they request to be notified if pt goes home or to rehab.

## 2022-02-22 ENCOUNTER — APPOINTMENT (OUTPATIENT)
Dept: GENERAL RADIOLOGY | Age: 82
DRG: 025 | End: 2022-02-22
Payer: MEDICARE

## 2022-02-22 ENCOUNTER — APPOINTMENT (OUTPATIENT)
Dept: CT IMAGING | Age: 82
DRG: 025 | End: 2022-02-22
Payer: MEDICARE

## 2022-02-22 LAB
ABSOLUTE EOS #: 0.38 K/UL (ref 0–0.44)
ABSOLUTE IMMATURE GRANULOCYTE: <0.03 K/UL (ref 0–0.3)
ABSOLUTE LYMPH #: 1.54 K/UL (ref 1.1–3.7)
ABSOLUTE MONO #: 0.75 K/UL (ref 0.1–1.2)
ANION GAP SERPL CALCULATED.3IONS-SCNC: 12 MMOL/L (ref 9–17)
BASOPHILS # BLD: 1 % (ref 0–2)
BASOPHILS ABSOLUTE: 0.04 K/UL (ref 0–0.2)
BUN BLDV-MCNC: 6 MG/DL (ref 8–23)
CALCIUM SERPL-MCNC: 8.7 MG/DL (ref 8.6–10.4)
CHLORIDE BLD-SCNC: 104 MMOL/L (ref 98–107)
CO2: 23 MMOL/L (ref 20–31)
CREAT SERPL-MCNC: 0.51 MG/DL (ref 0.5–0.9)
CULTURE: NORMAL
EOSINOPHILS RELATIVE PERCENT: 5 % (ref 1–4)
GFR AFRICAN AMERICAN: >60 ML/MIN
GFR NON-AFRICAN AMERICAN: >60 ML/MIN
GFR SERPL CREATININE-BSD FRML MDRD: ABNORMAL ML/MIN/{1.73_M2}
GLUCOSE BLD-MCNC: 98 MG/DL (ref 70–99)
HCT VFR BLD CALC: 34.6 % (ref 36.3–47.1)
HEMOGLOBIN: 11.2 G/DL (ref 11.9–15.1)
IMMATURE GRANULOCYTES: 0 %
LYMPHOCYTES # BLD: 21 % (ref 24–43)
Lab: NORMAL
MCH RBC QN AUTO: 31.4 PG (ref 25.2–33.5)
MCHC RBC AUTO-ENTMCNC: 32.4 G/DL (ref 28.4–34.8)
MCV RBC AUTO: 96.9 FL (ref 82.6–102.9)
MONOCYTES # BLD: 10 % (ref 3–12)
NRBC AUTOMATED: 0 PER 100 WBC
PDW BLD-RTO: 15.8 % (ref 11.8–14.4)
PLATELET # BLD: 197 K/UL (ref 138–453)
PMV BLD AUTO: 9.5 FL (ref 8.1–13.5)
POTASSIUM SERPL-SCNC: 3.6 MMOL/L (ref 3.7–5.3)
RBC # BLD: 3.57 M/UL (ref 3.95–5.11)
RBC # BLD: ABNORMAL 10*6/UL
SEG NEUTROPHILS: 63 % (ref 36–65)
SEGMENTED NEUTROPHILS ABSOLUTE COUNT: 4.62 K/UL (ref 1.5–8.1)
SODIUM BLD-SCNC: 139 MMOL/L (ref 135–144)
SPECIMEN DESCRIPTION: NORMAL
WBC # BLD: 7.4 K/UL (ref 3.5–11.3)

## 2022-02-22 PROCEDURE — 2500000003 HC RX 250 WO HCPCS: Performed by: NURSE PRACTITIONER

## 2022-02-22 PROCEDURE — APPNB15 APP NON BILLABLE TIME 0-15 MINS: Performed by: NURSE PRACTITIONER

## 2022-02-22 PROCEDURE — 2580000003 HC RX 258: Performed by: REGISTERED NURSE

## 2022-02-22 PROCEDURE — 71045 X-RAY EXAM CHEST 1 VIEW: CPT

## 2022-02-22 PROCEDURE — 2000000003 HC NEURO ICU R&B

## 2022-02-22 PROCEDURE — 70450 CT HEAD/BRAIN W/O DYE: CPT

## 2022-02-22 PROCEDURE — 2580000003 HC RX 258: Performed by: STUDENT IN AN ORGANIZED HEALTH CARE EDUCATION/TRAINING PROGRAM

## 2022-02-22 PROCEDURE — 6370000000 HC RX 637 (ALT 250 FOR IP): Performed by: REGISTERED NURSE

## 2022-02-22 PROCEDURE — 6360000002 HC RX W HCPCS: Performed by: REGISTERED NURSE

## 2022-02-22 PROCEDURE — 99232 SBSQ HOSP IP/OBS MODERATE 35: CPT | Performed by: PSYCHIATRY & NEUROLOGY

## 2022-02-22 PROCEDURE — 2700000000 HC OXYGEN THERAPY PER DAY

## 2022-02-22 PROCEDURE — 36415 COLL VENOUS BLD VENIPUNCTURE: CPT

## 2022-02-22 PROCEDURE — 6370000000 HC RX 637 (ALT 250 FOR IP): Performed by: NURSE PRACTITIONER

## 2022-02-22 PROCEDURE — 80048 BASIC METABOLIC PNL TOTAL CA: CPT

## 2022-02-22 PROCEDURE — 85025 COMPLETE CBC W/AUTO DIFF WBC: CPT

## 2022-02-22 RX ORDER — LEVETIRACETAM 500 MG/1
500 TABLET ORAL 2 TIMES DAILY
Status: DISCONTINUED | OUTPATIENT
Start: 2022-02-22 | End: 2022-02-23

## 2022-02-22 RX ORDER — LABETALOL HYDROCHLORIDE 5 MG/ML
10 INJECTION, SOLUTION INTRAVENOUS EVERY 4 HOURS PRN
Status: DISCONTINUED | OUTPATIENT
Start: 2022-02-22 | End: 2022-03-02 | Stop reason: HOSPADM

## 2022-02-22 RX ORDER — HYDROCHLOROTHIAZIDE 25 MG/1
12.5 TABLET ORAL DAILY
Status: DISCONTINUED | OUTPATIENT
Start: 2022-02-23 | End: 2022-02-24

## 2022-02-22 RX ORDER — SENNA AND DOCUSATE SODIUM 50; 8.6 MG/1; MG/1
2 TABLET, FILM COATED ORAL DAILY
Status: DISCONTINUED | OUTPATIENT
Start: 2022-02-22 | End: 2022-03-02 | Stop reason: HOSPADM

## 2022-02-22 RX ADMIN — METFORMIN HYDROCHLORIDE 1000 MG: 500 TABLET ORAL at 08:20

## 2022-02-22 RX ADMIN — LOSARTAN POTASSIUM 50 MG: 50 TABLET, FILM COATED ORAL at 08:20

## 2022-02-22 RX ADMIN — SODIUM CHLORIDE: 9 INJECTION, SOLUTION INTRAVENOUS at 05:15

## 2022-02-22 RX ADMIN — LEVETIRACETAM 500 MG: 5 INJECTION INTRAVENOUS at 05:16

## 2022-02-22 RX ADMIN — SODIUM CHLORIDE, PRESERVATIVE FREE 10 ML: 5 INJECTION INTRAVENOUS at 21:07

## 2022-02-22 RX ADMIN — DESMOPRESSIN ACETATE 40 MG: 0.2 TABLET ORAL at 08:20

## 2022-02-22 RX ADMIN — LEVETIRACETAM 500 MG: 500 TABLET, FILM COATED ORAL at 21:07

## 2022-02-22 RX ADMIN — DOCUSATE SODIUM 50 MG AND SENNOSIDES 8.6 MG 2 TABLET: 8.6; 5 TABLET, FILM COATED ORAL at 17:07

## 2022-02-22 RX ADMIN — METFORMIN HYDROCHLORIDE 1000 MG: 500 TABLET ORAL at 17:07

## 2022-02-22 RX ADMIN — ALOGLIPTIN 12.5 MG: 12.5 TABLET, FILM COATED ORAL at 08:20

## 2022-02-22 RX ADMIN — HYDROCHLOROTHIAZIDE 12.5 MG: 25 TABLET ORAL at 08:20

## 2022-02-22 RX ADMIN — Medication 10 MG: at 14:14

## 2022-02-22 RX ADMIN — SODIUM CHLORIDE: 9 INJECTION, SOLUTION INTRAVENOUS at 18:03

## 2022-02-22 ASSESSMENT — PAIN SCALES - GENERAL
PAINLEVEL_OUTOF10: 0

## 2022-02-22 NOTE — PLAN OF CARE
Problem: Falls - Risk of:  Goal: Will remain free from falls  Description: Will remain free from falls  2/21/2022 1954 by Abel Shirley RN  Outcome: Ongoing  2/21/2022 1905 by Quan Perez RN  Outcome: Ongoing  Goal: Absence of physical injury  Description: Absence of physical injury  2/21/2022 1954 by Abel Shirley RN  Outcome: Ongoing  2/21/2022 1905 by Quan Perez RN  Outcome: Ongoing     Problem: Infection:  Goal: Will remain free from infection  Description: Will remain free from infection  2/21/2022 1954 by Abel Shirley RN  Outcome: Ongoing  2/21/2022 1905 by Quan Perez RN  Outcome: Ongoing     Problem: Safety:  Goal: Free from accidental physical injury  Description: Free from accidental physical injury  2/21/2022 1954 by Abel Shirley RN  Outcome: Ongoing  2/21/2022 1905 by Quan Perez RN  Outcome: Ongoing  Goal: Free from intentional harm  Description: Free from intentional harm  2/21/2022 1954 by Abel Shirley RN  Outcome: Ongoing  2/21/2022 1905 by Quan Perez RN  Outcome: Ongoing     Problem: Daily Care:  Goal: Daily care needs are met  Description: Daily care needs are met  2/21/2022 1954 by Abel Shirley RN  Outcome: Ongoing  2/21/2022 1905 by Quan Perez RN  Outcome: Ongoing     Problem: Pain:  Goal: Patient's pain/discomfort is manageable  Description: Patient's pain/discomfort is manageable  2/21/2022 1954 by Abel Shirley RN  Outcome: Ongoing  2/21/2022 1905 by Quan Perez RN  Outcome: Ongoing     Problem: Skin Integrity:  Goal: Skin integrity will stabilize  Description: Skin integrity will stabilize  2/21/2022 1954 by Abel Shirley RN  Outcome: Ongoing  2/21/2022 1905 by Quan Perez RN  Outcome: Ongoing     Problem: Discharge Planning:  Goal: Patients continuum of care needs are met  Description: Patients continuum of care needs are met  2/21/2022 1954 by Abel Shirley RN  Outcome: Ongoing  2/21/2022 1905 by Quan Perez RN  Outcome: Ongoing Problem: HEMODYNAMIC STATUS  Goal: Patient has stable vital signs and fluid balance  2/21/2022 1954 by Virgie Epps RN  Outcome: Ongoing  2/21/2022 1905 by Carin Lindo RN  Outcome: Ongoing     Problem: ACTIVITY INTOLERANCE/IMPAIRED MOBILITY  Goal: Mobility/activity is maintained at optimum level for patient  2/21/2022 1954 by Virgie Epps RN  Outcome: Ongoing  2/21/2022 1905 by Carin Lindo RN  Outcome: Ongoing     Problem: COMMUNICATION IMPAIRMENT  Goal: Ability to express needs and understand communication  2/21/2022 1954 by Virgie Epps RN  Outcome: Ongoing  2/21/2022 1905 by Carin Lindo RN  Outcome: Ongoing     Problem: Non-Violent Restraints  Goal: Removal from restraints as soon as assessed to be safe  2/21/2022 1954 by Virgie Epps RN  Outcome: Ongoing  2/21/2022 1905 by Carin Lindo RN  Outcome: Ongoing  Goal: No harm/injury to patient while restraints in use  2/21/2022 1954 by Virgie Epps RN  Outcome: Ongoing  2/21/2022 1905 by Carin Lindo RN  Outcome: Ongoing  Goal: Patient's dignity will be maintained  2/21/2022 1954 by Virgie Epps RN  Outcome: Ongoing  2/21/2022 1905 by Carin Lindo RN  Outcome: Ongoing     Problem: Skin Integrity:  Goal: Will show no infection signs and symptoms  Description: Will show no infection signs and symptoms  2/21/2022 1954 by Virgie Epps RN  Outcome: Ongoing  2/21/2022 1905 by Carin Lindo RN  Outcome: Ongoing  Goal: Absence of new skin breakdown  Description: Absence of new skin breakdown  2/21/2022 1954 by Virgie Epps RN  Outcome: Ongoing  2/21/2022 1905 by Carin Lindo RN  Outcome: Ongoing

## 2022-02-22 NOTE — PLAN OF CARE
Problem: Falls - Risk of:  Goal: Will remain free from falls  Description: Will remain free from falls  Outcome: Ongoing  Goal: Absence of physical injury  Description: Absence of physical injury  Outcome: Ongoing     Problem: Infection:  Goal: Will remain free from infection  Description: Will remain free from infection  Outcome: Ongoing     Problem: Safety:  Goal: Free from accidental physical injury  Description: Free from accidental physical injury  Outcome: Ongoing  Goal: Free from intentional harm  Description: Free from intentional harm  Outcome: Ongoing     Problem: Daily Care:  Goal: Daily care needs are met  Description: Daily care needs are met  Outcome: Ongoing     Problem: Pain:  Goal: Patient's pain/discomfort is manageable  Description: Patient's pain/discomfort is manageable  Outcome: Ongoing     Problem: Skin Integrity:  Goal: Skin integrity will stabilize  Description: Skin integrity will stabilize  Outcome: Ongoing     Problem: Discharge Planning:  Goal: Patients continuum of care needs are met  Description: Patients continuum of care needs are met  Outcome: Ongoing     Problem: HEMODYNAMIC STATUS  Goal: Patient has stable vital signs and fluid balance  Outcome: Ongoing     Problem: ACTIVITY INTOLERANCE/IMPAIRED MOBILITY  Goal: Mobility/activity is maintained at optimum level for patient  Outcome: Ongoing     Problem: COMMUNICATION IMPAIRMENT  Goal: Ability to express needs and understand communication  Outcome: Ongoing     Problem: Non-Violent Restraints  Goal: Removal from restraints as soon as assessed to be safe  Outcome: Ongoing  Goal: No harm/injury to patient while restraints in use  Outcome: Ongoing  Goal: Patient's dignity will be maintained  Outcome: Ongoing     Problem: Skin Integrity:  Goal: Will show no infection signs and symptoms  Description: Will show no infection signs and symptoms  Outcome: Ongoing  Goal: Absence of new skin breakdown  Description: Absence of new skin breakdown  Outcome: Ongoing

## 2022-02-22 NOTE — PROGRESS NOTES
Neurosurgery PRECIOUS/Resident    Daily Progress Note   CC:  Chief Complaint   Patient presents with    Altered Mental Status     2/22/2022  10:00 AM    Chart reviewed. No acute events overnight. No new complaints. Apparently patient appears more drowsy this morning during rounds with Dr. Raysa Christiansen compared to yesterday. Subdural drain in Repeat CT head reviewed with Dr. Raysa Christiansen this morning    Vitals:    02/22/22 0515 02/22/22 0600 02/22/22 0700 02/22/22 0803   BP: (!) 156/89 138/73 122/63    Pulse: 90 89 75    Resp: 22 18 17 19   Temp:  98.2 °F (36.8 °C)     TempSrc:  Oral     SpO2: 100% 100% 100% 100%       PE:   E2 V4 M5   PERRL  Opening eyes to painful stimulation, she was able to state her name which is apparently her baseline  Localizing bilateral upper extremities to painful stimulation  Moving extremities bilateral lower appears equally and to painful stimulation    EVD output 44ml/12 hours   Incision clean dry intact no drainage from site     Lab Results   Component Value Date    WBC 7.4 02/22/2022    HGB 11.2 (L) 02/22/2022    HCT 34.6 (L) 02/22/2022     02/22/2022    CHOL 137 10/19/2017    TRIG 56 10/19/2017    HDL 92 10/19/2017    ALT 12 02/17/2022    AST 21 02/17/2022     02/22/2022    K 3.6 (L) 02/22/2022     02/22/2022    CREATININE 0.51 02/22/2022    BUN 6 (L) 02/22/2022    CO2 23 02/22/2022    TSH 3.49 03/14/2018    INR 1.2 02/17/2022    LABA1C 5.9 10/19/2017    LABMICR 8 10/10/2016       Radiology   CT HEAD WO CONTRAST    Result Date: 2/22/2022  EXAMINATION: CT OF THE HEAD WITHOUT CONTRAST  2/22/2022 4:28 am TECHNIQUE: CT of the head was performed without the administration of intravenous contrast. Dose modulation, iterative reconstruction, and/or weight based adjustment of the mA/kV was utilized to reduce the radiation dose to as low as reasonably achievable. COMPARISON: CT head without contrast February 20, 2022 at 0507 hours.  HISTORY: ORDERING SYSTEM PROVIDED HISTORY: follow up TECHNOLOGIST PROVIDED HISTORY: follow up Reason for Exam: follow up FINDINGS: BRAIN/VENTRICLES: Right cerebral acute on subacute subdural hemorrhage is stable in volume and extent. Multifocal right frontal, right insular and right temporal scattered acute subarachnoid hemorrhage is stable in appearance. Unchanged leftward midline shift at the level of the septum pellucidum is identified which measures 3 mm. Anterior right thalamic remote infarct is again noted measuring up to 16 mm in diameter. Ill-defined hypoattenuation is present within cerebral white matter consistent with moderate microvascular ischemic disease. Mild diffuse decrease in cerebral volume is noted with corresponding prominence of the sulci and ventricles. Gray/white matter differentiation is unremarkable. Bilateral basal ganglia scattered remote lacunar infarcts are again seen. ORBITS: The visualized portion of the orbits demonstrate no acute abnormality. SINUSES: The visualized paranasal sinuses and mastoid air cells demonstrate no acute abnormality. SOFT TISSUES/SKULL:  Right frontal approach extra-axial surgical drain is stable in position. 1. Stable volume of right cerebral acute on subacute subdural hemorrhage. 2. Unchanged leftward midline shift at the level of the septum pellucidum measuring 3 mm. 3. Stable positioning right-sided extra-axial surgical drain. 4. Unchanged distribution and appearance of right frontal, insular and temporal lobe scattered acute subarachnoid hemorrhage. 5. Moderate cerebral white matter chronic microvascular ischemic disease. 6. Mild diffuse cerebral atrophy. 7. Redemonstration anterior right thalamic remote infarct. 8. Redemonstration bilateral basal ganglia multifocal remote lacunar infarcts.      A/P  80 y.o. female who presents with right-sided subdural hematoma  POD #3 s/p right bur hole for subdural hematoma evacuation       - Okay to discontinue high flow   - continue bedrest with HOB at 10 degrees, okay to have HOB at 20 degrees while eating              - keep subdural drain at 20 cmH2O below head of bed, contact neurosurgery if no drain output  -Continue Keppra for seizure prophylaxis  - SCDs for DVT prophylaxis not recommending Lovenox at this time  - Neuro checks per floor protocol      Please contact neurosurgery with any changes in patients neurologic status.        Gracie Lau CNP  2/22/22  10:00 AM

## 2022-02-22 NOTE — PROGRESS NOTES
Physical Therapy        Physical Therapy Cancel Note      DATE: 2022    NAME: Krystian Landis  MRN: [id number]   : 1940      Patient not seen this date for Physical Therapy due to:    Strict Bedrest: PT will check back as time allows.       Electronically signed by Eva Grimes PT on 2022 at 9:56 AM

## 2022-02-22 NOTE — PROGRESS NOTES
[facility])  Occupational Therapy Not Seen Note    DATE: 2022    NAME: Krystian Landis  MRN: [id number]   : 1940      Patient not seen this date for Occupational Therapy due to:    Strict Bedrest:    Next Scheduled Treatment: check back 2022    Electronically signed by TAYLOR Rudolph on 2022 at 10:04 AM

## 2022-02-22 NOTE — PROGRESS NOTES
Daily Progress Note  Neuro Critical Care    Patient Name: Yassine Hackett  Patient : 1940  Room/Bed: 2561/3561-73  Code Status:Full  Allergies: No Known Allergies    CHIEF COMPLAINT:      AMS     INTERVAL HISTORY    Initial Presentation (Admitted 22): The patient is G 65 y.o. 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  Last known well was yesterday at approximately 8 PM when patient was going to bed.  Daughter states that she woke up today and she had \"a choking \"like episode and was exhibiting altered mental status.  Patient does have a history of baseline and is normally not alert to place or time however daughter reports she is normally able to recognize family members and was unable to recognize her daughter today which made her concerned.  She did call EMS, patient initially hypotensive via EMS however normotensive in the emergency department.  Work-up was remarkable for acute on chronic right subdural hematoma measuring up to 15 mm in thickness with 4 mm of right to left shift.  Patient is prescribed a daily aspirin however daughter states she does not regularly get this medication as daughter believes that she does not need it.  No history of trauma recently however daughter does report that the patient fell onto her bottom approximately 1 month ago. Hospital Course:    Did speak to daughter regarding [address] who wants to continue with CPR, at least one round but declines patient being intubated. She states that she have to think about it and is uncertain. At baseline, patient has dementia and neurosurgery wants a repeat imaging in the morning for possible intervention. No change in her neurological status. CT head has been stable.   : Central line placed yesterday, OR with NSg for Bur hole  : CT head improved  : Subdural drain 8mL out/hr. Last 24h:   No acute events overnight. CT head this morning stable. Subdural drain with 86 mL out/24 hours. High flow nasal cannula discontinued this morning. Desaturating to low 90s on nasal cannula. CXR stable. Continue strict bedrest with HOB 10 degrees other than while eating, OK for 20 degrees.      CURRENT MEDICATIONS:  SCHEDULED MEDICATIONS:   levetiracetam  500 mg IntraVENous Q12H    atorvastatin  40 mg Oral Daily    sodium chloride flush  5-40 mL IntraVENous 2 times per day    losartan  50 mg Oral Daily    And    hydroCHLOROthiazide  12.5 mg Oral Daily    metFORMIN  1,000 mg Oral BID WC    And    alogliptin  12.5 mg Oral Daily     CONTINUOUS INFUSIONS:   sodium chloride      sodium chloride 50 mL/hr at 22 0600     PRN MEDICATIONS:   sodium chloride flush, sodium chloride, ondansetron **OR** ondansetron, polyethylene glycol, acetaminophen    VITALS:  Temperature Range: Temp: 98.2 °F (36.8 °C) Temp  Av °F (36.7 °C)  Min: 97.6 °F (36.4 °C)  Max: 98.4 °F (36.9 °C)  BP Range: Systolic (64SZV), VOW:499 , Min:122 , YUQ:423     Diastolic (61KHB), JNR:24, Min:56, Max:130    Pulse Range: Pulse  Av  Min: 64  Max: 94  Respiration Range: Resp  Av.3  Min: 12  Max: 22  Current Pulse Ox: SpO2: 100 %  24HR Pulse Ox Range: SpO2  Av.6 %  Min: 89 %  Max: 100 %  Patient Vitals for the past 12 hrs:   BP Temp Temp src Pulse Resp SpO2   22 1200 (!) 134/59 98.2 °F (36.8 °C) Oral 81 20 100 %   22 1100 138/61 -- -- 80 20 100 %   22 1000 (!) 145/67 -- -- 76 17 100 %   22 0900 126/63 -- -- 90 17 (!) 89 %   22 0803 -- -- -- -- 19 100 %   22 0800 136/66 -- -- 81 20 100 %   22 0700 122/63 -- -- 75 17 100 %   22 0600 138/73 98.2 °F (36.8 °C) Oral 89 18 100 %   22 0515 (!) 156/89 -- -- 90 22 100 %   22 0400 (!) 147/82 97.9 °F (36.6 °C) Oral 94 17 100 %   22 0300 (!) 157/67 -- -- 94 12 100 %   02/22/22 0200 (!) 151/130 98.2 °F (36.8 °C) Oral 80 16 100 %   02/22/22 0100 (!) 158/67 -- -- 80 18 100 %     Estimated body mass index is 28.39 kg/m² as calculated from the following:    Height as of 3/23/18: 5' 2\" (1.575 m). Weight as of 4/11/18: 155 lb 3.2 oz (70.4 kg).  []<16 Severe malnutrition  []16-16.99 Moderate malnutrition  []17-18.49 Mild malnutrition  []18.5-24.9 Normal  [x]25-29.9 Overweight (not obese)  []30-34.9 Obese class 1 (Low Risk)  []35-39.9 Obese class 2 (Moderate Risk)  []?40 Obese class 3 (High Risk)    RECENT LABS:   Lab Results   Component Value Date    WBC 7.4 02/22/2022    HGB 11.2 (L) 02/22/2022    HCT 34.6 (L) 02/22/2022     02/22/2022    CHOL 137 10/19/2017    TRIG 56 10/19/2017    HDL 92 10/19/2017    ALT 12 02/17/2022    AST 21 02/17/2022     02/22/2022    K 3.6 (L) 02/22/2022     02/22/2022    CREATININE 0.51 02/22/2022    BUN 6 (L) 02/22/2022    CO2 23 02/22/2022    TSH 3.49 03/14/2018    INR 1.2 02/17/2022    LABA1C 5.9 10/19/2017    LABMICR 8 10/10/2016     24 HOUR INTAKE/OUTPUT:    Intake/Output Summary (Last 24 hours) at 2/22/2022 1222  Last data filed at 2/22/2022 1200  Gross per 24 hour   Intake 1137.1 ml   Output 776 ml   Net 361.1 ml       IMAGING:   XR CHEST PORTABLE   Final Result   Slightly greater appearing changes left base, as above, but no other interval   change. No radiographic CHF or large pleural effusion. CT HEAD WO CONTRAST   Final Result   1. Stable volume of right cerebral acute on subacute subdural hemorrhage. 2. Unchanged leftward midline shift at the level of the septum pellucidum   measuring 3 mm. 3. Stable positioning right-sided extra-axial surgical drain. 4. Unchanged distribution and appearance of right frontal, insular and   temporal lobe scattered acute subarachnoid hemorrhage. 5. Moderate cerebral white matter chronic microvascular ischemic disease. 6. Mild diffuse cerebral atrophy.    7. Redemonstration anterior right thalamic remote infarct. 8. Redemonstration bilateral basal ganglia multifocal remote lacunar infarcts. XR CHEST PORTABLE   Final Result   Interval improvement of vascular congestion. CT Head wo Contrast   Final Result   Right joceline hole and subdural drain placement for evacuation of right subdural   hematoma. Interval reduction of hematoma and resolution of midline shift. New subarachnoid hemorrhage. XR SKULL (<4 VIEWS)   Final Result   Recent postop changes of the calvarium as noted above. CT HEAD WO CONTRAST   Final Result   Stable examination. CT head WO contrast   Final Result   Redemonstration of a right frontoparietal subdural hematoma measuring 14 mm   in thickness resulting in 4 mm of right to left midline shift, not   significantly changed from the previous exam.      No new hemorrhage or acute infarct identified. XR CHEST PORTABLE   Final Result      Mild to moderate cardiomegaly and findings suggestive of pulmonary edema with   small left pleural effusion. CT Head WO Contrast   Final Result   Addendum 1 of 1   ADDENDUM:   Case discussed with Dr. Dirk sykes at 10:24 p.m. Final   Acute on chronic right subdural hematoma with midline shift as above      The findings were sent to the Radiology Results [address] at 10:18   pm on 2/17/2022to be communicated to a licensed caregiver. Labs and Images reviewed with:  [] Dr. Nilsa Godfrey. Amee    [x] Dr. Mary Jane Mccarthy  [] Dr. Bib Meyer  [] There are no new interval images to review. PHYSICAL EXAM       CONSTITUTIONAL:  Awake, alert, and oriented to self only.     HEAD:  normocephalic, atraumatic    EYES:  PERRLA, EOMI.   ENT:  moist mucous membranes   NECK:  supple, symmetric   LUNGS:  Equal air entry bilaterally   CARDIOVASCULAR:  normal s1 / s2, RRR, distal pulses intact   ABDOMEN:  Soft, no rigidity   NEUROLOGIC:  Mental Status:  Not oriented to date and Not oriented to place,awake             Cranial Nerves:    cranial nerves II-XII are grossly intact    Motor Exam:    Localizes bilateral upper extremities. Moves all extremities spontaneously. DRAINS:  [x] Subdural drain, managed by neurosurgery    ASSESSMENT AND PLAN:       This is a 81 y.o. 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 Ewa Tirado does have a history of dementia however daughter was concerned as patient was acting different from her baseline today.  CT in the emergency department revealed subdural hematoma with shift.  Neurosurgery and neuro critical care consulted out of the emergency department. Taken to the OR for right sided joceline hole with subdural evacuation and drain placement. NEUROLOGIC:  - POD # 3 right joceline hole with subdural evacuation  - Subdural drain with 86 mL out/24 hours  - CT head this AM stable  - High flow nasal cannula for pneumocephalus discontinued this morning  - continue Keppra 500 mg BID for ppx  - Goal SBP < 160  - Neuro checks per protocol    CARDIOVASCULAR:  - Goal SBP < 160  - Labetalol PRN  - History of HTN, continue HCTZ 12.5 mg QD; increase Cozaar to 75 mg QD starting tomorrow  - Continue telemetry    PULMONARY:  - High flow discontinued this morning  - Requiring  2-4L NC   - CXR stable  - Encourage IS if able to participate  - Wean nasal cannula as tolerated    RENAL/FLUID/ELECTROLYTE:  - BUN 6/ Creatinine 0.51  - Monitor I&Os  - IVF: normal saline @ 50 mL/hr  - Replace electrolytes PRN  - Daily BMP    GI/NUTRITION:  NUTRITION:  ADULT DIET;  Regular  - Bowel regimen: Start senna-s daily  - Unknown last BM  - GI prophylaxis: Not indicated    ID:  - Tmax 36.8  - WBC 7.4  - Continue to monitor for fevers  - Daily CBC    HEME:   - H&H 11.2/34.6  - Platelets 171  - Daily CBC    ENDOCRINE:  - Continue to monitor blood glucose, goal <180  - Continue Metformin and alogliptin 12.5 mg QD    OTHER:  - PT/OT/ST   - Code Status: Full    PROPHYLAXIS:  Stress ulcer: NI    DVT PROPHYLAXIS:  - SCD sleeves - Thigh High   - No chemoprophylaxis anticoagulation at this time. DISPOSITION:  [x] To remain ICU: subdural drain  management  [] OK for out of ICU from Neuro Critical Care standpoint    We will continue to follow along. For any changes in exam or patient status please contact Neuro Critical Care.       GRACIE Gibbs - CNP  Neuro Critical Care  Pager [phone]  2/22/2022     12:22 PM

## 2022-02-23 LAB
ABSOLUTE EOS #: 0.32 K/UL (ref 0–0.44)
ABSOLUTE IMMATURE GRANULOCYTE: 0.03 K/UL (ref 0–0.3)
ABSOLUTE LYMPH #: 1.61 K/UL (ref 1.1–3.7)
ABSOLUTE MONO #: 0.96 K/UL (ref 0.1–1.2)
ANION GAP SERPL CALCULATED.3IONS-SCNC: 10 MMOL/L (ref 9–17)
BASOPHILS # BLD: 0 % (ref 0–2)
BASOPHILS ABSOLUTE: 0.03 K/UL (ref 0–0.2)
BUN BLDV-MCNC: 5 MG/DL (ref 8–23)
CALCIUM SERPL-MCNC: 8.6 MG/DL (ref 8.6–10.4)
CHLORIDE BLD-SCNC: 106 MMOL/L (ref 98–107)
CO2: 23 MMOL/L (ref 20–31)
CREAT SERPL-MCNC: 0.47 MG/DL (ref 0.5–0.9)
CULTURE: NORMAL
EOSINOPHILS RELATIVE PERCENT: 4 % (ref 1–4)
GFR AFRICAN AMERICAN: >60 ML/MIN
GFR NON-AFRICAN AMERICAN: >60 ML/MIN
GFR SERPL CREATININE-BSD FRML MDRD: ABNORMAL ML/MIN/{1.73_M2}
GLUCOSE BLD-MCNC: 81 MG/DL (ref 70–99)
HCT VFR BLD CALC: 33.8 % (ref 36.3–47.1)
HEMOGLOBIN: 11.4 G/DL (ref 11.9–15.1)
IMMATURE GRANULOCYTES: 0 %
LYMPHOCYTES # BLD: 21 % (ref 24–43)
Lab: NORMAL
MCH RBC QN AUTO: 31.3 PG (ref 25.2–33.5)
MCHC RBC AUTO-ENTMCNC: 33.7 G/DL (ref 28.4–34.8)
MCV RBC AUTO: 92.9 FL (ref 82.6–102.9)
MONOCYTES # BLD: 13 % (ref 3–12)
NRBC AUTOMATED: 0 PER 100 WBC
PDW BLD-RTO: 15.6 % (ref 11.8–14.4)
PLATELET # BLD: 201 K/UL (ref 138–453)
PMV BLD AUTO: 10.4 FL (ref 8.1–13.5)
POTASSIUM SERPL-SCNC: 3.3 MMOL/L (ref 3.7–5.3)
RBC # BLD: 3.64 M/UL (ref 3.95–5.11)
RBC # BLD: ABNORMAL 10*6/UL
SEG NEUTROPHILS: 62 % (ref 36–65)
SEGMENTED NEUTROPHILS ABSOLUTE COUNT: 4.66 K/UL (ref 1.5–8.1)
SODIUM BLD-SCNC: 139 MMOL/L (ref 135–144)
SPECIMEN DESCRIPTION: NORMAL
WBC # BLD: 7.6 K/UL (ref 3.5–11.3)

## 2022-02-23 PROCEDURE — 6370000000 HC RX 637 (ALT 250 FOR IP): Performed by: FAMILY MEDICINE

## 2022-02-23 PROCEDURE — 99232 SBSQ HOSP IP/OBS MODERATE 35: CPT | Performed by: PSYCHIATRY & NEUROLOGY

## 2022-02-23 PROCEDURE — 85025 COMPLETE CBC W/AUTO DIFF WBC: CPT

## 2022-02-23 PROCEDURE — 6370000000 HC RX 637 (ALT 250 FOR IP): Performed by: NURSE PRACTITIONER

## 2022-02-23 PROCEDURE — 2580000003 HC RX 258: Performed by: STUDENT IN AN ORGANIZED HEALTH CARE EDUCATION/TRAINING PROGRAM

## 2022-02-23 PROCEDURE — 2500000003 HC RX 250 WO HCPCS: Performed by: NURSE PRACTITIONER

## 2022-02-23 PROCEDURE — APPNB15 APP NON BILLABLE TIME 0-15 MINS: Performed by: NURSE PRACTITIONER

## 2022-02-23 PROCEDURE — 2000000003 HC NEURO ICU R&B

## 2022-02-23 PROCEDURE — 6370000000 HC RX 637 (ALT 250 FOR IP): Performed by: REGISTERED NURSE

## 2022-02-23 PROCEDURE — 80048 BASIC METABOLIC PNL TOTAL CA: CPT

## 2022-02-23 PROCEDURE — 36415 COLL VENOUS BLD VENIPUNCTURE: CPT

## 2022-02-23 PROCEDURE — 94761 N-INVAS EAR/PLS OXIMETRY MLT: CPT

## 2022-02-23 RX ORDER — LEVETIRACETAM 100 MG/ML
500 SOLUTION ORAL 2 TIMES DAILY
Status: COMPLETED | OUTPATIENT
Start: 2022-02-23 | End: 2022-02-25

## 2022-02-23 RX ORDER — POTASSIUM CHLORIDE 20 MEQ/1
60 TABLET, EXTENDED RELEASE ORAL ONCE
Status: DISCONTINUED | OUTPATIENT
Start: 2022-02-23 | End: 2022-02-23

## 2022-02-23 RX ADMIN — METFORMIN HYDROCHLORIDE 1000 MG: 500 TABLET ORAL at 18:00

## 2022-02-23 RX ADMIN — HYDROCHLOROTHIAZIDE 12.5 MG: 25 TABLET ORAL at 08:46

## 2022-02-23 RX ADMIN — LOSARTAN POTASSIUM 75 MG: 50 TABLET, FILM COATED ORAL at 08:46

## 2022-02-23 RX ADMIN — METFORMIN HYDROCHLORIDE 1000 MG: 500 TABLET ORAL at 08:46

## 2022-02-23 RX ADMIN — LEVETIRACETAM 500 MG: 500 SOLUTION ORAL at 20:43

## 2022-02-23 RX ADMIN — ALOGLIPTIN 12.5 MG: 12.5 TABLET, FILM COATED ORAL at 08:46

## 2022-02-23 RX ADMIN — DESMOPRESSIN ACETATE 40 MG: 0.2 TABLET ORAL at 08:46

## 2022-02-23 RX ADMIN — Medication 10 MG: at 20:43

## 2022-02-23 RX ADMIN — POTASSIUM BICARBONATE 60 MEQ: 391 TABLET, EFFERVESCENT ORAL at 15:15

## 2022-02-23 RX ADMIN — SODIUM CHLORIDE: 9 INJECTION, SOLUTION INTRAVENOUS at 17:53

## 2022-02-23 RX ADMIN — DOCUSATE SODIUM 50 MG AND SENNOSIDES 8.6 MG 2 TABLET: 8.6; 5 TABLET, FILM COATED ORAL at 08:46

## 2022-02-23 RX ADMIN — LEVETIRACETAM 500 MG: 500 TABLET, FILM COATED ORAL at 09:00

## 2022-02-23 ASSESSMENT — PAIN SCALES - GENERAL
PAINLEVEL_OUTOF10: 0
PAINLEVEL_OUTOF10: 0

## 2022-02-23 NOTE — CARE COORDINATION
SBIRT deferred due to diagnosis of dementia, pt not alert to place or time.     Alcohol Screening and Brief Intervention            Deferred [x]    Completed on: 2/23/2022   [facility], MARCIO

## 2022-02-23 NOTE — PROGRESS NOTES
Neurosurgery PRECIOUS/Resident    Daily Progress Note   CC:  Chief Complaint   Patient presents with    Altered Mental Status     2/23/2022  10:59 AM    Chart reviewed. No acute events overnight. No new complaints. Vitals:    02/23/22 0300 02/23/22 0400 02/23/22 0500 02/23/22 0600   BP: (!) 144/69 (!) 160/71 (!) 123/51 (!) 140/55   Pulse: 72 81 66 59   Resp: 19 22 22 22   Temp:  98.8 °F (37.1 °C)  98.2 °F (36.8 °C)   TempSrc:  Oral  Oral   SpO2: 100% 99% 100% 100%       PE:   E3 V4 oriented to self M6  Opens eyes to voice  Was able to start her name only for me  Following commands   Moving bilateral lower extremities to pain    Drain output:18ml/12 hours   Incision: clean dry intact       Lab Results   Component Value Date    WBC 7.6 02/23/2022    HGB 11.4 (L) 02/23/2022    HCT 33.8 (L) 02/23/2022     02/23/2022    CHOL 137 10/19/2017    TRIG 56 10/19/2017    HDL 92 10/19/2017    ALT 12 02/17/2022    AST 21 02/17/2022     02/23/2022    K 3.3 (L) 02/23/2022     02/23/2022    CREATININE 0.47 (L) 02/23/2022    BUN 5 (L) 02/23/2022    CO2 23 02/23/2022    TSH 3.49 03/14/2018    INR 1.2 02/17/2022    LABA1C 5.9 10/19/2017    LABMICR 8 10/10/2016           A/P  80 y.o. female who presents with right-sided subdural hematoma  POD #4 s/p right bur hole for subdural hematoma evacuation        - Obtain CT Head in AM 2/24   - maintain subdural drain at this time- possible discontinue tomorrow 2/24 after CT head completed and reviewed   - Neuro checks per floor protocol   - continue bedrest with HOB at 10 degrees, okay to have HOB at 20 degrees while eating              - keep subdural drain at 20 cmH2O below head of bed, contact neurosurgery if no drain output    Please contact neurosurgery with any changes in patients neurologic status.        Vasiliy Momin, CNP  2/23/22  10:59 AM

## 2022-02-23 NOTE — PLAN OF CARE
Problem: Falls - Risk of:  Goal: Will remain free from falls  Description: Will remain free from falls  2/22/2022 1935 by Deep Hoyos RN  Outcome: Ongoing  2/22/2022 1900 by Shaniqua Kuhn RN  Outcome: Ongoing  Goal: Absence of physical injury  Description: Absence of physical injury  2/22/2022 1935 by Deep Hoyos RN  Outcome: Ongoing  2/22/2022 1900 by Shaniqua Kuhn RN  Outcome: Ongoing     Problem: Infection:  Goal: Will remain free from infection  Description: Will remain free from infection  2/22/2022 1935 by Deep Hoyos RN  Outcome: Ongoing  2/22/2022 1900 by Shaniqua Kuhn RN  Outcome: Ongoing     Problem: Safety:  Goal: Free from accidental physical injury  Description: Free from accidental physical injury  2/22/2022 1935 by Deep Hoyos RN  Outcome: Ongoing  2/22/2022 1900 by Shaniqua Kuhn RN  Outcome: Ongoing  Goal: Free from intentional harm  Description: Free from intentional harm  2/22/2022 1935 by Deep Hoyos RN  Outcome: Ongoing  2/22/2022 1900 by Shaniqua Kuhn RN  Outcome: Ongoing     Problem: Daily Care:  Goal: Daily care needs are met  Description: Daily care needs are met  2/22/2022 1935 by Deep Hoyos RN  Outcome: Ongoing  2/22/2022 1900 by Shaniqua Kuhn RN  Outcome: Ongoing     Problem: Pain:  Goal: Patient's pain/discomfort is manageable  Description: Patient's pain/discomfort is manageable  2/22/2022 1935 by Deep Hoyos RN  Outcome: Ongoing  2/22/2022 1900 by Shaniqua Kuhn RN  Outcome: Ongoing     Problem: Skin Integrity:  Goal: Skin integrity will stabilize  Description: Skin integrity will stabilize  2/22/2022 1935 by Deep Hoyos RN  Outcome: Ongoing  2/22/2022 1900 by Shaniqua Kuhn RN  Outcome: Ongoing     Problem: Discharge Planning:  Goal: Patients continuum of care needs are met  Description: Patients continuum of care needs are met  2/22/2022 1935 by Deep Hoyos RN  Outcome: Ongoing  2/22/2022 1900 by Shaniqua Kuhn RN  Outcome: Ongoing

## 2022-02-23 NOTE — PROGRESS NOTES
[facility] ([facility])  Occupational Therapy Not Seen Note    DATE: 2022    NAME: Mariusz Kuhn  MRN: [id number]   : 1940      Patient not seen this date for Occupational Therapy due to:    Strict Bedrest: HOB 20 degrees for eating only     Next Scheduled Treatment: check back 2022    Electronically signed by TAYLOR Thomas on 2022 at 8:49 AM

## 2022-02-23 NOTE — PROGRESS NOTES
Physical Therapy         Physical Therapy Cancel Note      DATE: 2022    NAME: Josue Gomez  MRN: [id number]   : 1940      Patient not seen this date for Physical Therapy due to:    Patient is on strict bedrest and to have HOB flat, up to 20 degrees only for meals. Will continue to pursue for mobility-readiness.       Electronically signed by Mika Morelos PT on 2022 at 8:29 AM

## 2022-02-23 NOTE — PROGRESS NOTES
Daily Progress Note  Neuro Critical Care    Patient Name: Usama Rose  Patient : 1940  Room/Bed: 2617/7245-22  Code Status:Full  Allergies: No Known Allergies    CHIEF COMPLAINT:      AMS     INTERVAL HISTORY    Initial Presentation (Admitted 22): The patient is G 3 y.o. 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  Last known well was yesterday at approximately 8 PM when patient was going to bed.  Daughter states that she woke up today and she had \"a choking \"like episode and was exhibiting altered mental status.  Patient does have a history of baseline and is normally not alert to place or time however daughter reports she is normally able to recognize family members and was unable to recognize her daughter today which made her concerned.  She did call EMS, patient initially hypotensive via EMS however normotensive in the emergency department.  Work-up was remarkable for acute on chronic right subdural hematoma measuring up to 15 mm in thickness with 4 mm of right to left shift.  Patient is prescribed a daily aspirin however daughter states she does not regularly get this medication as daughter believes that she does not need it.  No history of trauma recently however daughter does report that the patient fell onto her bottom approximately 1 month ago. Hospital Course:    Did speak to daughter regarding [address] who wants to continue with CPR, at least one round but declines patient being intubated. She states that she have to think about it and is uncertain. At baseline, patient has dementia and neurosurgery wants a repeat imaging in the morning for possible intervention. No change in her neurological status. CT head has been stable.   : Central line placed yesterday, OR with NSg for Bur hole  : CT head improved  : Subdural drain 8mL out/hr.  :  CT head this morning stable. Subdural drain with 86 mL out/24 hours. High flow nasal cannula discontinued this morning. Desaturating to low 90s on nasal cannula. CXR stable. Continue strict bedrest with HOB 10 degrees other than while eating, OK for 20 degrees. Last 24h:   No acute events overnight. Subdural drain with output 1-2cc/hr. Opens eyes to name and responds.     CURRENT MEDICATIONS:  SCHEDULED MEDICATIONS:   potassium chloride  60 mEq Oral Once    levETIRAcetam  500 mg Oral BID    losartan  75 mg Oral Daily    And    hydroCHLOROthiazide  12.5 mg Oral Daily    sennosides-docusate sodium  2 tablet Oral Daily    atorvastatin  40 mg Oral Daily    sodium chloride flush  5-40 mL IntraVENous 2 times per day    metFORMIN  1,000 mg Oral BID WC    And    alogliptin  12.5 mg Oral Daily     CONTINUOUS INFUSIONS:   sodium chloride      sodium chloride 50 mL/hr at 22 1900     PRN MEDICATIONS:   labetalol, sodium chloride flush, sodium chloride, ondansetron **OR** ondansetron, polyethylene glycol, acetaminophen    VITALS:  Temperature Range: Temp: 98.2 °F (36.8 °C) Temp  Av.3 °F (36.8 °C)  Min: 98 °F (36.7 °C)  Max: 98.8 °F (37.1 °C)  BP Range: Systolic (68QGH), OZK:115 , Min:123 , XHM:597     Diastolic (48XCK), MPE:69, Min:51, Max:114    Pulse Range: Pulse  Av.7  Min: 59  Max: 95  Respiration Range: Resp  Av.4  Min: 16  Max: 34  Current Pulse Ox: SpO2: 100 %  24HR Pulse Ox Range: SpO2  Av.8 %  Min: 98 %  Max: 100 %  Patient Vitals for the past 12 hrs:   BP Temp Temp src Pulse Resp SpO2   22 0600 (!) 140/55 98.2 °F (36.8 °C) Oral 59 22 100 %   22 0500 (!) 123/51 -- -- 66 22 100 %   22 0400 (!) 160/71 98.8 °F (37.1 °C) Oral 81 22 99 %   22 0300 (!) 144/69 -- -- 72 19 100 %   22 0200 136/63 98.6 °F (37 °C) Oral 69 20 100 %   22 0100 124/61 -- -- 64 19 98 %   22 0000 (!) 131/57 98.4 °F (36.9 °C) Oral 65 16 100 %   02/22/22 2300 133/71 -- -- 67 18 100 %     Estimated body mass index is 28.39 kg/m² as calculated from the following:    Height as of 3/23/18: 5' 2\" (1.575 m). Weight as of 4/11/18: 155 lb 3.2 oz (70.4 kg).  []<16 Severe malnutrition  []16-16.99 Moderate malnutrition  []17-18.49 Mild malnutrition  []18.5-24.9 Normal  [x]25-29.9 Overweight (not obese)  []30-34.9 Obese class 1 (Low Risk)  []35-39.9 Obese class 2 (Moderate Risk)  []?40 Obese class 3 (High Risk)    RECENT LABS:   Lab Results   Component Value Date    WBC 7.6 02/23/2022    HGB 11.4 (L) 02/23/2022    HCT 33.8 (L) 02/23/2022     02/23/2022    CHOL 137 10/19/2017    TRIG 56 10/19/2017    HDL 92 10/19/2017    ALT 12 02/17/2022    AST 21 02/17/2022     02/23/2022    K 3.3 (L) 02/23/2022     02/23/2022    CREATININE 0.47 (L) 02/23/2022    BUN 5 (L) 02/23/2022    CO2 23 02/23/2022    TSH 3.49 03/14/2018    INR 1.2 02/17/2022    LABA1C 5.9 10/19/2017    LABMICR 8 10/10/2016     24 HOUR INTAKE/OUTPUT:    Intake/Output Summary (Last 24 hours) at 2/23/2022 1048  Last data filed at 2/23/2022 0600  Gross per 24 hour   Intake 944.89 ml   Output 485 ml   Net 459.89 ml       IMAGING:   XR CHEST PORTABLE   Final Result   Slightly greater appearing changes left base, as above, but no other interval   change. No radiographic CHF or large pleural effusion. CT HEAD WO CONTRAST   Final Result   1. Stable volume of right cerebral acute on subacute subdural hemorrhage. 2. Unchanged leftward midline shift at the level of the septum pellucidum   measuring 3 mm. 3. Stable positioning right-sided extra-axial surgical drain. 4. Unchanged distribution and appearance of right frontal, insular and   temporal lobe scattered acute subarachnoid hemorrhage. 5. Moderate cerebral white matter chronic microvascular ischemic disease. 6. Mild diffuse cerebral atrophy.    7. Redemonstration anterior right thalamic remote infarct. 8. Redemonstration bilateral basal ganglia multifocal remote lacunar infarcts. XR CHEST PORTABLE   Final Result   Interval improvement of vascular congestion. CT Head wo Contrast   Final Result   Right joceline hole and subdural drain placement for evacuation of right subdural   hematoma. Interval reduction of hematoma and resolution of midline shift. New subarachnoid hemorrhage. XR SKULL (<4 VIEWS)   Final Result   Recent postop changes of the calvarium as noted above. CT HEAD WO CONTRAST   Final Result   Stable examination. CT head WO contrast   Final Result   Redemonstration of a right frontoparietal subdural hematoma measuring 14 mm   in thickness resulting in 4 mm of right to left midline shift, not   significantly changed from the previous exam.      No new hemorrhage or acute infarct identified. XR CHEST PORTABLE   Final Result      Mild to moderate cardiomegaly and findings suggestive of pulmonary edema with   small left pleural effusion. CT Head WO Contrast   Final Result   Addendum 1 of 1   ADDENDUM:   Case discussed with Dr. Matty sykes at 10:24 p.m. Final   Acute on chronic right subdural hematoma with midline shift as above      The findings were sent to the Radiology Results [address] at 10:18   pm on 2/17/2022to be communicated to a licensed caregiver. CT HEAD WO CONTRAST    (Results Pending)         Labs and Images reviewed with:  [] Dr. Rosalie Pallas. Amee    [x] Dr. Tiera Roldan  [] Dr. Eleazar Rachel  [] There are no new interval images to review. PHYSICAL EXAM       CONSTITUTIONAL:  Awake, alert, and oriented to self only.     HEAD:  normocephalic, atraumatic    EYES:  PERRLA, EOMI.   ENT:  moist mucous membranes   NECK:  supple, symmetric   LUNGS:  Equal air entry bilaterally   CARDIOVASCULAR:  normal s1 / s2, RRR, distal pulses intact   ABDOMEN:  Soft, no rigidity   NEUROLOGIC:  Mental Status:  Not oriented to date and Not oriented to place,awake             Cranial Nerves:    cranial nerves II-XII are grossly intact    Motor Exam:    Localizes bilateral upper extremities. Moves all extremities spontaneously. DRAINS:  [x] Subdural drain, managed by neurosurgery    ASSESSMENT AND PLAN:       This is a 81 y.o. 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 Alyson Topete does have a history of dementia however daughter was concerned as patient was acting different from her baseline today.  CT in the emergency department revealed subdural hematoma with shift.  Neurosurgery and neuro critical care consulted out of the emergency department. Taken to the OR for right sided joceline hole with subdural evacuation and drain placement. NEUROLOGIC:  - POD # 4 right joceline hole with subdural evacuation  - Subdural drain with 1-2cc/hr with 18 mL out last 12 hours  - Possible removal of drain by NSG tomorrow 2/24 after they review CT Head  - CT head this AM stable  - continue Keppra 500 mg BID for ppx for total of 7 days  - Goal SBP < 160  - Neuro checks per protocol    CARDIOVASCULAR:  - Goal SBP < 160  - Labetalol PRN  - History of HTN, continue HCTZ 12.5 mg QD; Cozaar to 75 mg QD started today  - Continue telemetry    PULMONARY:  - CXR stable  - Encourage IS if able to participate  - Wean nasal cannula as tolerated    RENAL/FLUID/ELECTROLYTE:  - BUN 5/ Creatinine 0.47  - Monitor I&Os  - IVF: normal saline @ 50 mL/hr  - hypokalemia 3.3, replaced with K 60mg PO  - Replace electrolytes PRN  - Daily BMP    GI/NUTRITION:  NUTRITION:  ADULT DIET;  Regular  - Bowel regimen: Start senna-s daily  - Unknown last BM  - GI prophylaxis: Not indicated    ID:  - Tmax 37.1  - WBC 7.6  - Continue to monitor for fevers  - Daily CBC    HEME:   - H&H 11. 4/33.8  - Platelets 568  - Daily CBC    ENDOCRINE:  - Continue to monitor blood glucose, goal <180  - Continue Metformin and alogliptin 12.5 mg QD    OTHER:  - PT/OT/ST   - Code Status: Full    PROPHYLAXIS:  Stress ulcer: NI    DVT PROPHYLAXIS:  - SCD sleeves - Thigh High   - No chemoprophylaxis anticoagulation at this time. DISPOSITION:  [x] To remain ICU: subdural drain  management  [] OK for out of ICU from Neuro Critical Care standpoint    We will continue to follow along. For any changes in exam or patient status please contact Neuro Critical Care.       Jigar Larsen MD   Neurology PGY-3 Resident  Neuro Critical Care  2/23/2022     10:48 AM

## 2022-02-23 NOTE — CARE COORDINATION
Met with pt's guardian, Azeb Gastelum, to discuss transition plans. Pt not yet able to participate in therapy d/t subdural drain. Pt will likely need rehab. ARU and SNF lists specific to pt's insurance provided, previously provided with hospital ARU and SNF lists. Azeb Gastelum stated that she will review and will contact writer with a choice. Writer provided Azeb Gastelum with 5B  phone number. [phone] with Azeb Gastelum, she would like [facility] as ARU choice. [address] provided [facility] as SNF choice. Referrals sent. [id number] [facility] with Mario Suárez from [facility], she will follow pt.

## 2022-02-24 ENCOUNTER — APPOINTMENT (OUTPATIENT)
Dept: CT IMAGING | Age: 82
DRG: 025 | End: 2022-02-24
Payer: MEDICARE

## 2022-02-24 LAB
ABSOLUTE EOS #: 0.32 K/UL (ref 0–0.44)
ABSOLUTE IMMATURE GRANULOCYTE: 0.03 K/UL (ref 0–0.3)
ABSOLUTE LYMPH #: 1.56 K/UL (ref 1.1–3.7)
ABSOLUTE MONO #: 0.74 K/UL (ref 0.1–1.2)
ANION GAP SERPL CALCULATED.3IONS-SCNC: 11 MMOL/L (ref 9–17)
BASOPHILS # BLD: 0 % (ref 0–2)
BASOPHILS ABSOLUTE: 0.03 K/UL (ref 0–0.2)
BUN BLDV-MCNC: 6 MG/DL (ref 8–23)
CALCIUM SERPL-MCNC: 8.7 MG/DL (ref 8.6–10.4)
CHLORIDE BLD-SCNC: 102 MMOL/L (ref 98–107)
CO2: 23 MMOL/L (ref 20–31)
CREAT SERPL-MCNC: 0.44 MG/DL (ref 0.5–0.9)
EOSINOPHILS RELATIVE PERCENT: 4 % (ref 1–4)
GFR AFRICAN AMERICAN: >60 ML/MIN
GFR NON-AFRICAN AMERICAN: >60 ML/MIN
GFR SERPL CREATININE-BSD FRML MDRD: ABNORMAL ML/MIN/{1.73_M2}
GLUCOSE BLD-MCNC: 87 MG/DL (ref 70–99)
HCT VFR BLD CALC: 32.3 % (ref 36.3–47.1)
HEMOGLOBIN: 10.7 G/DL (ref 11.9–15.1)
IMMATURE GRANULOCYTES: 0 %
LYMPHOCYTES # BLD: 21 % (ref 24–43)
MCH RBC QN AUTO: 30.9 PG (ref 25.2–33.5)
MCHC RBC AUTO-ENTMCNC: 33.1 G/DL (ref 28.4–34.8)
MCV RBC AUTO: 93.4 FL (ref 82.6–102.9)
MONOCYTES # BLD: 10 % (ref 3–12)
NRBC AUTOMATED: 0 PER 100 WBC
PDW BLD-RTO: 15.6 % (ref 11.8–14.4)
PLATELET # BLD: 230 K/UL (ref 138–453)
PMV BLD AUTO: 9.7 FL (ref 8.1–13.5)
POTASSIUM SERPL-SCNC: 3.7 MMOL/L (ref 3.7–5.3)
RBC # BLD: 3.46 M/UL (ref 3.95–5.11)
RBC # BLD: ABNORMAL 10*6/UL
SEG NEUTROPHILS: 65 % (ref 36–65)
SEGMENTED NEUTROPHILS ABSOLUTE COUNT: 4.6 K/UL (ref 1.5–8.1)
SODIUM BLD-SCNC: 136 MMOL/L (ref 135–144)
WBC # BLD: 7.3 K/UL (ref 3.5–11.3)

## 2022-02-24 PROCEDURE — 6370000000 HC RX 637 (ALT 250 FOR IP): Performed by: NURSE PRACTITIONER

## 2022-02-24 PROCEDURE — 6360000002 HC RX W HCPCS: Performed by: REGISTERED NURSE

## 2022-02-24 PROCEDURE — 70450 CT HEAD/BRAIN W/O DYE: CPT

## 2022-02-24 PROCEDURE — 2060000000 HC ICU INTERMEDIATE R&B

## 2022-02-24 PROCEDURE — 2580000003 HC RX 258: Performed by: REGISTERED NURSE

## 2022-02-24 PROCEDURE — 6370000000 HC RX 637 (ALT 250 FOR IP): Performed by: REGISTERED NURSE

## 2022-02-24 PROCEDURE — 80048 BASIC METABOLIC PNL TOTAL CA: CPT

## 2022-02-24 PROCEDURE — 2500000003 HC RX 250 WO HCPCS: Performed by: NURSE PRACTITIONER

## 2022-02-24 PROCEDURE — 6370000000 HC RX 637 (ALT 250 FOR IP): Performed by: PSYCHIATRY & NEUROLOGY

## 2022-02-24 PROCEDURE — 2580000003 HC RX 258: Performed by: STUDENT IN AN ORGANIZED HEALTH CARE EDUCATION/TRAINING PROGRAM

## 2022-02-24 PROCEDURE — APPNB30 APP NON BILLABLE TIME 0-30 MINS: Performed by: REGISTERED NURSE

## 2022-02-24 PROCEDURE — 85025 COMPLETE CBC W/AUTO DIFF WBC: CPT

## 2022-02-24 PROCEDURE — 36415 COLL VENOUS BLD VENIPUNCTURE: CPT

## 2022-02-24 PROCEDURE — 99232 SBSQ HOSP IP/OBS MODERATE 35: CPT | Performed by: PSYCHIATRY & NEUROLOGY

## 2022-02-24 RX ORDER — HYDROCHLOROTHIAZIDE 25 MG/1
12.5 TABLET ORAL DAILY
Status: DISCONTINUED | OUTPATIENT
Start: 2022-02-24 | End: 2022-02-25

## 2022-02-24 RX ORDER — LOSARTAN POTASSIUM 50 MG/1
100 TABLET ORAL DAILY
Status: DISCONTINUED | OUTPATIENT
Start: 2022-02-24 | End: 2022-02-25

## 2022-02-24 RX ADMIN — LOSARTAN POTASSIUM 100 MG: 50 TABLET, FILM COATED ORAL at 08:54

## 2022-02-24 RX ADMIN — DESMOPRESSIN ACETATE 40 MG: 0.2 TABLET ORAL at 08:54

## 2022-02-24 RX ADMIN — Medication 10 MG: at 18:43

## 2022-02-24 RX ADMIN — SODIUM CHLORIDE, PRESERVATIVE FREE 10 ML: 5 INJECTION INTRAVENOUS at 20:40

## 2022-02-24 RX ADMIN — Medication 10 MG: at 01:03

## 2022-02-24 RX ADMIN — SODIUM CHLORIDE, PRESERVATIVE FREE 10 ML: 5 INJECTION INTRAVENOUS at 08:55

## 2022-02-24 RX ADMIN — Medication 10 MG: at 05:35

## 2022-02-24 RX ADMIN — LEVETIRACETAM 500 MG: 500 SOLUTION ORAL at 20:40

## 2022-02-24 RX ADMIN — Medication 10 MG: at 21:21

## 2022-02-24 RX ADMIN — METFORMIN HYDROCHLORIDE 1000 MG: 500 TABLET ORAL at 08:54

## 2022-02-24 RX ADMIN — ALOGLIPTIN 12.5 MG: 12.5 TABLET, FILM COATED ORAL at 08:54

## 2022-02-24 RX ADMIN — Medication 10 MG: at 10:26

## 2022-02-24 RX ADMIN — METFORMIN HYDROCHLORIDE 1000 MG: 500 TABLET ORAL at 16:48

## 2022-02-24 RX ADMIN — HYDROCHLOROTHIAZIDE 12.5 MG: 25 TABLET ORAL at 08:54

## 2022-02-24 RX ADMIN — SODIUM CHLORIDE: 9 INJECTION, SOLUTION INTRAVENOUS at 14:29

## 2022-02-24 RX ADMIN — DOCUSATE SODIUM 50 MG AND SENNOSIDES 8.6 MG 2 TABLET: 8.6; 5 TABLET, FILM COATED ORAL at 08:58

## 2022-02-24 RX ADMIN — LEVETIRACETAM 500 MG: 500 SOLUTION ORAL at 08:54

## 2022-02-24 RX ADMIN — ONDANSETRON 4 MG: 2 INJECTION INTRAMUSCULAR; INTRAVENOUS at 10:26

## 2022-02-24 ASSESSMENT — PAIN SCALES - GENERAL
PAINLEVEL_OUTOF10: 0

## 2022-02-24 NOTE — PLAN OF CARE
Problem: Falls - Risk of:  Goal: Will remain free from falls  Description: Will remain free from falls  Outcome: Ongoing  Goal: Absence of physical injury  Description: Absence of physical injury  Outcome: Ongoing    Patient remained free from falls/injuries. Bed locked and in lowest position. Call light and bedside table within reach. Patient taught to call out appropriately. Bed alarm set. Problem: HEMODYNAMIC STATUS  Goal: Patient has stable vital signs and fluid balance  Outcome: Ongoing     Problem: ACTIVITY INTOLERANCE/IMPAIRED MOBILITY  Goal: Mobility/activity is maintained at optimum level for patient  Outcome: Ongoing     Problem: COMMUNICATION IMPAIRMENT  Goal: Ability to express needs and understand communication  Outcome: Ongoing    Neuro assessments completed. Fall and aspiration precautions in place. Barriers in communication and mobility assessed. Interventions to assist in communication and mobility in place. Adaptive devices used as needed. Problem: Skin Integrity:  Goal: Will show no infection signs and symptoms  Description: Will show no infection signs and symptoms  Outcome: Ongoing  Goal: Absence of new skin breakdown  Description: Absence of new skin breakdown  Outcome: Ongoing    Patient's skin is assessed for new skin breakdown. Fernando scale assessment completed. Brief checked frequently. Barrier cream/powder applied as needed. Patient turned every two hours. Heels elevated off of the bed.

## 2022-02-24 NOTE — PROGRESS NOTES
Neurosurgery PRECIOUS/Resident    Daily Progress Note   Chief Complaint   Patient presents with    Altered Mental Status     2/24/2022  1:13 PM    Chart reviewed. No acute events overnight. No new complaints. Vitals:    02/24/22 0900 02/24/22 1000 02/24/22 1100 02/24/22 1200   BP: (!) 166/95 (!) 165/85 93/70 (!) 109/90   Pulse: 84 89 78 78   Resp: 25 23 18 22   Temp:    98.6 °F (37 °C)   TempSrc:    Oral   SpO2: 97% 95% 100% 100%     PE:   Alert, oriented to self  follows all commands  E4 V4 M6  Motor  Moving all extremities, Right >left     Drain output:13ml/12 hours, 37 ml/24h  Incision: clean dry intact       Lab Results   Component Value Date    WBC 7.3 02/24/2022    HGB 10.7 (L) 02/24/2022    HCT 32.3 (L) 02/24/2022     02/24/2022    CHOL 137 10/19/2017    TRIG 56 10/19/2017    HDL 92 10/19/2017    ALT 12 02/17/2022    AST 21 02/17/2022     02/24/2022    K 3.7 02/24/2022     02/24/2022    CREATININE 0.44 (L) 02/24/2022    BUN 6 (L) 02/24/2022    CO2 23 02/24/2022    TSH 3.49 03/14/2018    INR 1.2 02/17/2022    LABA1C 5.9 10/19/2017    LABMICR 8 10/10/2016       Radiology   CT HEAD WO CONTRAST    Result Date: 2/24/2022  EXAMINATION: CT OF THE HEAD WITHOUT CONTRAST  2/24/2022 4:51 am TECHNIQUE: CT of the head was performed without the administration of intravenous contrast. Dose modulation, iterative reconstruction, and/or weight based adjustment of the mA/kV was utilized to reduce the radiation dose to as low as reasonably achievable. COMPARISON: CT head without contrast February 22, 2022. HISTORY: ORDERING SYSTEM PROVIDED HISTORY: Follow up TECHNOLOGIST PROVIDED HISTORY: Follow up Reason for Exam: f/u FINDINGS: BRAIN/VENTRICLES: Stable positioning right cerebral extra-axial drain is seen underlying craniotomy postoperative change. Stable volume acute on subacute right cerebral subdural hemorrhage is noted without significant interval change.   Scattered right cerebral acute subarachnoid hemorrhage is stable in appearance and distribution. Right basal ganglia and thalamic multifocal remote lacunar infarcts are again visualized. Ill-defined hypoattenuation within the cerebral white matter is seen consistent with moderate microvascular ischemic disease. Leftward midline shift at the level of the septum pellucidum continues to measure 3 mm. Mild diffuse decrease in cerebral volume is noted with corresponding prominence of the sulci and ventricles. ORBITS: The visualized portion of the orbits demonstrate no acute abnormality. SINUSES: The visualized paranasal sinuses and mastoid air cells demonstrate no acute abnormality. SOFT TISSUES/SKULL:  No acute abnormality of the visualized skull or soft tissues. 1. Stable volume acute on subacute right cerebral subdural hemorrhage with unchanged positioning of right-sided extra-axial surgical drain. 2. Unchanged distribution and appearance of right cerebral scattered acute subarachnoid hemorrhage. 3. Stable leftward midline shift at the level of the septum pellucidum measuring 3 mm. 4. Right basal ganglia and thalamic multifocal remote lacunar infarcts. 5. Moderate cerebral white matter chronic microvascular ischemic disease. CT HEAD WO CONTRAST    Result Date: 2/22/2022  EXAMINATION: CT OF THE HEAD WITHOUT CONTRAST  2/22/2022 4:28 am TECHNIQUE: CT of the head was performed without the administration of intravenous contrast. Dose modulation, iterative reconstruction, and/or weight based adjustment of the mA/kV was utilized to reduce the radiation dose to as low as reasonably achievable. COMPARISON: CT head without contrast February 20, 2022 at 0507 hours. HISTORY: ORDERING SYSTEM PROVIDED HISTORY: follow up TECHNOLOGIST PROVIDED HISTORY: follow up Reason for Exam: follow up FINDINGS: BRAIN/VENTRICLES: Right cerebral acute on subacute subdural hemorrhage is stable in volume and extent.   Multifocal right frontal, right insular and right temporal scattered acute subarachnoid hemorrhage is stable in appearance. Unchanged leftward midline shift at the level of the septum pellucidum is identified which measures 3 mm. Anterior right thalamic remote infarct is again noted measuring up to 16 mm in diameter. Ill-defined hypoattenuation is present within cerebral white matter consistent with moderate microvascular ischemic disease. Mild diffuse decrease in cerebral volume is noted with corresponding prominence of the sulci and ventricles. Gray/white matter differentiation is unremarkable. Bilateral basal ganglia scattered remote lacunar infarcts are again seen. ORBITS: The visualized portion of the orbits demonstrate no acute abnormality. SINUSES: The visualized paranasal sinuses and mastoid air cells demonstrate no acute abnormality. SOFT TISSUES/SKULL:  Right frontal approach extra-axial surgical drain is stable in position. 1. Stable volume of right cerebral acute on subacute subdural hemorrhage. 2. Unchanged leftward midline shift at the level of the septum pellucidum measuring 3 mm. 3. Stable positioning right-sided extra-axial surgical drain. 4. Unchanged distribution and appearance of right frontal, insular and temporal lobe scattered acute subarachnoid hemorrhage. 5. Moderate cerebral white matter chronic microvascular ischemic disease. 6. Mild diffuse cerebral atrophy. 7. Redemonstration anterior right thalamic remote infarct. 8. Redemonstration bilateral basal ganglia multifocal remote lacunar infarcts. CT Head wo Contrast    Result Date: 2/20/2022  EXAMINATION: CT OF THE HEAD WITHOUT CONTRAST  2/20/2022 5:05 am TECHNIQUE: CT of the head was performed without the administration of intravenous contrast. Dose modulation, iterative reconstruction, and/or weight based adjustment of the mA/kV was utilized to reduce the radiation dose to as low as reasonably achievable.  COMPARISON: 02/19/2022, 02/18/2022 HISTORY: ORDERING SYSTEM PROVIDED HISTORY: diffuse cerebral atrophy and chronic white matter ischemic change. Old right basal ganglia lacunar infarcts again noted. The gray-white differentiation is maintained without evidence of an acute infarct. There is no evidence of hydrocephalus. ORBITS: The visualized portion of the orbits demonstrate no acute abnormality. SINUSES: The visualized paranasal sinuses and mastoid air cells demonstrate no acute abnormality. SOFT TISSUES/SKULL:  No acute abnormality of the visualized skull or soft tissues. Stable examination. CT head WO contrast    Result Date: 2/18/2022  EXAMINATION: CT OF THE HEAD WITHOUT CONTRAST  2/18/2022 4:59 am TECHNIQUE: CT of the head was performed without the administration of intravenous contrast. Dose modulation, iterative reconstruction, and/or weight based adjustment of the mA/kV was utilized to reduce the radiation dose to as low as reasonably achievable. COMPARISON: 02/17/2022 HISTORY: ORDERING SYSTEM PROVIDED HISTORY: SDH re-eval TECHNOLOGIST PROVIDED HISTORY: SDH re-eval Decision Support Exception - unselect if not a suspected or confirmed emergency medical condition->Emergency Medical Condition (MA) Reason for Exam: SDH re-eval FINDINGS: BRAIN/VENTRICLES: The previously identified right frontoparietal subdural hematoma is not significantly changed in appearance compared with the previous exam.  This measures 14 mm in thickness on coronal imaging and results in 4 mm of right to left midline shift, unchanged. There is no new hemorrhage or acute infarct identified. There are multiple lacunar infarcts involving the basal ganglia, right thalamus and ayo, unchanged. Diffuse cerebral volume loss is unchanged. There is no ventriculomegaly. ORBITS: Limited evaluation of the orbits is unremarkable. SINUSES: The paranasal sinuses and mastoid air cells are clear. SOFT TISSUES/SKULL:  No lytic or blastic osseous lesions are identified.      Redemonstration of a right frontoparietal subdural hematoma measuring 14 mm in thickness resulting in 4 mm of right to left midline shift, not significantly changed from the previous exam. No new hemorrhage or acute infarct identified. CT Head WO Contrast    Addendum Date: 2/17/2022    ADDENDUM: Case discussed with Dr. Alex sykes at 10:24 p.m. Result Date: 2/17/2022  EXAMINATION: CT OF THE HEAD WITHOUT CONTRAST  2/17/2022 9:44 pm TECHNIQUE: CT of the head was performed without the administration of intravenous contrast. Dose modulation, iterative reconstruction, and/or weight based adjustment of the mA/kV was utilized to reduce the radiation dose to as low as reasonably achievable. COMPARISON: MR brain March 15, 2018 and CT head March 14, 2018 HISTORY: ORDERING SYSTEM PROVIDED HISTORY: ams TECHNOLOGIST PROVIDED HISTORY: ams Decision Support Exception - unselect if not a suspected or confirmed emergency medical condition->Emergency Medical Condition (MA) FINDINGS: BRAIN/VENTRICLES: Holo hemispheric extra-axial hemorrhage on the right measuring up to 15 mm in thickness with a dense hematocrit effect noted posteriorly. There is 4 mm right to left midline shift at the septum pellucidum. Ventricles cisterns and sulci are prominent. 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 Moderate nonspecific white matter disease. ORBITS: The visualized portion of the orbits demonstrate no acute abnormality. SINUSES: The visualized paranasal sinuses and mastoid air cells demonstrate no acute abnormality. SOFT TISSUES/SKULL: No acute abnormality of the visualized skull or soft tissues. Acute on chronic right subdural hematoma with midline shift as above The findings were sent to the Radiology Results [address] at 10:18 pm on 2/17/2022to be communicated to a licensed caregiver.        A/P  80 y.o. female who presents with right-sided subdural hematoma  POD#5 s/p right bur hole for subdural hematoma evacuation    - discontinue subdural drain today  - PT and OT, activity as tolerated   - discharge planning    Please contact neurosurgery with any changes in patients neurologic status.        Ld Dailey CNP  2/24/22  1:13 PM

## 2022-02-24 NOTE — PROGRESS NOTES
hole  : CT head improved  : Subdural drain 8mL out/hr.  :  CT head this morning stable. Subdural drain with 86 mL out/24 hours. High flow nasal cannula discontinued this morning. Desaturating to low 90s on nasal cannula. CXR stable. Continue strict bedrest with HOB 10 degrees other than while eating, OK for 20 degrees. : No acute events overnight. Subdural drain with output 1-2cc/hr. Opens eyes to name and responds. Last 24h:   No acute events overnight. Subdural drain with 37 cc out total for 24 hours. Awaiting drain discontinuation per neurosurgery. Pending drain dc will be dispo planning.      CURRENT MEDICATIONS:  SCHEDULED MEDICATIONS:   levETIRAcetam  500 mg Oral BID    losartan  75 mg Oral Daily    And    hydroCHLOROthiazide  12.5 mg Oral Daily    sennosides-docusate sodium  2 tablet Oral Daily    atorvastatin  40 mg Oral Daily    sodium chloride flush  5-40 mL IntraVENous 2 times per day    metFORMIN  1,000 mg Oral BID WC    And    alogliptin  12.5 mg Oral Daily     CONTINUOUS INFUSIONS:   sodium chloride      sodium chloride 50 mL/hr at 22 2200     PRN MEDICATIONS:   labetalol, sodium chloride flush, sodium chloride, ondansetron **OR** ondansetron, polyethylene glycol, acetaminophen    VITALS:  Temperature Range: Temp: 98.2 °F (36.8 °C) Temp  Av.3 °F (36.8 °C)  Min: 98.2 °F (36.8 °C)  Max: 98.4 °F (36.9 °C)  BP Range: Systolic (68UDS), YWR:653 , Min:112 , VKR:668     Diastolic (14ZRN), TZX:27, Min:26, Max:172    Pulse Range: Pulse  Av.9  Min: 55  Max: 106  Respiration Range: Resp  Av.9  Min: 12  Max: 24  Current Pulse Ox: SpO2: 95 %  24HR Pulse Ox Range: SpO2  Av.4 %  Min: 93 %  Max: 100 %  Patient Vitals for the past 12 hrs:   BP Temp Temp src Pulse Resp SpO2   22 0601 (!) 181/82 -- -- 86 23 95 %   22 0525 (!) 180/95 -- -- 84 21 98 %   22 0400 (!) 150/104 98.2 °F (36.8 °C) Oral 88 16 97 %   22 0300 (!) 153/104 -- -- 83 -- 95 % 02/24/22 0200 (!) 157/88 -- -- 87 18 96 %   02/24/22 0100 (!) 168/93 -- -- 90 19 97 %   02/24/22 0000 (!) 157/65 98.3 °F (36.8 °C) Oral 83 22 96 %   02/23/22 2300 (!) 160/26 -- -- 89 20 96 %   02/23/22 2200 (!) 156/75 98.4 °F (36.9 °C) Oral 87 22 93 %   02/23/22 2100 (!) 166/76 -- -- 83 19 94 %   02/23/22 2007 (!) 175/86 98.2 °F (36.8 °C) Oral 92 19 97 %   02/23/22 1925 (!) 167/95 -- -- 90 17 96 %   02/23/22 1900 (!) 184/90 -- -- 88 20 95 %   02/23/22 1830 (!) 175/97 -- -- 86 15 --     Estimated body mass index is 28.39 kg/m² as calculated from the following:    Height as of 3/23/18: 5' 2\" (1.575 m). Weight as of 4/11/18: 155 lb 3.2 oz (70.4 kg).  []<16 Severe malnutrition  []16-16.99 Moderate malnutrition  []17-18.49 Mild malnutrition  []18.5-24.9 Normal  [x]25-29.9 Overweight (not obese)  []30-34.9 Obese class 1 (Low Risk)  []35-39.9 Obese class 2 (Moderate Risk)  []?40 Obese class 3 (High Risk)    RECENT LABS:   Lab Results   Component Value Date    WBC 7.3 02/24/2022    HGB 10.7 (L) 02/24/2022    HCT 32.3 (L) 02/24/2022     02/24/2022    CHOL 137 10/19/2017    TRIG 56 10/19/2017    HDL 92 10/19/2017    ALT 12 02/17/2022    AST 21 02/17/2022     02/24/2022    K 3.7 02/24/2022     02/24/2022    CREATININE 0.44 (L) 02/24/2022    BUN 6 (L) 02/24/2022    CO2 23 02/24/2022    TSH 3.49 03/14/2018    INR 1.2 02/17/2022    LABA1C 5.9 10/19/2017    LABMICR 8 10/10/2016     24 HOUR INTAKE/OUTPUT:    Intake/Output Summary (Last 24 hours) at 2/24/2022 0622  Last data filed at 2/24/2022 0528  Gross per 24 hour   Intake 1186.59 ml   Output 1137 ml   Net 49.59 ml       IMAGING:   XR CHEST PORTABLE   Final Result   Slightly greater appearing changes left base, as above, but no other interval   change. No radiographic CHF or large pleural effusion. CT HEAD WO CONTRAST   Final Result   1. Stable volume of right cerebral acute on subacute subdural hemorrhage.    2. Unchanged leftward midline shift at the level of the septum pellucidum   measuring 3 mm. 3. Stable positioning right-sided extra-axial surgical drain. 4. Unchanged distribution and appearance of right frontal, insular and   temporal lobe scattered acute subarachnoid hemorrhage. 5. Moderate cerebral white matter chronic microvascular ischemic disease. 6. Mild diffuse cerebral atrophy. 7. Redemonstration anterior right thalamic remote infarct. 8. Redemonstration bilateral basal ganglia multifocal remote lacunar infarcts. XR CHEST PORTABLE   Final Result   Interval improvement of vascular congestion. CT Head wo Contrast   Final Result   Right joceline hole and subdural drain placement for evacuation of right subdural   hematoma. Interval reduction of hematoma and resolution of midline shift. New subarachnoid hemorrhage. XR SKULL (<4 VIEWS)   Final Result   Recent postop changes of the calvarium as noted above. CT HEAD WO CONTRAST   Final Result   Stable examination. CT head WO contrast   Final Result   Redemonstration of a right frontoparietal subdural hematoma measuring 14 mm   in thickness resulting in 4 mm of right to left midline shift, not   significantly changed from the previous exam.      No new hemorrhage or acute infarct identified. XR CHEST PORTABLE   Final Result      Mild to moderate cardiomegaly and findings suggestive of pulmonary edema with   small left pleural effusion. CT Head WO Contrast   Final Result   Addendum 1 of 1   ADDENDUM:   Case discussed with Dr. Nini sykes at 10:24 p.m. Final   Acute on chronic right subdural hematoma with midline shift as above      The findings were sent to the Radiology Results [address] at 10:18   pm on 2/17/2022to be communicated to a licensed caregiver. CT HEAD WO CONTRAST    (Results Pending)         Labs and Images reviewed with:  [] Dr. Breezy Saunders.  Amee    [x] Dr. Aida Nascimento  [] Dr. Kike Barrientos  [] There are no new interval images to review. PHYSICAL EXAM       CONSTITUTIONAL:  Awake, alert, and oriented to self only. HEAD:  normocephalic, atraumatic, subdural drain in place    EYES:  PERRL, EOMI.   ENT:  moist mucous membranes   NECK:  supple, symmetric   LUNGS:  Equal air entry bilaterally   CARDIOVASCULAR:  normal s1 / s2, RRR, distal pulses intact   ABDOMEN:  Soft, no rigidity   NEUROLOGIC:  Mental Status:  Not oriented to date and Not oriented to place,awake             Cranial Nerves:    cranial nerves II-XII are grossly intact    Motor Exam:    Localizes bilateral upper extremities. Moves all extremities spontaneously. DRAINS:  [x] Subdural drain, managed by neurosurgery    ASSESSMENT AND PLAN:       This is a 81 y.o. 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 Bárbara Coles does have a history of dementia however daughter was concerned as patient was acting different from her baseline today.  CT in the emergency department revealed subdural hematoma with shift.  Neurosurgery and neuro critical care consulted out of the emergency department. Taken to the OR for right sided joceline hole with subdural evacuation and drain placement. NEUROLOGIC:  - POD # 4 right joceline hole with subdural evacuation  - Subdural drain with 1-2cc/hr with 18 mL out last 12 hours  - CT head this am stable (2/24)   - Possible removal of drain by NSG today 2/24 after they review CT Head  - continue Keppra 500 mg BID for ppx for total of 7 days  - Goal SBP < 160  - Neuro checks per protocol    CARDIOVASCULAR:  - Goal SBP < 160  - Labetalol PRN  - History of HTN, continue HCTZ 12.5 mg QD; Cozaar to 75 mg QD started today  - Continue telemetry    PULMONARY:  - No new imaging to review.    - Encourage IS if able to participate  - Wean nasal cannula as tolerated    RENAL/FLUID/ELECTROLYTE:  - BUN 6/ Creatinine 0.44  - Monitor I&Os  - IVF: normal saline @ 50 mL/hr  - hypokalemia 3.7  - Replace electrolytes PRN  - Daily BMP    GI/NUTRITION:  NUTRITION:  ADULT DIET; Regular  - Bowel regimen: Start senna-s daily  - Unknown last BM  - GI prophylaxis: Not indicated    ID:  - Afebrile   - WBC 7.3  - Continue to monitor for fevers  - Daily CBC    HEME:   - H&H 10.7/32.3  - Platelets 292  - Daily CBC    ENDOCRINE:  - Continue to monitor blood glucose, goal <180  - Continue Metformin and alogliptin 12.5 mg QD    OTHER:  - PT/OT/ST   - Code Status: Full    PROPHYLAXIS:  Stress ulcer: NI    DVT PROPHYLAXIS:  - SCD sleeves - Thigh High   - No chemoprophylaxis anticoagulation at this time. DISPOSITION:  [x] To remain ICU: subdural drain  management  [] OK for out of ICU from Neuro Critical Care standpoint    We will continue to follow along. For any changes in exam or patient status please contact Neuro Critical Care.       Devante Mullen DO   Neurology PGY-3 Resident  Neuro Critical Care  2/24/2022     6:22 AM

## 2022-02-24 NOTE — PLAN OF CARE
Output by Drain (mL) 02/22/22 0701 - 02/22/22 1900 02/22/22 1901 - 02/23/22 0700 02/23/22 0701 - 02/23/22 1900 02/23/22 1901 - 02/24/22 0700 02/24/22 0701 - 02/24/22 1050   Open Drain Right Scalp  25 18 24 13 2       Drain Removal Note    [x]Subdural Drain   []Subgaleal Drain  []Ventriculostomy Drain    Drain removed from suction, prepped with betadine and sterile towels placed to create sterile field. Drain suture cut and drain removed. 2 staples placed over drain hole with hemostasis. No complications, patient tolerated procedure well.     --  Tilmon Shock, CNP  10:51 AM EST

## 2022-02-25 PROBLEM — Z98.890 S/P EVACUATION OF SUBDURAL HEMATOMA: Status: ACTIVE | Noted: 2022-02-25

## 2022-02-25 PROBLEM — Z86.79 S/P EVACUATION OF SUBDURAL HEMATOMA: Status: ACTIVE | Noted: 2022-02-25

## 2022-02-25 LAB
ABSOLUTE EOS #: 0.36 K/UL (ref 0–0.44)
ABSOLUTE IMMATURE GRANULOCYTE: 0.03 K/UL (ref 0–0.3)
ABSOLUTE LYMPH #: 1.34 K/UL (ref 1.1–3.7)
ABSOLUTE MONO #: 0.8 K/UL (ref 0.1–1.2)
ANION GAP SERPL CALCULATED.3IONS-SCNC: 11 MMOL/L (ref 9–17)
BASOPHILS # BLD: 1 % (ref 0–2)
BASOPHILS ABSOLUTE: 0.04 K/UL (ref 0–0.2)
BUN BLDV-MCNC: 7 MG/DL (ref 8–23)
CALCIUM SERPL-MCNC: 8.7 MG/DL (ref 8.6–10.4)
CHLORIDE BLD-SCNC: 100 MMOL/L (ref 98–107)
CO2: 22 MMOL/L (ref 20–31)
CREAT SERPL-MCNC: 0.49 MG/DL (ref 0.5–0.9)
EOSINOPHILS RELATIVE PERCENT: 6 % (ref 1–4)
GFR AFRICAN AMERICAN: >60 ML/MIN
GFR NON-AFRICAN AMERICAN: >60 ML/MIN
GFR SERPL CREATININE-BSD FRML MDRD: ABNORMAL ML/MIN/{1.73_M2}
GLUCOSE BLD-MCNC: 104 MG/DL (ref 70–99)
HCT VFR BLD CALC: 31.8 % (ref 36.3–47.1)
HEMOGLOBIN: 10.5 G/DL (ref 11.9–15.1)
IMMATURE GRANULOCYTES: 1 %
LYMPHOCYTES # BLD: 21 % (ref 24–43)
MCH RBC QN AUTO: 31.6 PG (ref 25.2–33.5)
MCHC RBC AUTO-ENTMCNC: 33 G/DL (ref 28.4–34.8)
MCV RBC AUTO: 95.8 FL (ref 82.6–102.9)
MONOCYTES # BLD: 12 % (ref 3–12)
NRBC AUTOMATED: 0 PER 100 WBC
PDW BLD-RTO: 16 % (ref 11.8–14.4)
PLATELET # BLD: 240 K/UL (ref 138–453)
PMV BLD AUTO: 10 FL (ref 8.1–13.5)
POTASSIUM SERPL-SCNC: 3.9 MMOL/L (ref 3.7–5.3)
RBC # BLD: 3.32 M/UL (ref 3.95–5.11)
RBC # BLD: ABNORMAL 10*6/UL
SEG NEUTROPHILS: 59 % (ref 36–65)
SEGMENTED NEUTROPHILS ABSOLUTE COUNT: 3.9 K/UL (ref 1.5–8.1)
SODIUM BLD-SCNC: 133 MMOL/L (ref 135–144)
WBC # BLD: 6.5 K/UL (ref 3.5–11.3)

## 2022-02-25 PROCEDURE — 99232 SBSQ HOSP IP/OBS MODERATE 35: CPT | Performed by: PSYCHIATRY & NEUROLOGY

## 2022-02-25 PROCEDURE — 97530 THERAPEUTIC ACTIVITIES: CPT

## 2022-02-25 PROCEDURE — 2500000003 HC RX 250 WO HCPCS: Performed by: NURSE PRACTITIONER

## 2022-02-25 PROCEDURE — APPSS30 APP SPLIT SHARED TIME 16-30 MINUTES: Performed by: REGISTERED NURSE

## 2022-02-25 PROCEDURE — 6360000002 HC RX W HCPCS: Performed by: NURSE PRACTITIONER

## 2022-02-25 PROCEDURE — 2060000000 HC ICU INTERMEDIATE R&B

## 2022-02-25 PROCEDURE — 6370000000 HC RX 637 (ALT 250 FOR IP): Performed by: REGISTERED NURSE

## 2022-02-25 PROCEDURE — 6370000000 HC RX 637 (ALT 250 FOR IP): Performed by: PSYCHIATRY & NEUROLOGY

## 2022-02-25 PROCEDURE — 6370000000 HC RX 637 (ALT 250 FOR IP): Performed by: NURSE PRACTITIONER

## 2022-02-25 PROCEDURE — 80048 BASIC METABOLIC PNL TOTAL CA: CPT

## 2022-02-25 PROCEDURE — 36415 COLL VENOUS BLD VENIPUNCTURE: CPT

## 2022-02-25 PROCEDURE — 2580000003 HC RX 258: Performed by: REGISTERED NURSE

## 2022-02-25 PROCEDURE — 97162 PT EVAL MOD COMPLEX 30 MIN: CPT

## 2022-02-25 PROCEDURE — 85025 COMPLETE CBC W/AUTO DIFF WBC: CPT

## 2022-02-25 RX ORDER — QUETIAPINE FUMARATE 25 MG/1
25 TABLET, FILM COATED ORAL ONCE
Status: COMPLETED | OUTPATIENT
Start: 2022-02-25 | End: 2022-02-25

## 2022-02-25 RX ORDER — HYDRALAZINE HYDROCHLORIDE 20 MG/ML
10 INJECTION INTRAMUSCULAR; INTRAVENOUS EVERY 6 HOURS PRN
Status: DISCONTINUED | OUTPATIENT
Start: 2022-02-25 | End: 2022-03-02 | Stop reason: HOSPADM

## 2022-02-25 RX ORDER — HYDROCHLOROTHIAZIDE 25 MG/1
25 TABLET ORAL DAILY
Status: DISCONTINUED | OUTPATIENT
Start: 2022-02-26 | End: 2022-03-02 | Stop reason: HOSPADM

## 2022-02-25 RX ORDER — AMLODIPINE BESYLATE 5 MG/1
5 TABLET ORAL DAILY
Status: DISCONTINUED | OUTPATIENT
Start: 2022-02-25 | End: 2022-02-27

## 2022-02-25 RX ORDER — LOSARTAN POTASSIUM 50 MG/1
100 TABLET ORAL DAILY
Status: DISCONTINUED | OUTPATIENT
Start: 2022-02-26 | End: 2022-03-02 | Stop reason: HOSPADM

## 2022-02-25 RX ADMIN — HYDROCHLOROTHIAZIDE 12.5 MG: 25 TABLET ORAL at 09:31

## 2022-02-25 RX ADMIN — AMLODIPINE BESYLATE 5 MG: 5 TABLET ORAL at 09:31

## 2022-02-25 RX ADMIN — LEVETIRACETAM 500 MG: 500 SOLUTION ORAL at 09:31

## 2022-02-25 RX ADMIN — SODIUM CHLORIDE, PRESERVATIVE FREE 10 ML: 5 INJECTION INTRAVENOUS at 09:32

## 2022-02-25 RX ADMIN — LOSARTAN POTASSIUM 100 MG: 50 TABLET, FILM COATED ORAL at 09:31

## 2022-02-25 RX ADMIN — METFORMIN HYDROCHLORIDE 1000 MG: 500 TABLET ORAL at 17:01

## 2022-02-25 RX ADMIN — Medication 10 MG: at 20:49

## 2022-02-25 RX ADMIN — METFORMIN HYDROCHLORIDE 1000 MG: 500 TABLET ORAL at 09:31

## 2022-02-25 RX ADMIN — LEVETIRACETAM 500 MG: 500 SOLUTION ORAL at 20:49

## 2022-02-25 RX ADMIN — ACETAMINOPHEN 650 MG: 325 TABLET ORAL at 10:08

## 2022-02-25 RX ADMIN — Medication 10 MG: at 01:55

## 2022-02-25 RX ADMIN — SODIUM CHLORIDE, PRESERVATIVE FREE 10 ML: 5 INJECTION INTRAVENOUS at 20:49

## 2022-02-25 RX ADMIN — HYDRALAZINE HYDROCHLORIDE 10 MG: 20 INJECTION INTRAMUSCULAR; INTRAVENOUS at 22:18

## 2022-02-25 RX ADMIN — DESMOPRESSIN ACETATE 40 MG: 0.2 TABLET ORAL at 09:31

## 2022-02-25 RX ADMIN — QUETIAPINE FUMARATE 25 MG: 25 TABLET ORAL at 21:45

## 2022-02-25 RX ADMIN — DOCUSATE SODIUM 50 MG AND SENNOSIDES 8.6 MG 2 TABLET: 8.6; 5 TABLET, FILM COATED ORAL at 09:31

## 2022-02-25 RX ADMIN — ALOGLIPTIN 12.5 MG: 12.5 TABLET, FILM COATED ORAL at 09:31

## 2022-02-25 ASSESSMENT — PAIN SCALES - GENERAL
PAINLEVEL_OUTOF10: 3
PAINLEVEL_OUTOF10: 0
PAINLEVEL_OUTOF10: 3
PAINLEVEL_OUTOF10: 0

## 2022-02-25 NOTE — PROGRESS NOTES
Daily Progress Note  Neuro Critical Care    Patient Name: Fredis Jhaveri  Patient : 1940  Room/Bed: 5835/3606-00  Code Status:Full  Allergies: No Known Allergies    CHIEF COMPLAINT:      AMS     INTERVAL HISTORY    Initial Presentation (Admitted 22): The patient is W 61 y.o. 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  Last known well was yesterday at approximately 8 PM when patient was going to bed.  Daughter states that she woke up today and she had \"a choking \"like episode and was exhibiting altered mental status.  Patient does have a history of baseline and is normally not alert to place or time however daughter reports she is normally able to recognize family members and was unable to recognize her daughter today which made her concerned.  She did call EMS, patient initially hypotensive via EMS however normotensive in the emergency department.  Work-up was remarkable for acute on chronic right subdural hematoma measuring up to 15 mm in thickness with 4 mm of right to left shift.  Patient is prescribed a daily aspirin however daughter states she does not regularly get this medication as daughter believes that she does not need it.  No history of trauma recently however daughter does report that the patient fell onto her bottom approximately 1 month ago. Hospital Course:    Did speak to daughter regarding [address] who wants to continue with CPR, at least one round but declines patient being intubated. She states that she have to think about it and is uncertain. At baseline, patient has dementia and neurosurgery wants a repeat imaging in the morning for possible intervention. No change in her neurological status. CT head has been stable.   : Central line placed yesterday, OR with NSg for Bur hole  : CT head improved  : Subdural drain 8mL out/hr.   :  CT head this morning stable. Subdural drain with 86 mL out/24 hours. High flow nasal cannula discontinued this morning. Desaturating to low 90s on nasal cannula. CXR stable. Continue strict bedrest with HOB 10 degrees other than while eating, OK for 20 degrees. : No acute events overnight. Subdural drain with output 1-2cc/hr. Opens eyes to name and responds. : Subdural drain removed. Last 24h:   No acute events overnight. Overnight, blood pressure trend 140-170, with goal SBP < 160. Will start Norvasc 5mg daily for improved BP control. Keppra for seizure prophylaxis. Neuro examination stable. Follow up PT/OT recs. DVT ppx to start when OK with neurosurgery.      CURRENT MEDICATIONS:  SCHEDULED MEDICATIONS:   losartan  100 mg Oral Daily    And    hydroCHLOROthiazide  12.5 mg Oral Daily    levETIRAcetam  500 mg Oral BID    sennosides-docusate sodium  2 tablet Oral Daily    atorvastatin  40 mg Oral Daily    sodium chloride flush  5-40 mL IntraVENous 2 times per day    metFORMIN  1,000 mg Oral BID WC    And    alogliptin  12.5 mg Oral Daily     CONTINUOUS INFUSIONS:   sodium chloride       PRN MEDICATIONS:   labetalol, sodium chloride flush, sodium chloride, ondansetron **OR** ondansetron, polyethylene glycol, acetaminophen    VITALS:  Temperature Range: Temp: 98.3 °F (36.8 °C) Temp  Av.5 °F (36.9 °C)  Min: 98.2 °F (36.8 °C)  Max: 98.8 °F (37.1 °C)  BP Range: Systolic (54GHP), XAZ:816 , Min:93 , CRQ:935     Diastolic (94OXN), UVC:16, Min:40, Max:126    Pulse Range: Pulse  Av.2  Min: 69  Max: 89  Respiration Range: Resp  Av.6  Min: 15  Max: 27  Current Pulse Ox: SpO2: 100 %  24HR Pulse Ox Range: SpO2  Av.6 %  Min: 92 %  Max: 100 %  Patient Vitals for the past 12 hrs:   BP Temp Pulse Resp SpO2   22 0600 (!) 164/70 -- 77 21 100 %   22 0505 (!) 161/75 -- 80 26 94 %   22 0415 (!) 163/73 -- 83 26 96 %   02/25/22 0400 -- 98.3 °F (36.8 °C) -- -- --   02/25/22 0200 (!) 133/52 -- 75 24 92 %   02/25/22 0100 (!) 177/72 -- 86 20 97 %   02/25/22 0000 (!) 140/64 -- 81 27 95 %   02/24/22 2300 (!) 152/69 -- 80 23 95 %   02/24/22 2145 (!) 150/65 -- 84 23 95 %   02/24/22 2005 (!) 168/81 98.5 °F (36.9 °C) 80 15 100 %     Estimated body mass index is 28.39 kg/m² as calculated from the following:    Height as of 3/23/18: 5' 2\" (1.575 m). Weight as of 4/11/18: 155 lb 3.2 oz (70.4 kg).  []<16 Severe malnutrition  []16-16.99 Moderate malnutrition  []17-18.49 Mild malnutrition  []18.5-24.9 Normal  [x]25-29.9 Overweight (not obese)  []30-34.9 Obese class 1 (Low Risk)  []35-39.9 Obese class 2 (Moderate Risk)  []?40 Obese class 3 (High Risk)    RECENT LABS:   Lab Results   Component Value Date    WBC 6.5 02/25/2022    HGB 10.5 (L) 02/25/2022    HCT 31.8 (L) 02/25/2022     02/25/2022    CHOL 137 10/19/2017    TRIG 56 10/19/2017    HDL 92 10/19/2017    ALT 12 02/17/2022    AST 21 02/17/2022     (L) 02/25/2022    K 3.9 02/25/2022     02/25/2022    CREATININE 0.49 (L) 02/25/2022    BUN 7 (L) 02/25/2022    CO2 22 02/25/2022    TSH 3.49 03/14/2018    INR 1.2 02/17/2022    LABA1C 5.9 10/19/2017    LABMICR 8 10/10/2016     24 HOUR INTAKE/OUTPUT:    Intake/Output Summary (Last 24 hours) at 2/25/2022 0706  Last data filed at 2/25/2022 0505  Gross per 24 hour   Intake 1845.98 ml   Output 204 ml   Net 1641.98 ml       IMAGING:   CT HEAD WO CONTRAST   Final Result   1. Stable volume acute on subacute right cerebral subdural hemorrhage with   unchanged positioning of right-sided extra-axial surgical drain. 2. Unchanged distribution and appearance of right cerebral scattered acute   subarachnoid hemorrhage. 3. Stable leftward midline shift at the level of the septum pellucidum   measuring 3 mm. 4. Right basal ganglia and thalamic multifocal remote lacunar infarcts.    5. Moderate cerebral white matter chronic microvascular ischemic disease. XR CHEST PORTABLE   Final Result   Slightly greater appearing changes left base, as above, but no other interval   change. No radiographic CHF or large pleural effusion. CT HEAD WO CONTRAST   Final Result   1. Stable volume of right cerebral acute on subacute subdural hemorrhage. 2. Unchanged leftward midline shift at the level of the septum pellucidum   measuring 3 mm. 3. Stable positioning right-sided extra-axial surgical drain. 4. Unchanged distribution and appearance of right frontal, insular and   temporal lobe scattered acute subarachnoid hemorrhage. 5. Moderate cerebral white matter chronic microvascular ischemic disease. 6. Mild diffuse cerebral atrophy. 7. Redemonstration anterior right thalamic remote infarct. 8. Redemonstration bilateral basal ganglia multifocal remote lacunar infarcts. XR CHEST PORTABLE   Final Result   Interval improvement of vascular congestion. CT Head wo Contrast   Final Result   Right joceline hole and subdural drain placement for evacuation of right subdural   hematoma. Interval reduction of hematoma and resolution of midline shift. New subarachnoid hemorrhage. XR SKULL (<4 VIEWS)   Final Result   Recent postop changes of the calvarium as noted above. CT HEAD WO CONTRAST   Final Result   Stable examination. CT head WO contrast   Final Result   Redemonstration of a right frontoparietal subdural hematoma measuring 14 mm   in thickness resulting in 4 mm of right to left midline shift, not   significantly changed from the previous exam.      No new hemorrhage or acute infarct identified. XR CHEST PORTABLE   Final Result      Mild to moderate cardiomegaly and findings suggestive of pulmonary edema with   small left pleural effusion. CT Head WO Contrast   Final Result   Addendum 1 of 1   ADDENDUM:   Case discussed with Dr. Miriam sykes at 10:24 p.m.          Final   Acute on chronic right subdural hematoma with midline shift as above      The findings were sent to the Radiology Results [address] at 10:18   pm on 2/17/2022to be communicated to a licensed caregiver. Labs and Images reviewed with:  [] Dr. Lalit Peng. Amee    [x] Dr. Soy Gardner  [] Dr. Caprice Blancas  [] There are no new interval images to review. PHYSICAL EXAM       CONSTITUTIONAL:  Awake, alert, and oriented to self only. Follows commands. HEAD:  normocephalic, atraumatic    EYES:  PERRLA, EOMI.   ENT:  moist mucous membranes   NECK:  supple, symmetric   LUNGS:  Equal air entry bilaterally   CARDIOVASCULAR:  normal s1 / s2, RRR, distal pulses intact   ABDOMEN:  Soft, no rigidity   NEUROLOGIC:  Mental Status:  Not oriented to date and Not oriented to place,awake             Cranial Nerves:    cranial nerves II-XII are grossly intact    Motor Exam:    Moves all extremities to command, R > L       DRAINS:  [x] Subdural drain, managed by neurosurgery    ASSESSMENT AND PLAN:       This is a 81 y.o. 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 Shady Ceja does have a history of dementia however daughter was concerned as patient was acting different from her baseline today.  CT in the emergency department revealed subdural hematoma with shift.  Neurosurgery and neuro critical care consulted out of the emergency department. Taken to the OR for right sided joceline hole with subdural evacuation and drain placement.      NEUROLOGIC:  - POD # 6 right joceline hole with subdural evacuation  - Subdural drain removed 2/24  - CT head 2/24 stable  - continue Keppra 500 mg BID for ppx  - Goal SBP < 160  - Neuro checks per protocol    CARDIOVASCULAR:  - Goal SBP < 160  - Labetalol PRN  - History of HTN, continue HCTZ 12.5 mg QD and Cozaar 100 mg QD  - Start Norvasc 5 mg QD today  - Continue telemetry    PULMONARY:  - High flow discontinued   - Requiring  2L NC   - CXR stable  - Encourage IS if able to participate  - Wean nasal cannula as tolerated    RENAL/FLUID/ELECTROLYTE:  - BUN 7/ Creatinine 0.49  - Monitor I&Os  - IVF: Discontinued  - Replace electrolytes PRN  - Daily BMP  - MIld hyponatremia,  this AM; continue to monitor    GI/NUTRITION:  NUTRITION:  ADULT DIET; Regular  - Bowel regimen: senna-s daily  - GI prophylaxis: Not indicated    ID:  - Tmax 37.1  - WBC 6.5  - Continue to monitor for fevers  - Daily CBC    HEME:   - H&H 10.5/31.8  - Platelets 967  - Daily CBC    ENDOCRINE:  - Continue to monitor blood glucose, goal <180  - Continue Metformin and alogliptin 12.5 mg QD    OTHER:  - PT/OT/ST   - Code Status: Full    PROPHYLAXIS:  Stress ulcer: NI    DVT PROPHYLAXIS:  - SCD sleeves - Thigh High   - Start chemical prophylaxis when OK with neurosurgery       DISPOSITION:  [x] OK for out of ICU from Neuro Critical Care standpoint    We will continue to follow along. For any changes in exam or patient status please contact Neuro Critical Care.       GRACIE Dia - CNP  Neuro Critical Care  Pager [phone]  2/25/2022     7:06 AM

## 2022-02-25 NOTE — PROGRESS NOTES
Physical Therapy    Facility/Department: [address]  Initial Assessment    NAME: Brock Briscoe  : 1940  MRN: [id number]  Chief Complaint   Patient presents with    Altered Mental Status   s/p joceline holes for subdural hematoma  Date of Service: 2022    Discharge Recommendations:  Patient would benefit from continued therapy after discharge   PT Equipment Recommendations  Other: Patient is not communicating well. Functional history and equipment unknown. Assessment   Body structures, Functions, Activity limitations: Decreased strength;Decreased ROM; Decreased functional mobility ; Decreased cognition;Decreased safe awareness;Decreased vision/visual deficit; Decreased posture  Assessment: Very weak with a right side gaze preference. Limited verbal communication with disorientation. Patient will need further PT to regain functional independence. Prognosis: Good  Decision Making: Medium Complexity  Clinical Presentation: evolving  PT Education: General Safety;PT Role;Plan of Care;Transfer Training;Functional Mobility Training  REQUIRES PT FOLLOW UP: Yes  Activity Tolerance  Activity Tolerance: Patient limited by fatigue;Patient limited by cognitive status       Patient Diagnosis(es): The encounter diagnosis was Subdural hematoma ([address] has a past medical history of Gout, Hyperlipidemia, Hypertension, and Type II or unspecified type diabetes mellitus without mention of complication, not stated as uncontrolled. has a past surgical history that includes [facility] (Right, 2022) and craniotomy (N/A, 2022). Restrictions  Restrictions/Precautions  Restrictions/Precautions: Surgical Protocols,Fall Risk  Required Braces or Orthoses?: No  Position Activity Restriction  Other position/activity restrictions: Activity order per Neurosurgery note on 22:  \"Activity as Tolerated\"  Vision/Hearing  Vision:  (right gaze preference.   With patient's limited verbal communication, a visual field cut is difficult to rule-out.)     Subjective  General  Chart Reviewed: Yes  Patient assessed for rehabilitation services?: Yes  Family / Caregiver Present: No  Follows Commands: Impaired  Other (Comment): Follows some commands as able with confusion and weakness  Pain Screening  Patient Currently in Pain: Yes  Pain Assessment  Pain Assessment: 0-10  Vital Signs  Patient Currently in Pain: No (She does not mention pain. Often does not answer questions such as \"are you in pain? \")       Orientation  Orientation  Overall Orientation Status: Impaired  Orientation Level: Disoriented X4 (Unable to get her to say her last name. Disoriented to place, time, situation.)  Social/Functional History  Social/Functional History  Lives With: Other (comment)  Type of Home:  (Pt oriented to self only (Dementia at baseline, increased confusion & impaired cog currently). Unable to obtain adequate / accurate info from Pt this date. Pt has caregiver, unsure of Pt's prior living situation / prior level of functioning.)  Additional Comments: Dementia at baseline, increased confusion & impaired cog currently. Unable to obtain adequate / accurate info from Pt this date. Pt has caregiver named Fanny Moraes, unsure of Pt's prior living situation / prior level of functioning. Cognition   Cognition  Overall Cognitive Status: Exceptions  Arousal/Alertness: Delayed responses to stimuli;Inconsistent responses to stimuli  Following Commands: Follows one step commands with increased time; Follows one step commands with repetition  Attention Span: Attends with cues to redirect; Difficulty attending to directions  Memory: Decreased recall of biographical Information;Decreased recall of precautions;Decreased recall of recent events;Decreased short term memory;Decreased long term memory  Safety Judgement: Decreased awareness of need for safety;Decreased awareness of need for assistance  Problem Solving: Decreased awareness of errors  Insights: Not aware of deficits  Initiation: Requires cues for all  Sequencing: Requires cues for all    Objective     Observation/Palpation  Posture: Poor  Observation: Gives eye contact. Able to look to the left on command, slowly, and state that therapist was holding up 2 fingers. Strength RLE  Strength RLE: Exception  Comment: Too confused for manual muscle testing. Moves poorly, less than antigravity. Bed mobility  Supine to Sit: Maximum assistance;2 Person assistance  Transfers  Sit to Stand: Maximum Assistance;2 Person Assistance  Stand to sit: Moderate Assistance;2 Person Assistance  Bed to Chair: Dependent/Total        Balance  Sitting - Static: Fair;-  Sitting - Dynamic: Fair;-  Standing - Static: Poor  Standing - Dynamic: Poor        Plan   Plan  Times per week: 5-6x/wk  Current Treatment Recommendations: Balance Training,Endurance Training,Safety Education & Training,Patient/Caregiver Education & Training,Functional Mobility Training,Transfer Training,Gait Training,Equipment Evaluation, Education, & procurement,Positioning,Strengthening  Safety Devices  Type of devices: Left in chair,All fall risk precautions in place,Chair alarm in place,Gait belt,Nurse notified,Call light within reach           AM-PAC Score  AM-PAC Inpatient Mobility Raw Score : 9 (02/25/22 1510)  AM-PAC Inpatient T-Scale Score : 30.55 (02/25/22 1510)  Mobility Inpatient CMS 0-100% Score: 81.38 (02/25/22 1510)  Mobility Inpatient CMS G-Code Modifier : CM (02/25/22 1510)          Goals  Short term goals  Time Frame for Short term goals: 14 visits  Short term goal 1: Supine to/from sit with min A. Short term goal 2: Sit to/from stand with min A. Short term goal 3: Ambulate 4' along edge of bed with RW mod A. Short term goal 4: Improve LE strength to 3/5  to support function.        Therapy Time   Individual Concurrent Group Co-treatment   Time In 1007         Time Out 1040         Minutes 33         Timed Code Treatment Minutes: 8 Minutes Heriberto Greenberg, PT

## 2022-02-25 NOTE — PLAN OF CARE
Nutrition Problem #1: Inadequate oral intake  Intervention: Food and/or Nutrient Delivery: Continue Current Diet,Start Oral Nutrition Supplement  Nutritional Goals: Meet at least 50% of estimated nutrient needs

## 2022-02-25 NOTE — PROGRESS NOTES
Neurosurgery PRECIOUS/Resident    Daily Progress Note   Chief Complaint   Patient presents with    Altered Mental Status     2/25/2022  4:03 PM    Chart reviewed. No acute events overnight. No new complaints. Vitals:    02/25/22 0505 02/25/22 0600 02/25/22 0800 02/25/22 1200   BP: (!) 161/75 (!) 164/70 (!) 135/45 (!) 136/56   Pulse: 80 77 60 79   Resp: 26 21 15 16   Temp:    97.8 °F (36.6 °C)   TempSrc:    Oral   SpO2: 94% 100% 100% 98%       PE:   Alert, oriented to self  follows all commands  E4 V4 M6  Motor  Moving all extremities, Right >left     Incision: clean dry intact          Lab Results   Component Value Date    WBC 6.5 02/25/2022    HGB 10.5 (L) 02/25/2022    HCT 31.8 (L) 02/25/2022     02/25/2022    CHOL 137 10/19/2017    TRIG 56 10/19/2017    HDL 92 10/19/2017    ALT 12 02/17/2022    AST 21 02/17/2022     (L) 02/25/2022    K 3.9 02/25/2022     02/25/2022    CREATININE 0.49 (L) 02/25/2022    BUN 7 (L) 02/25/2022    CO2 22 02/25/2022    TSH 3.49 03/14/2018    INR 1.2 02/17/2022    LABA1C 5.9 10/19/2017    LABMICR 8 10/10/2016         A/P  right-sided subdural hematoma  POD#6 s/p right bur hole for subdural hematoma evacuation       - PT and OT, activity as tolerated   - okay for DVT prophylaxis  - follow up in 2 weeks for staple removal and repeat CT head  - please call with any questions or concerns    Please contact neurosurgery with any changes in patients neurologic status.        Zeb Alexandra CNP  2/25/22  4:03 PM

## 2022-02-25 NOTE — PROGRESS NOTES
[address] [place] [address]     Department of Internal Medicine - Staff Internal Medicine Service   ICU PATIENT TRANSFER NOTE        Patient:  Usama Rose  YOB: 1940  MRN: [id number]     Acct: [de-identified]     Admit date: 2/17/2022    Code Status:-  Full code     Reason for ICU Admission:-       SUPPORT DEVICES: [] Ventilator [] BIPAP  [] Nasal Cannula [x] Room Air    Consultations:- [] Cardiology [] Nephrology  [] Hemo onco  [] GI                               [] ID [] ENT  [] Rheum [] Endo   []Physiotherapy                                 Others:- neuro-surgery    NUTRITION:  [] NPO [] Tube Feeding (Specify: ) [] TPN  [x] PO    Central Lines:- [x] No   [] Yes           If yes - Days/Date of Insertion. Pt seen,examined and Chart reviewed. ICU COURSE:    The patient is a 80 y.o. female with past medical history significant for HTN, DM2, HLD, gout, CVA 08/21 who was initially admitted on 2/17/2022 with Subdural hematoma ([facility] [S06.5X9A]  SDH (subdural hematoma) ([facility] [S06.5X9A] and presented with AMS. Last known well one day before admission. H/o dementia - At baseline patient is able to recognize family members but not AO to time or place. Arjun Feliz onto her bottom one month ago. Not compliant with aspirin    In the ER, imaging showed acute on chronic subdural hematoma 15 mm with 4 mm R to L shift. In the ICU, patient underwent R joceline hole with subdural evacuation 02/19/22     Currently, patient is hypertensive - uncontrolled; increased hctz to 25 mg qd    Physical Exam:   Vitals: BP (!) 164/70   Pulse 77   Temp 98.3 °F (36.8 °C)   Resp 21   SpO2 100%   24 hour intake/output:    Intake/Output Summary (Last 24 hours) at 2/25/2022 1313  Last data filed at 2/25/2022 0505  Gross per 24 hour   Intake 1845.98 ml   Output 200 ml   Net 1645.98 ml     Last 3 weights:   Wt Readings from Last 3 Encounters:   04/11/18 155 lb 3.2 oz (70.4 kg)   03/23/18 155 lb (70.3 kg)   03/18/18 156 lb 11.2 oz (71.1 kg)       General appearance - alert, in no distress  Mental status - alert, oriented to person, place, and time  Eyes - pupils equal and reactive, extraocular eye movements intact  Mouth - mucous membranes moist, pharynx normal without lesions  Neck - supple, no significant adenopathy  Chest - clear to auscultation, no wheezes  Heart - normal rate, regular rhythm, normal S1, S2  Abdomen - soft, nontender, nondistended  Neurological - alert, oriented, normal speech, no focal deficits   Extremities - peripheral pulses normal, no pedal edema  Skin - normal coloration and turgor, no rashes      Medications:Current Inpatient  Scheduled Meds:   amLODIPine  5 mg Oral Daily    [START ON 2/26/2022] hydroCHLOROthiazide  25 mg Oral Daily    And    [START ON 2/26/2022] losartan  100 mg Oral Daily    levETIRAcetam  500 mg Oral BID    sennosides-docusate sodium  2 tablet Oral Daily    atorvastatin  40 mg Oral Daily    sodium chloride flush  5-40 mL IntraVENous 2 times per day    metFORMIN  1,000 mg Oral BID WC    And    alogliptin  12.5 mg Oral Daily     Continuous Infusions:   sodium chloride       PRN Meds:labetalol, sodium chloride flush, sodium chloride, ondansetron **OR** ondansetron, polyethylene glycol, acetaminophen    Objective:    CBC:   Recent Labs     02/23/22  0648 02/24/22  0344 02/25/22  0314   WBC 7.6 7.3 6.5   HGB 11.4* 10.7* 10.5*    230 240     BMP:    Recent Labs     02/23/22  0648 02/24/22  0344 02/25/22  0314    136 133*   K 3.3* 3.7 3.9    102 100   CO2 23 23 22   BUN 5* 6* 7*   CREATININE 0.47* 0.44* 0.49*   GLUCOSE 81 87 104*     Calcium:  Recent Labs     02/25/22  0314   CALCIUM 8.7     Ionized Calcium:No results for input(s): IONCA in the last 72 hours. Magnesium:No results for input(s): MG in the last 72 hours. Phosphorus:No results for input(s): PHOS in the last 72 hours. BNP:No results for input(s): BNP in the last 72 hours.   Glucose:No results for input(s): POCGLU in the last 72 hours. HgbA1C: No results for input(s): LABA1C in the last 72 hours. INR: No results for input(s): INR in the last 72 hours. Hepatic: No results for input(s): ALKPHOS, ALT, AST, PROT, BILITOT, BILIDIR, LABALBU in the last 72 hours. Amylase and Lipase:No results for input(s): LACTA, AMYLASE in the last 72 hours. Lactic Acid: No results for input(s): LACTA in the last 72 hours. CARDIAC ENZYMES:No results for input(s): CKTOTAL, CKMB, CKMBINDEX, TROPONINI in the last 72 hours. BNP: No results for input(s): BNP in the last 72 hours. Lipids: No results for input(s): CHOL, TRIG, HDL, LDL, LDLCALC in the last 72 hours. ABGs: No results found for: PH, PCO2, PO2, HCO3, O2SAT  Thyroid:   Lab Results   Component Value Date    TSH 3.49 03/14/2018        Urinalysis: No results for input(s): BACTERIA, BLOODU, CLARITYU, COLORU, PHUR, PROTEINU, RBCUA, SPECGRAV, BILIRUBINUR, NITRU, WBCUA, LEUKOCYTESUR, GLUCOSEU in the last 72 hours. Assessment:  Principal Problem:    Subdural hematoma (HCC)  Active Problems:    Essential hypertension    Type 2 diabetes mellitus without complication, without long-term current use of insulin (HCC)    Mixed hyperlipidemia    S/P evacuation of subdural hematoma  Resolved Problems:    * No resolved hospital problems. *    Plan:    1. Acute on chronic subdural hematoma s/p R joceline hole with subdural evacuation 02/19/22; subdural drain removal on 02/24. On keppra 500 mg bid per neuro-crit care. Goal SBP < 160 per neurology. Hold dvt prophylaxis. 2. Essential hypertension - uncontrolled - on cozaar 100 mg qd, hctz 25 mg qd, norvasc 5 mg qd  3.  Type 2 DM - controlled - on metformin 1g bid and alogliptin 12.5 mg qd    DVT prophylaxis - EPC cuffs; no ac due to subdural hematoma  GI prophylaxis - not indicated   Consulted PT/OT and     Anni Issa MD             Department of Internal Medicine  [facility]           2/25/2022, 1:13 PM

## 2022-02-25 NOTE — PROGRESS NOTES
Occupational Therapy   Occupational Therapy Initial Assessment    Date: 2022   Patient Name: Fredis Jhaveri  MRN: [id number]     : 1940    Date of Service: 2022    Chief Complaint   Patient presents with    Altered Mental Status     Discharge Recommendations:     OT Equipment Recommendations  Equipment Needed: No    Assessment   Performance deficits / Impairments: Decreased endurance;Decreased coordination;Decreased ADL status; Decreased ROM; Decreased balance;Decreased strength;Decreased safe awareness;Decreased functional mobility   Assessment: Pt currently has deficits related to her ability to independently / safely complete daily tasks, balance and mobility, endurance. At this time, the Pt has decreased ability to return to [facility] and prior living situation. Pt will benefit from continued participation in acute and post-acute OT services to improve independence / to improve functional activity participation. Prognosis: Fair  Decision Making: Medium Complexity  OT Education: OT Role;Orientation;Plan of Care;Home Exercise Program;Equipment;Precautions; ADL Adaptive Strategies;Transfer Training;Energy Conservation  Barriers to Learning: Due to decreased orientation / cognition, Pt verbalized fair- return. REQUIRES OT FOLLOW UP: Yes  Activity Tolerance  Activity Tolerance: Patient Tolerated treatment well;Patient limited by fatigue;Treatment limited secondary to decreased cognition  Safety Devices  Safety Devices in place: Yes  Type of devices: All fall risk precautions in place; Bed alarm in place; Left in chair;Call light within reach;Nurse notified; Chair alarm in place;Gait belt  Restraints  Initially in place: No         Patient Diagnosis(es): The encounter diagnosis was Subdural hematoma ([facility] [address] has a past medical history of Gout, Hyperlipidemia, Hypertension, and Type II or unspecified type diabetes mellitus without mention of complication, not stated as uncontrolled.    has a past surgical history that includes [facility] (Right, 02/19/2022) and craniotomy (N/A, 2/19/2022). Restrictions  Restrictions/Precautions  Restrictions/Precautions: Surgical Protocols,Fall Risk  Required Braces or Orthoses?: No  Position Activity Restriction  Other position/activity restrictions: Activity order per Neurosurgery note on 2/24/22:  \"Activity as Tolerated\"    Subjective   General  Patient assessed for rehabilitation services?: Yes  Family / Caregiver Present: No  Diagnosis: SDH.  s/p Darlene Holes (2/19/22). Subjective  Subjective: RN approved Pt to be seen for OT / PT Evaluations. General Comment  Comments: Pt was agreeable / participative. She was oriented to self only, required max coaxing / cues / encouragement for active participation. Patient Currently in Pain: Yes  Pain Assessment  Pain Assessment: 0-10  Pain Level: 3  Patient's Stated Pain Goal: No pain  Pre Treatment Pain Screening  Intervention List: Patient able to continue with treatment;Nurse called to administer meds  Vital Signs  Patient Currently in Pain: Yes     Social/Functional History  Social/Functional History  Lives With:  (Pt has caregiver, Antoni)  Type of Home:  (Pt oriented to self only (Dementia at baseline, increased confusion & impaired cog currently). Unable to obtain adequate / accurate info from Pt this date. Pt has caregiver, unsure of Pt's prior living situation / prior level of functioning.)  Additional Comments: Pt oriented to self only (Dementia at baseline, increased confusion & impaired cog currently). Unable to obtain adequate / accurate info from Pt this date. Pt has caregiver, unsure of Pt's prior living situation / prior level of functioning. Objective   Vision: Impaired  Vision Exceptions:  (Pt wears glasses (unsure for reading / all the time / etc).   She demo'd impaired / decreased Left sided vision (Left visual neglect vs Left field cut) - will continue to assess and treat.)  Hearing: Within functional limits Orientation  Overall Orientation Status: Impaired  Orientation Level: Oriented to person (with coaxing / cues for person orientation)     Balance  Sitting Balance: Maximum assistance (Static and Dynamic Sitting Max assist progressing to Min then CGA then SBA >15 minutes)  Standing Balance  Time: Max X2 with Noreen Millan x2 trials (<30 sec each)  Functional Mobility  Functional Mobility Comments: Unable to assess this date. ADL  Feeding: Minimal assistance;Maximum assistance;Verbal cueing; Increased time to complete (Pt was initially able to feed herself (with spoon and cup with straw) with Min Assist (assist to scoop only). Pt then became increasingly fatigued and required Max Assist (hand over hand assist) and max Verbal Cues for self-feeding with spoon and cup.)  Grooming: Minimal assistance;Maximum assistance; Increased time to complete;Verbal cueing  LE Dressing: Maximum assistance;Verbal cueing; Increased time to complete (Max Assist to doff / don socks)  Additional Comments: Pt required consistent Max Verbal and Tactile Cues for task initiation / accurate sequencing / correct use of DME. She required increased time and effort for all tasks. Required frequent rest breaks d/t fatigue (and required more assist with ADLs as the session progressed) d/t fatigue. Bed mobility  Sit to Supine: Moderate assistance;2 Person assistance  Comment: With HOB elevated and use of Bedrail (Left). Max Verbal / Tactile Cues for task initiation, sequencing. Increased time and effort demo'd by Pt. Transfers  Sit to stand: Maximum assistance;2 Person assistance  Stand to sit: Maximum assistance;2 Person assistance  Transfer Comments: Attempted Sit to Stand unsuccessfully Max X2 from EOB but Pt unable to clear the bed. Transitioned to Sit to Stand (and Stand to Sit) from EOB with Max X2 with Noreen Millan - Pt successful but required Max Cues / Coaxing and increased time / effort.      Cognition  Overall Cognitive Status: (02/25/22 1217)  ADL Inpatient CMS G-Code Modifier : CL (02/25/22 1217)    Goals  Short term goals  Time Frame for Short term goals: By Discharge  Short term goal 1: Pt will complete UB ADLs with Supervision Assist with Fair+ Integration of EC / Ax Pacing. Short term goal 2: Pt will complete LB ADLs with SBA without LOB. Short term goal 3: Pt will complete Bathroom Transfers with SBA with Correct Use of AD / DME. Short term goal 4: Pt will maintain Dynamic Standing Balance (5-7 minutes, without LOB) and during functional tasks. Short term goal 5: Pt will participation in Functional Mobility in-room / in-home with SBA with Fair+ integration of EC / Ax Pacing.        Therapy Time   Individual Concurrent Group Co-treatment   Time In 1002         Time Out 1105         Minutes 63         Timed Code Treatment Minutes: 23 Minutes (ADL)       ZEESHAN Osullivan, OTR/L

## 2022-02-25 NOTE — PLAN OF CARE
Problem: Falls - Risk of:  Goal: Will remain free from falls  Description: Will remain free from falls  Outcome: Ongoing     Problem: Falls - Risk of:  Goal: Absence of physical injury  Description: Absence of physical injury  Outcome: Ongoing    Patient remained free from falls/injuries. Bed locked and in lowest position. Call light and bedside table within reach. Patient taught to call out appropriately. Bed alarm set. Problem: HEMODYNAMIC STATUS  Goal: Patient has stable vital signs and fluid balance  Outcome: Ongoing     Problem: ACTIVITY INTOLERANCE/IMPAIRED MOBILITY  Goal: Mobility/activity is maintained at optimum level for patient  Outcome: Ongoing     Problem: COMMUNICATION IMPAIRMENT  Goal: Ability to express needs and understand communication  Outcome: Ongoing    Neuro assessments completed. Fall and aspiration precautions in place. Barriers in communication and mobility assessed. Interventions to assist in communication and mobility in place. Adaptive devices used as needed.      Problem: Nutrition  Goal: Optimal nutrition therapy  2/25/2022 1725 by Matilde Bond RN  Outcome: Ongoing

## 2022-02-25 NOTE — PLAN OF CARE
Problem: Falls - Risk of:  Goal: Will remain free from falls  Description: Will remain free from falls  2/24/2022 1954 by Rich Trent RN  Outcome: Ongoing  2/24/2022 1845 by Darren Sawyer RN  Outcome: Ongoing  2/24/2022 1737 by Nasra Daniel RN  Outcome: Ongoing  Goal: Absence of physical injury  Description: Absence of physical injury  2/24/2022 1954 by Rich Trent RN  Outcome: Ongoing  2/24/2022 1845 by Darren Sawyer RN  Outcome: Ongoing  2/24/2022 1737 by Nasra Daniel RN  Outcome: Ongoing     Problem: Infection:  Goal: Will remain free from infection  Description: Will remain free from infection  2/24/2022 1954 by Rich Trent RN  Outcome: Ongoing  2/24/2022 1845 by Darren Sawyer RN  Outcome: Ongoing     Problem: Safety:  Goal: Free from accidental physical injury  Description: Free from accidental physical injury  2/24/2022 1954 by Rich Trent RN  Outcome: Ongoing  2/24/2022 1845 by Darren Sawyer RN  Outcome: Ongoing  Goal: Free from intentional harm  Description: Free from intentional harm  2/24/2022 1954 by Rich Trent RN  Outcome: Ongoing  2/24/2022 1845 by Darren Sawyer RN  Outcome: Ongoing     Problem: Non-Violent Restraints  Goal: Removal from restraints as soon as assessed to be safe  2/24/2022 1954 by Rich Trent RN  Outcome: Ongoing  2/24/2022 1845 by Darren Sawyer RN  Outcome: Ongoing  Goal: No harm/injury to patient while restraints in use  2/24/2022 1954 by Rich Trent RN  Outcome: Ongoing  2/24/2022 1845 by Darren Sawyer RN  Outcome: Ongoing  Goal: Patient's dignity will be maintained  2/24/2022 1954 by Rich Trent RN  Outcome: Ongoing  2/24/2022 1845 by Darren Sawyer RN  Outcome: Ongoing     Problem: Skin Integrity:  Goal: Will show no infection signs and symptoms  Description: Will show no infection signs and symptoms  2/24/2022 1954 by Rich Trent RN  Outcome: Ongoing  2/24/2022 1845 by Darren Sawyer RN  Outcome: Ongoing  2/24/2022 1737 by Jorie Saint, RN  Outcome: Ongoing  Goal: Absence of new skin breakdown  Description: Absence of new skin breakdown  2/24/2022 1954 by Blaine Correia RN  Outcome: Ongoing  2/24/2022 1845 by Asia Shaver RN  Outcome: Ongoing  2/24/2022 1737 by Jorie Saint, RN  Outcome: Ongoing     Problem: COMMUNICATION IMPAIRMENT  Goal: Ability to express needs and understand communication  2/24/2022 1954 by Blaine Correia RN  Outcome: Ongoing  2/24/2022 1845 by Asia Shaver RN  Outcome: Ongoing  2/24/2022 1737 by Jorie Saint, RN  Outcome: Ongoing     Problem: ACTIVITY INTOLERANCE/IMPAIRED MOBILITY  Goal: Mobility/activity is maintained at optimum level for patient  2/24/2022 1954 by Blaine Correia RN  Outcome: Ongoing  2/24/2022 1845 by Asia Shaver RN  Outcome: Ongoing  2/24/2022 1737 by Jorie Saint, RN  Outcome: Ongoing     Problem: HEMODYNAMIC STATUS  Goal: Patient has stable vital signs and fluid balance  2/24/2022 1954 by Blaine Correia RN  Outcome: Ongoing  2/24/2022 1845 by Asia Shaver RN  Outcome: Ongoing  2/24/2022 1737 by Jorie Saint, RN  Outcome: Ongoing

## 2022-02-25 NOTE — PROGRESS NOTES
Comprehensive Nutrition Assessment    Type and Reason for Visit:  Initial (Length of stay)    Nutrition Recommendations/Plan:    - Obtain current height/weight   - Send Ensure Enlive/Plus twice daily    - Encourage PO intake as tolerated    Nutrition Assessment:  Pt with hx of dementia. Spoke with RN who reports pt attempted to feed herself breakfast this morning and only consumed a couple of bites. Pt ate small amount of lunch when fed by RN. No height or weight obtained during this admission. Requested current weight be obtained. RN agreeable. Malnutrition Assessment:  Malnutrition Status:  Insufficient data        Estimated Daily Nutrient Needs: AVA w/o height or weight    Nutrition Related Findings:  meds/labs reviewed      Wounds:  Surgical Incision       Current Nutrition Therapies:    ADULT DIET; Regular      Anthropometric Measures: AVA    Nutrition Diagnosis:   · Inadequate oral intake related to cognitive or neurological impairment as evidenced by intake 0-25%      Nutrition Interventions:   Food and/or Nutrient Delivery:  Continue Current Diet,Start Oral Nutrition Supplement  Nutrition Education/Counseling:  No recommendation at this time   Coordination of Nutrition Care:  Continue to monitor while inpatient    Goals:  Meet at least 50% of estimated nutrient needs       Nutrition Monitoring and Evaluation:   Behavioral-Environmental Outcomes:  None Identified   Food/Nutrient Intake Outcomes:  Food and Nutrient Intake,Supplement Intake  Physical Signs/Symptoms Outcomes:  Weight,Biochemical Data,Nutrition Focused Physical Findings     Discharge Planning:     Too soon to determine     Electronically signed by Sera Almeida, MS, RD, LD on 2/25/22 at 3:00 PM EST    Contact: [phone]

## 2022-02-26 LAB
ABSOLUTE EOS #: 0.48 K/UL (ref 0–0.44)
ABSOLUTE IMMATURE GRANULOCYTE: 0.03 K/UL (ref 0–0.3)
ABSOLUTE LYMPH #: 1.7 K/UL (ref 1.1–3.7)
ABSOLUTE MONO #: 1.04 K/UL (ref 0.1–1.2)
ANION GAP SERPL CALCULATED.3IONS-SCNC: 13 MMOL/L (ref 9–17)
BASOPHILS # BLD: 1 % (ref 0–2)
BASOPHILS ABSOLUTE: 0.05 K/UL (ref 0–0.2)
BUN BLDV-MCNC: 7 MG/DL (ref 8–23)
CALCIUM SERPL-MCNC: 9 MG/DL (ref 8.6–10.4)
CHLORIDE BLD-SCNC: 108 MMOL/L (ref 98–107)
CO2: 23 MMOL/L (ref 20–31)
CREAT SERPL-MCNC: 0.64 MG/DL (ref 0.5–0.9)
EOSINOPHILS RELATIVE PERCENT: 6 % (ref 1–4)
GFR AFRICAN AMERICAN: >60 ML/MIN
GFR NON-AFRICAN AMERICAN: >60 ML/MIN
GFR SERPL CREATININE-BSD FRML MDRD: ABNORMAL ML/MIN/{1.73_M2}
GLUCOSE BLD-MCNC: 82 MG/DL (ref 70–99)
HCT VFR BLD CALC: 37.5 % (ref 36.3–47.1)
HEMOGLOBIN: 11.8 G/DL (ref 11.9–15.1)
IMMATURE GRANULOCYTES: 0 %
LYMPHOCYTES # BLD: 20 % (ref 24–43)
MCH RBC QN AUTO: 31.1 PG (ref 25.2–33.5)
MCHC RBC AUTO-ENTMCNC: 31.5 G/DL (ref 28.4–34.8)
MCV RBC AUTO: 98.7 FL (ref 82.6–102.9)
MONOCYTES # BLD: 12 % (ref 3–12)
NRBC AUTOMATED: 0 PER 100 WBC
PDW BLD-RTO: 16.1 % (ref 11.8–14.4)
PLATELET # BLD: 248 K/UL (ref 138–453)
PMV BLD AUTO: 9.8 FL (ref 8.1–13.5)
POTASSIUM SERPL-SCNC: 4.1 MMOL/L (ref 3.7–5.3)
RBC # BLD: 3.8 M/UL (ref 3.95–5.11)
RBC # BLD: ABNORMAL 10*6/UL
SEG NEUTROPHILS: 61 % (ref 36–65)
SEGMENTED NEUTROPHILS ABSOLUTE COUNT: 5.27 K/UL (ref 1.5–8.1)
SODIUM BLD-SCNC: 144 MMOL/L (ref 135–144)
WBC # BLD: 8.6 K/UL (ref 3.5–11.3)

## 2022-02-26 PROCEDURE — 97530 THERAPEUTIC ACTIVITIES: CPT

## 2022-02-26 PROCEDURE — 6360000002 HC RX W HCPCS: Performed by: NURSE PRACTITIONER

## 2022-02-26 PROCEDURE — 6370000000 HC RX 637 (ALT 250 FOR IP): Performed by: NURSE PRACTITIONER

## 2022-02-26 PROCEDURE — 94761 N-INVAS EAR/PLS OXIMETRY MLT: CPT

## 2022-02-26 PROCEDURE — 80048 BASIC METABOLIC PNL TOTAL CA: CPT

## 2022-02-26 PROCEDURE — 36415 COLL VENOUS BLD VENIPUNCTURE: CPT

## 2022-02-26 PROCEDURE — 6370000000 HC RX 637 (ALT 250 FOR IP): Performed by: STUDENT IN AN ORGANIZED HEALTH CARE EDUCATION/TRAINING PROGRAM

## 2022-02-26 PROCEDURE — 97535 SELF CARE MNGMENT TRAINING: CPT

## 2022-02-26 PROCEDURE — 85025 COMPLETE CBC W/AUTO DIFF WBC: CPT

## 2022-02-26 PROCEDURE — 99232 SBSQ HOSP IP/OBS MODERATE 35: CPT | Performed by: INTERNAL MEDICINE

## 2022-02-26 PROCEDURE — 2580000003 HC RX 258: Performed by: REGISTERED NURSE

## 2022-02-26 PROCEDURE — 2060000000 HC ICU INTERMEDIATE R&B

## 2022-02-26 PROCEDURE — 6360000002 HC RX W HCPCS

## 2022-02-26 PROCEDURE — 97110 THERAPEUTIC EXERCISES: CPT

## 2022-02-26 PROCEDURE — 6370000000 HC RX 637 (ALT 250 FOR IP): Performed by: REGISTERED NURSE

## 2022-02-26 RX ORDER — SITAGLIPTIN AND METFORMIN HYDROCHLORIDE 1000; 50 MG/1; MG/1
TABLET, FILM COATED ORAL
Qty: 60 TABLET | Refills: 2 | Status: ON HOLD | OUTPATIENT
Start: 2022-02-26 | End: 2022-03-17 | Stop reason: HOSPADM

## 2022-02-26 RX ORDER — SENNA AND DOCUSATE SODIUM 50; 8.6 MG/1; MG/1
2 TABLET, FILM COATED ORAL DAILY
Qty: 30 TABLET | Refills: 0 | Status: SHIPPED | OUTPATIENT
Start: 2022-02-26

## 2022-02-26 RX ORDER — LOSARTAN POTASSIUM AND HYDROCHLOROTHIAZIDE 25; 100 MG/1; MG/1
1 TABLET ORAL DAILY
Qty: 90 TABLET | Refills: 1 | Status: ON HOLD | OUTPATIENT
Start: 2022-02-26 | End: 2022-03-17 | Stop reason: HOSPADM

## 2022-02-26 RX ORDER — AMLODIPINE BESYLATE 5 MG/1
5 TABLET ORAL DAILY
Qty: 30 TABLET | Refills: 3 | Status: SHIPPED | OUTPATIENT
Start: 2022-02-26 | End: 2022-02-28 | Stop reason: HOSPADM

## 2022-02-26 RX ADMIN — DOCUSATE SODIUM 50 MG AND SENNOSIDES 8.6 MG 2 TABLET: 8.6; 5 TABLET, FILM COATED ORAL at 12:20

## 2022-02-26 RX ADMIN — HYDROCHLOROTHIAZIDE 25 MG: 25 TABLET ORAL at 12:20

## 2022-02-26 RX ADMIN — METFORMIN HYDROCHLORIDE 1000 MG: 500 TABLET ORAL at 17:41

## 2022-02-26 RX ADMIN — AMLODIPINE BESYLATE 5 MG: 5 TABLET ORAL at 12:21

## 2022-02-26 RX ADMIN — DESMOPRESSIN ACETATE 40 MG: 0.2 TABLET ORAL at 12:20

## 2022-02-26 RX ADMIN — HYDRALAZINE HYDROCHLORIDE 10 MG: 20 INJECTION INTRAMUSCULAR; INTRAVENOUS at 17:41

## 2022-02-26 RX ADMIN — ENOXAPARIN SODIUM 40 MG: 100 INJECTION SUBCUTANEOUS at 12:20

## 2022-02-26 RX ADMIN — SODIUM CHLORIDE, PRESERVATIVE FREE 10 ML: 5 INJECTION INTRAVENOUS at 12:20

## 2022-02-26 RX ADMIN — SODIUM CHLORIDE, PRESERVATIVE FREE 10 ML: 5 INJECTION INTRAVENOUS at 20:34

## 2022-02-26 RX ADMIN — METFORMIN HYDROCHLORIDE 1000 MG: 500 TABLET ORAL at 12:21

## 2022-02-26 RX ADMIN — LOSARTAN POTASSIUM 100 MG: 50 TABLET, FILM COATED ORAL at 12:21

## 2022-02-26 ASSESSMENT — PAIN SCALES - GENERAL
PAINLEVEL_OUTOF10: 0
PAINLEVEL_OUTOF10: 0

## 2022-02-26 ASSESSMENT — PAIN SCALES - PAIN ASSESSMENT IN ADVANCED DEMENTIA (PAINAD)
CONSOLABILITY: 0
NEGVOCALIZATION: 0
FACIALEXPRESSION: 0
BREATHING: 0
BODYLANGUAGE: 0
TOTALSCORE: 0

## 2022-02-26 NOTE — CARE COORDINATION
Transitional planning. Spoke to [employer] with [facility]  Inquiring about pt admission. She will look into acceptance/ admission and call CM back in 5-10 minutes. [address] with [facility] called asking for notes for today , pt notes yesterday     18 Edda's number is: [phone], She will look for therapy notes, review them and call CM back. 80  Pt's RN, Elbert Garza will ask PT/OT to see pt and place notes. She stated pt's  restraints (mitts) were taken off at 4am 2/26 (informed Laine Galeanos of this information)     200 attempted to call pt's legal guardian, Brando Colon, unable to get through with this number. [phone]    80  Spoke to Elba Ambrocio with [address] OF [employer] [facility] she provided pt's legal guardian's number: [phone], correction made in emergency contact list.     [address] number above and spoke to pt's legal guardian, call was disconnected. Attempted to call back,- call forwarded to automated messaging system and mail box was full    [facility]- they have updated PT/OT notes, she is reviewing with medical director and will call CM back.

## 2022-02-26 NOTE — PROGRESS NOTES
Physical Therapy  [address]  Daily Treatment Note  NAME: Milton Carrillo  : 1940  MRN: [id number]    Date of Service: 2022    Discharge Recommendations:  Patient would benefit from continued therapy after discharge        Assessment   Body structures, Functions, Activity limitations: Decreased strength;Decreased ROM; Decreased functional mobility ; Decreased cognition;Decreased safe awareness;Decreased vision/visual deficit; Decreased posture  Assessment: Difficulty with commands. Very weak. Sitting balance is fair. Another standing attempt was not made this PM since she was unable yesterday and with OT treatment today. Patient will need further PT to regain functional independence. PT Education: General Safety;PT Role;Plan of Care;Transfer Training;Functional Mobility Training  REQUIRES PT FOLLOW UP: Yes  Activity Tolerance  Activity Tolerance: Patient limited by fatigue;Patient limited by cognitive status     Patient Diagnosis(es): The primary encounter diagnosis was Subdural hematoma ([facility] [address] Diagnoses of S/P evacuation of subdural hematoma, Type 2 diabetes mellitus without complication, without long-term current use of insulin ([facility] [address] and Essential hypertension were also pertinent to this visit. has a past medical history of Gout, Hyperlipidemia, Hypertension, and Type II or unspecified type diabetes mellitus without mention of complication, not stated as uncontrolled. has a past surgical history that includes [facility] (Right, 2022) and craniotomy (N/A, 2022). Restrictions  Restrictions/Precautions  Restrictions/Precautions: Surgical Protocols,Fall Risk,Up as Tolerated  Required Braces or Orthoses?: No  Position Activity Restriction  Other position/activity restrictions: Activity order per Neurosurgery note on 22:  \"Activity as Tolerated\". Skull incision present.   Subjective   General  Chart Reviewed: Yes  Family / Caregiver Present: No  Pain Screening  Patient Currently in Pain: No (Does not verbalize pain, does not verbally deny. She does not respond to many questions.)  Vital Signs  Patient Currently in Pain: No (Does not verbalize pain, does not verbally deny. She does not respond to many questions.)       Orientation  Orientation  Orientation Level: Oriented to person;Disoriented to place; Disoriented to time;Disoriented to situation  Cognition   Cognition  Overall Cognitive Status: Exceptions  Arousal/Alertness: Delayed responses to stimuli  Following Commands: Inconsistently follows commands  Attention Span: Unable to maintain attention  Memory: Decreased recall of recent events  Safety Judgement: Decreased awareness of need for assistance;Decreased awareness of need for safety  Problem Solving: Decreased awareness of errors  Insights: Not aware of deficits  Initiation: Requires cues for all  Sequencing: Requires cues for all  Objective   Bed mobility  Rolling to Right: Maximum assistance  Supine to Sit: Maximum assistance;2 Person assistance  Sit to Supine: Maximum assistance;2 Person assistance  Comment: After sitting up edge of bed several minutes, she was leaned onto her left elbow several times. She sits easily up from leaning on left elbow in 2 different positions and leaning with arm partially tucked. Balance  Sitting - Static: Fair  Sitting - Dynamic: Poor;+  Comments: Able to reach out of base of support with tactile cues and many verbal cues. Right gaze preference continues. Exercises  Knee Long Arc Quad: Right partial knee extension sitting edge of bed. Very little movement, approx 20 to 25 degrees actively x3 reps. Left LAQ- cued extensively; patient unable to complete even with muscle belly tapping and stimulation of patellar tendon. Comments: Passive ankle pumps with cues to complete on her own. She wiggled right toes twice but doesn't complete further ankle pumps.   With passive SLR, writer unable to get her to push leg back down to the bed even using a stretch reflex to stimulate hip extensors. With passive hip and knee flexion and many cues, she was able to do a trace/1+ hip extension contracture, barely palpable                         AM-PAC Score  AM-PAC Inpatient Mobility Raw Score : 9 (02/26/22 1527)  AM-PAC Inpatient T-Scale Score : 30.55 (02/26/22 1527)  Mobility Inpatient CMS 0-100% Score: 81.38 (02/26/22 1527)  Mobility Inpatient CMS G-Code Modifier : CM (02/26/22 1527)          Goals  Short term goals  Time Frame for Short term goals: 14 visits  Short term goal 1: Supine to/from sit with min A. Short term goal 2: Sit to/from stand with min A. Short term goal 3: Ambulate 4' along edge of bed with RW mod A. Short term goal 4: Improve LE strength to 3/5  to support function. Plan    Plan  Times per week: 5-6x/wk  Current Treatment Recommendations: Balance Training,Endurance Training,Safety Education & Training,Patient/Caregiver Education & Training,Functional Mobility Training,Transfer Training,Gait Training,Equipment Evaluation, Education, & procurement,Positioning,Strengthening  Safety Devices  Type of devices:  All fall risk precautions in place,Gait belt,Nurse notified,Left in bed,Call light within reach     Therapy Time   Individual Concurrent Group Co-treatment   Time In 1455         Time Out 1525         Minutes 30         Timed Code Treatment Minutes: [address] [facility], PT

## 2022-02-26 NOTE — PLAN OF CARE
Problem: Falls - Risk of:  Goal: Will remain free from falls  Description: Will remain free from falls  2/26/2022 0448 by Ele Malloy RN  Outcome: Ongoing  2/25/2022 1725 by Rosa Reyna RN  Outcome: Ongoing  Goal: Absence of physical injury  Description: Absence of physical injury  2/26/2022 0448 by Ele Malloy RN  Outcome: Ongoing  2/25/2022 1725 by Rosa Reyna RN  Outcome: Ongoing     Problem: Infection:  Goal: Will remain free from infection  Description: Will remain free from infection  Outcome: Ongoing     Problem: Safety:  Goal: Free from accidental physical injury  Description: Free from accidental physical injury  Outcome: Ongoing  Goal: Free from intentional harm  Description: Free from intentional harm  Outcome: Ongoing     Problem: Daily Care:  Goal: Daily care needs are met  Description: Daily care needs are met  Outcome: Ongoing     Problem: Pain:  Description: Pain management should include both nonpharmacologic and pharmacologic interventions.   Goal: Patient's pain/discomfort is manageable  Description: Patient's pain/discomfort is manageable  Outcome: Ongoing  Goal: Pain level will decrease  Description: Pain level will decrease  Outcome: Ongoing  Goal: Control of acute pain  Description: Control of acute pain  Outcome: Ongoing  Goal: Control of chronic pain  Description: Control of chronic pain  Outcome: Ongoing     Problem: Skin Integrity:  Goal: Skin integrity will stabilize  Description: Skin integrity will stabilize  Outcome: Ongoing     Problem: Discharge Planning:  Goal: Patients continuum of care needs are met  Description: Patients continuum of care needs are met  Outcome: Ongoing     Problem: HEMODYNAMIC STATUS  Goal: Patient has stable vital signs and fluid balance  2/26/2022 0448 by Ele Malloy RN  Outcome: Ongoing  2/25/2022 1725 by Rosa Reyna RN  Outcome: Ongoing     Problem: ACTIVITY INTOLERANCE/IMPAIRED MOBILITY  Goal: Mobility/activity is maintained at optimum level for patient  2/26/2022 0448 by Robby Lamb RN  Outcome: Ongoing  2/25/2022 1725 by Russ Capps RN  Outcome: Ongoing     Problem: COMMUNICATION IMPAIRMENT  Goal: Ability to express needs and understand communication  2/26/2022 0448 by Robby Lamb RN  Outcome: Ongoing  2/25/2022 1725 by Russ Capps RN  Outcome: Ongoing     Problem: Non-Violent Restraints  Goal: Removal from restraints as soon as assessed to be safe  Outcome: Met This Shift  Goal: No harm/injury to patient while restraints in use  Outcome: Met This Shift  Goal: Patient's dignity will be maintained  Outcome: Completed     Problem: Skin Integrity:  Goal: Will show no infection signs and symptoms  Description: Will show no infection signs and symptoms  Outcome: Ongoing  Goal: Absence of new skin breakdown  Description: Absence of new skin breakdown  Outcome: Ongoing

## 2022-02-26 NOTE — PROGRESS NOTES
[facility]  Internal Medicine Teaching Residency Program  Inpatient Daily Progress Note  ______________________________________________________________________________    Patient: Fredis Jhaveri  YOB: 1940   SCT:[id number]    Acct: [de-identified]     Room: 12/[facility]  Admit date: 2/17/2022  Today's date: 02/26/22  Number of days in the hospital: 8    SUBJECTIVE   Admitting Diagnosis: Subdural hematoma ([facility] [address]  CC: AMS  Pt examined at bedside. Chart & results reviewed. Patient's blood pressure improved 138/59 (low readining of 101/39), afebrile  Patient agitated overnight, received seraquel  BMP wnl, glucose 82; CBC stable  Okay for DVT and PT/OT per neuro-surgery; f/u in 2 weeks for staple removal and repeat CT head; Started on lovenox    ROS:  Constitutional:  negative for chills, fevers, sweats  Respiratory:  negative for cough, dyspnea on exertion, hemoptysis, shortness of breath, wheezing  Cardiovascular:  negative for chest pain, chest pressure/discomfort, lower extremity edema, palpitations  Gastrointestinal:  negative for abdominal pain, constipation, diarrhea, nausea, vomiting  Neurological:  negative for dizziness, headache  BRIEF HISTORY     The patient is a 80 y.o. female with past medical history significant for HTN, DM2, HLD, gout, CVA 08/21 who was initially admitted on 2/17/2022 with Subdural hematoma ([facility] [address] [L72.5R1Z]  SDH (subdural hematoma) ([facility] [address] [F62.7X6N] and presented with AMS. Last known well one day before admission. H/o dementia - At baseline patient is able to recognize family members but not AO to time or place. Lorette Degree onto her bottom one month ago.  Not compliant with aspirin     In the ER, imaging showed acute on chronic subdural hematoma 15 mm with 4 mm R to L shift.      In the ICU, patient underwent R joceline hole with subdural evacuation 02/19/22      Currently, patient is hypertensive - uncontrolled; increased hctz to 25 mg qd    OBJECTIVE     Vital Signs:  BP (!) 138/59   Pulse 75   Temp 98.1 °F (36.7 °C) (Oral)   Resp 16   Wt 147 lb 9.6 oz (67 kg)   SpO2 94%   BMI 27.00 kg/m²     Temp (24hrs), Av °F (36.7 °C), Min:97.8 °F (36.6 °C), Max:98.2 °F (36.8 °C)    In: 1051.6   Out: 1350 [Urine:1350]    Physical Exam:  Constitutional: This is a well developed, well nourished, 25-29.9 - Overweight 80y.o. year old female who is AO X 0 and in no apparent distress. Head: staples present  EENT:  PERRLA. No thrush was noted. Neck: Supple without thyromegaly. No elevated JVP. Respiratory: Chest was symmetrical without dullness to percussion. Breath sounds bilaterally were clear to auscultation. There were no wheezes, rhonchi or rales. Cardiovascular: Regular without murmur, clicks, gallops or rubs. Abdomen: Slightly rounded and soft without organomegaly. No rebound, rigidity or guarding was appreciated. Lymphatic: No lymphadenopathy. Extremities:  L > E sided weakness  Skin:  Warm and dry. Good color, turgor and pigmentation. No lesions or scars.   No cyanosis or clubbing  Neurological/Psychiatric: dementia, AO X 0     Medications:  Scheduled Medications:    amLODIPine  5 mg Oral Daily    hydroCHLOROthiazide  25 mg Oral Daily    And    losartan  100 mg Oral Daily    sennosides-docusate sodium  2 tablet Oral Daily    atorvastatin  40 mg Oral Daily    sodium chloride flush  5-40 mL IntraVENous 2 times per day    metFORMIN  1,000 mg Oral BID WC    And    [Held by provider] alogliptin  12.5 mg Oral Daily     Continuous Infusions:    sodium chloride       PRN MedicationshydrALAZINE, 10 mg, Q6H PRN  labetalol, 10 mg, Q4H PRN  sodium chloride flush, 5-40 mL, PRN  sodium chloride, 25 mL, PRN  ondansetron, 4 mg, Q8H PRN   Or  ondansetron, 4 mg, Q6H PRN  polyethylene glycol, 17 g, Daily PRN  acetaminophen, 650 mg, Q6H PRN        Diagnostic Labs:  CBC:   Recent Labs     22  0344 22  0314 22  0429   WBC 7.3 6.5 8.6   RBC 3.46* 3.32* 3.80*   HGB 10.7* 10.5* 11.8*   HCT 32.3* 31.8* 37.5   MCV 93.4 95.8 98.7   RDW 15.6* 16.0* 16.1*    240 248     BMP:   Recent Labs     02/24/22  0344 02/25/22  0314 02/26/22  0429    133* 144   K 3.7 3.9 4.1    100 108*   CO2 23 22 23   BUN 6* 7* 7*   CREATININE 0.44* 0.49* 0.64     BNP: No results for input(s): BNP in the last 72 hours. PT/INR: No results for input(s): PROTIME, INR in the last 72 hours. APTT: No results for input(s): APTT in the last 72 hours. CARDIAC ENZYMES: No results for input(s): CKMB, CKMBINDEX, TROPONINI in the last 72 hours. Invalid input(s): CKTOTAL;3  FASTING LIPID PANEL:  Lab Results   Component Value Date    CHOL 137 10/19/2017    HDL 92 10/19/2017    TRIG 56 10/19/2017     LIVER PROFILE: No results for input(s): AST, ALT, ALB, BILIDIR, BILITOT, ALKPHOS in the last 72 hours. MICROBIOLOGY:   Lab Results   Component Value Date/Time    CULTURE KLEBSIELLA PNEUMONIAE 10 to 50,000 CFU/ML (A) 02/19/2022 08:21 AM       Imaging:    CT HEAD WO CONTRAST    Result Date: 2/24/2022  1. Stable volume acute on subacute right cerebral subdural hemorrhage with unchanged positioning of right-sided extra-axial surgical drain. 2. Unchanged distribution and appearance of right cerebral scattered acute subarachnoid hemorrhage. 3. Stable leftward midline shift at the level of the septum pellucidum measuring 3 mm. 4. Right basal ganglia and thalamic multifocal remote lacunar infarcts. 5. Moderate cerebral white matter chronic microvascular ischemic disease. CT HEAD WO CONTRAST    Result Date: 2/22/2022  1. Stable volume of right cerebral acute on subacute subdural hemorrhage. 2. Unchanged leftward midline shift at the level of the septum pellucidum measuring 3 mm. 3. Stable positioning right-sided extra-axial surgical drain. 4. Unchanged distribution and appearance of right frontal, insular and temporal lobe scattered acute subarachnoid hemorrhage.  5. Moderate cerebral white matter chronic microvascular ischemic disease. 6. Mild diffuse cerebral atrophy. 7. Redemonstration anterior right thalamic remote infarct. 8. Redemonstration bilateral basal ganglia multifocal remote lacunar infarcts. CT Head wo Contrast    Result Date: 2/20/2022  Right joceline hole and subdural drain placement for evacuation of right subdural hematoma. Interval reduction of hematoma and resolution of midline shift. New subarachnoid hemorrhage. XR CHEST PORTABLE    Result Date: 2/22/2022  Slightly greater appearing changes left base, as above, but no other interval change. No radiographic CHF or large pleural effusion. XR CHEST PORTABLE    Result Date: 2/20/2022  Interval improvement of vascular congestion. XR SKULL (<4 VIEWS)    Result Date: 2/20/2022  Recent postop changes of the calvarium as noted above. ASSESSMENT & PLAN     ASSESSMENT / PLAN:     Principal Problem:    Subdural hematoma (HCC)  Active Problems:    Essential hypertension    Type 2 diabetes mellitus without complication, without long-term current use of insulin (HCC)    Mixed hyperlipidemia    S/P evacuation of subdural hematoma  Resolved Problems:    * No resolved hospital problems. *    1. Acute on chronic subdural hematoma s/p R joceline hole with subdural evacuation 02/19/22; subdural drain removal on 02/24. On keppra 500 mg bid per neuro-crit care. Goal SBP < 160 per neurology. Okay for DVT and PT/OT per neuro-surgery; f/u in 2 weeks for staple removal and repeat CT head  2. Essential hypertension - uncontrolled - on cozaar 100 mg qd, hctz 25 mg qd, norvasc 5 mg qd  3. Type 2 DM - controlled - on metformin 1g bid and alogliptin 12.5 mg qd     DVT prophylaxis - lovenox  GI prophylaxis - not indicated   Consulted PT/OT and     Valdez Snyder MD  Internal Medicine Resident, PGY-1  [facility];  [place], [place]  2/26/2022, 6:34 AM

## 2022-02-26 NOTE — PROGRESS NOTES
Occupational Therapy  Facility/Department: [address] [facility]  Daily Treatment Note  NAME: Jazzy Gurrola  : 1940  MRN: [id number]    Date of Service: 2022    Discharge Recommendations:  Patient would benefit from continued therapy after discharge       Assessment   Performance deficits / Impairments: Decreased endurance;Decreased coordination;Decreased ADL status; Decreased ROM; Decreased balance;Decreased strength;Decreased safe awareness;Decreased functional mobility ; Decreased cognition;Decreased vision/visual deficit; Decreased posture  Prognosis: Fair  Patient Education: OT POC, transfer safety, importance of particpation in therapy, bed mobility with poor return. Barriers to Learning: Cognition. REQUIRES OT FOLLOW UP: Yes  Activity Tolerance  Activity Tolerance: Treatment limited secondary to decreased cognition  Safety Devices  Safety Devices in place: Yes  Type of devices: Call light within reach;Gait belt;Left in bed;Nurse notified         Patient Diagnosis(es): The primary encounter diagnosis was Subdural hematoma ([facility] [address] Diagnoses of S/P evacuation of subdural hematoma, Type 2 diabetes mellitus without complication, without long-term current use of insulin ([facility] [address] and Essential hypertension were also pertinent to this visit. has a past medical history of Gout, Hyperlipidemia, Hypertension, and Type II or unspecified type diabetes mellitus without mention of complication, not stated as uncontrolled. has a past surgical history that includes [facility] (Right, 2022) and craniotomy (N/A, 2022). Restrictions  Restrictions/Precautions  Restrictions/Precautions: Surgical Protocols,Fall Risk,Up as Tolerated  Required Braces or Orthoses?: No  Position Activity Restriction  Other position/activity restrictions:  Activity order per Neurosurgery note on 22:  \"Activity as Tolerated\"  Subjective   General  Patient assessed for rehabilitation services?: Yes  Family / Caregiver Present: No  Diagnosis: SDH.  s/p Darlene Holes (2/19/22). Subjective  Vital Signs  Patient Currently in Pain: Denies   Orientation  Orientation  Overall Orientation Status: Impaired  Orientation Level: Oriented to person (Pt responds when name called. Pt answering all questions with yes mamm.)  Objective    ADL  UE Bathing: Setup;Verbal cueing; Increased time to complete;Maximum assistance (wash face seated EOB.)  Additional Comments: Pt completed supine/sit transfer to seated EOB. Pt handed wash cloth and instructed to wash face. Pt unable to locate wash cloth, therapist placed in pt hand. Pt brought wash cloth to chin briefly then returned hand to lap and rubbed fingers on L hand with cloth. Pt instructed to wash face again, unable to comply requiring physical assistance to bring hand to face. Pt once again wiped chin then returned hand to lap. Sit/stand transfer using ramón stedy. Pt returned to supine in bed at end of session. Increased time needed to complete all tasks. Pt slow to respond with multiple verbal cues to keep eyes open and stay on task with poor/fair return. Balance  Sitting Balance: Minimal assistance (Seated EOB x6 minutes. Verbal cues for hand placement with fair return.)  Standing Balance: Unable to assess(comment)  Standing Balance  Comment: Attempt to stand, max A x2, in ramón stedy x2, pt unable to come up into full extension. Bed mobility  Rolling to Left: Moderate assistance  Rolling to Right: Maximum assistance  Supine to Sit: 2 Person assistance;Maximum assistance  Sit to Supine: Maximum assistance;2 Person assistance  Scooting: Maximal assistance  Comment: HOB elevated for supine/sit transfer. Transfers  Sit to stand: Maximum assistance;2 Person assistance  Stand to sit: Maximum assistance;2 Person assistance  Transfer Comments: Attempt x2 to  ramón stedy with poor return.   Cognition  Overall Cognitive Status: Exceptions  Arousal/Alertness: Delayed responses to stimuli  Following Commands: Inconsistently follows commands  Attention Span: Unable to maintain attention  Memory: Decreased recall of recent events  Safety Judgement: Decreased awareness of need for assistance;Decreased awareness of need for safety  Problem Solving: Decreased awareness of errors  Insights: Not aware of deficits  Initiation: Requires cues for all  Sequencing: Requires cues for all  Plan   Plan  Times per week: 4-5x/week  (SNF vs IPR)  Times per day: Daily  Current Treatment Recommendations: Strengthening,Patient/Caregiver Education & Training,Home Management Training,Equipment Evaluation, Education, & procurement,Balance Training,Neuromuscular Re-education,Functional Mobility Training,Endurance Training,Pain Management,Safety Education & Training,Self-Care / ADL  AM-PAC Score        AM-PAC Inpatient Daily Activity Raw Score: 9 (02/26/22 1440)  AM-PAC Inpatient ADL T-Scale Score : 25.33 (02/26/22 1440)  ADL Inpatient CMS 0-100% Score: 79.59 (02/26/22 1440)  ADL Inpatient CMS G-Code Modifier : CL (02/26/22 1440)    Goals  Short term goals  Time Frame for Short term goals: By Discharge  Short term goal 1: Pt will complete UB ADLs with Supervision Assist with Fair+ Integration of EC / Ax Pacing. Short term goal 2: Pt will complete LB ADLs with SBA without LOB. Short term goal 3: Pt will complete Bathroom Transfers with SBA with Correct Use of AD / DME. Short term goal 4: Pt will maintain Dynamic Standing Balance (5-7 minutes, without LOB) and during functional tasks. Short term goal 5: Pt will participation in Functional Mobility in-room / in-home with SBA with Fair+ integration of EC / Ax Pacing. Therapy Time   Individual Concurrent Group Co-treatment   Time In 1341         Time Out 1409         Minutes 28         Timed Code Treatment Minutes: 28 Minutes     Pt supine in bed upon therapist arrival. Pleasantly confused and agreeable to therapy. See above for LOF for all tasks.  Pt retired to supine in bed at end of session with call light within reach.     NINA Ayala/LAYNE

## 2022-02-27 LAB
ABSOLUTE EOS #: 0.32 K/UL (ref 0–0.44)
ABSOLUTE IMMATURE GRANULOCYTE: 0.03 K/UL (ref 0–0.3)
ABSOLUTE LYMPH #: 1.88 K/UL (ref 1.1–3.7)
ABSOLUTE MONO #: 0.82 K/UL (ref 0.1–1.2)
ANION GAP SERPL CALCULATED.3IONS-SCNC: 12 MMOL/L (ref 9–17)
BASOPHILS # BLD: 1 % (ref 0–2)
BASOPHILS ABSOLUTE: 0.04 K/UL (ref 0–0.2)
BUN BLDV-MCNC: 8 MG/DL (ref 8–23)
CALCIUM SERPL-MCNC: 9.4 MG/DL (ref 8.6–10.4)
CHLORIDE BLD-SCNC: 105 MMOL/L (ref 98–107)
CO2: 25 MMOL/L (ref 20–31)
CREAT SERPL-MCNC: 0.52 MG/DL (ref 0.5–0.9)
EOSINOPHILS RELATIVE PERCENT: 5 % (ref 1–4)
GFR AFRICAN AMERICAN: >60 ML/MIN
GFR NON-AFRICAN AMERICAN: >60 ML/MIN
GFR SERPL CREATININE-BSD FRML MDRD: NORMAL ML/MIN/{1.73_M2}
GLUCOSE BLD-MCNC: 86 MG/DL (ref 70–99)
HCT VFR BLD CALC: 32.9 % (ref 36.3–47.1)
HEMOGLOBIN: 11 G/DL (ref 11.9–15.1)
IMMATURE GRANULOCYTES: 0 %
LYMPHOCYTES # BLD: 26 % (ref 24–43)
MCH RBC QN AUTO: 31.5 PG (ref 25.2–33.5)
MCHC RBC AUTO-ENTMCNC: 33.4 G/DL (ref 28.4–34.8)
MCV RBC AUTO: 94.3 FL (ref 82.6–102.9)
MONOCYTES # BLD: 12 % (ref 3–12)
NRBC AUTOMATED: 0 PER 100 WBC
PDW BLD-RTO: 15.9 % (ref 11.8–14.4)
PLATELET # BLD: 290 K/UL (ref 138–453)
PMV BLD AUTO: 9.5 FL (ref 8.1–13.5)
POTASSIUM SERPL-SCNC: 3.9 MMOL/L (ref 3.7–5.3)
RBC # BLD: 3.49 M/UL (ref 3.95–5.11)
RBC # BLD: ABNORMAL 10*6/UL
SEG NEUTROPHILS: 56 % (ref 36–65)
SEGMENTED NEUTROPHILS ABSOLUTE COUNT: 4.05 K/UL (ref 1.5–8.1)
SODIUM BLD-SCNC: 142 MMOL/L (ref 135–144)
WBC # BLD: 7.1 K/UL (ref 3.5–11.3)

## 2022-02-27 PROCEDURE — 6370000000 HC RX 637 (ALT 250 FOR IP): Performed by: STUDENT IN AN ORGANIZED HEALTH CARE EDUCATION/TRAINING PROGRAM

## 2022-02-27 PROCEDURE — 99232 SBSQ HOSP IP/OBS MODERATE 35: CPT | Performed by: INTERNAL MEDICINE

## 2022-02-27 PROCEDURE — 2060000000 HC ICU INTERMEDIATE R&B

## 2022-02-27 PROCEDURE — 6370000000 HC RX 637 (ALT 250 FOR IP): Performed by: REGISTERED NURSE

## 2022-02-27 PROCEDURE — 6370000000 HC RX 637 (ALT 250 FOR IP): Performed by: NURSE PRACTITIONER

## 2022-02-27 PROCEDURE — 6360000002 HC RX W HCPCS

## 2022-02-27 PROCEDURE — 36415 COLL VENOUS BLD VENIPUNCTURE: CPT

## 2022-02-27 PROCEDURE — 80048 BASIC METABOLIC PNL TOTAL CA: CPT

## 2022-02-27 PROCEDURE — 6370000000 HC RX 637 (ALT 250 FOR IP)

## 2022-02-27 PROCEDURE — 2580000003 HC RX 258: Performed by: REGISTERED NURSE

## 2022-02-27 PROCEDURE — 85025 COMPLETE CBC W/AUTO DIFF WBC: CPT

## 2022-02-27 RX ORDER — AMLODIPINE BESYLATE 10 MG/1
10 TABLET ORAL DAILY
Status: DISCONTINUED | OUTPATIENT
Start: 2022-02-27 | End: 2022-03-02 | Stop reason: HOSPADM

## 2022-02-27 RX ADMIN — SODIUM CHLORIDE, PRESERVATIVE FREE 10 ML: 5 INJECTION INTRAVENOUS at 10:07

## 2022-02-27 RX ADMIN — DOCUSATE SODIUM 50 MG AND SENNOSIDES 8.6 MG 2 TABLET: 8.6; 5 TABLET, FILM COATED ORAL at 10:10

## 2022-02-27 RX ADMIN — METFORMIN HYDROCHLORIDE 1000 MG: 500 TABLET ORAL at 10:10

## 2022-02-27 RX ADMIN — DESMOPRESSIN ACETATE 40 MG: 0.2 TABLET ORAL at 10:09

## 2022-02-27 RX ADMIN — LOSARTAN POTASSIUM 100 MG: 50 TABLET, FILM COATED ORAL at 10:10

## 2022-02-27 RX ADMIN — HYDROCHLOROTHIAZIDE 25 MG: 25 TABLET ORAL at 10:10

## 2022-02-27 RX ADMIN — METFORMIN HYDROCHLORIDE 1000 MG: 500 TABLET ORAL at 17:15

## 2022-02-27 RX ADMIN — AMLODIPINE BESYLATE 10 MG: 10 TABLET ORAL at 10:10

## 2022-02-27 RX ADMIN — SODIUM CHLORIDE, PRESERVATIVE FREE 10 ML: 5 INJECTION INTRAVENOUS at 20:02

## 2022-02-27 RX ADMIN — ENOXAPARIN SODIUM 40 MG: 100 INJECTION SUBCUTANEOUS at 10:09

## 2022-02-27 ASSESSMENT — PAIN SCALES - GENERAL
PAINLEVEL_OUTOF10: 0

## 2022-02-27 NOTE — PROGRESS NOTES
[facility]  Internal Medicine Teaching Residency Program  Inpatient Daily Progress Note  ______________________________________________________________________________    Patient: Boni Seo  YOB: 1940   BMD:[id number]    Acct: [de-identified]     Room: [facility]  Admit date: 2/17/2022  Today's date: 02/27/22  Number of days in the hospital: 9    SUBJECTIVE   Admitting Diagnosis: Subdural hematoma ([facility] [address]  CC: AMS  Pt examined at bedside. Chart & results reviewed. Patient's blood pressure elevated 154/83 mm Hg, HR 96, afebrile  No acute events overnight  Patient doing better, moving bilateral LE, encouraged to work with physical therapy  BMP wnl; CBC stable  Increased norvasc 10 mg qd  Awaiting placement    ROS:  Constitutional:  negative for chills, fevers, sweats  Respiratory:  negative for cough, dyspnea on exertion, hemoptysis, shortness of breath, wheezing  Cardiovascular:  negative for chest pain, chest pressure/discomfort, lower extremity edema, palpitations  Gastrointestinal:  negative for abdominal pain, constipation, diarrhea, nausea, vomiting  Neurological:  negative for dizziness, headache  BRIEF HISTORY     patient is a 81 y.o. female with past medical history significant for HTN, DM2, HLD, gout, CVA 08/21 who was initially admitted on 2/17/2022 with Subdural hematoma ([facility] [address] [S06.5X9A]  SDH (subdural hematoma) ([facility] [address] [F24.9C6C] [facility] presented with AMS. Last known well one day before admission. H/o dementia - At baseline patient is able to recognize family members but not AO to time or place. Ludmila Fermo onto her bottom one month ago.  Not compliant with aspirin     In the ER, imaging showed acute on chronic subdural hematoma 15 mm with 4 mm R to L shift.      In the ICU, patient underwent R joceline hole with subdural evacuation 02/19/22      Currently, patient is hypertensive - uncontrolled; increased hctz to 25 mg qd    OBJECTIVE     Vital Signs:  BP (!) 154/83   Pulse 96   Temp 98.3 °F (36.8 °C)   Resp 16   Wt 147 lb 9.6 oz (67 kg)   SpO2 95%   BMI 27.00 kg/m²     Temp (24hrs), Av.3 °F (36.8 °C), Min:97.9 °F (36.6 °C), Max:98.5 °F (36.9 °C)    In: 10   Out: 200 [Urine:200]    Physical Exam:  Constitutional: This is a well developed, well nourished, 25-29.9 - Overweight 80y.o. year old female who is not alert, oriented. She is in no apparent distress. Head:normocephalic and atraumatic. EENT:  PERRLA. No thrush was noted. Neck: Supple without thyromegaly. No elevated JVP. Respiratory: Chest was symmetrical without dullness to percussion. Breath sounds bilaterally were clear to auscultation. There were no wheezes, rhonchi or rales. Cardiovascular: Regular without murmur, clicks, gallops or rubs. Abdomen: Slightly rounded and soft without organomegaly. No rebound, rigidity or guarding was appreciated. Lymphatic: No lymphadenopathy. Extremities:  No lower extremity edema, ulcerations, varicosities or erythema. Muscle size, tone and strength are normal.  No involuntary movements are noted. Skin:  Warm and dry. Good color, turgor and pigmentation. No lesions or scars.   No cyanosis or clubbing  Neurological/Psychiatric: AO X 0; L > R sided weakness    Medications:  Scheduled Medications:    enoxaparin  40 mg SubCUTAneous Daily    amLODIPine  5 mg Oral Daily    hydroCHLOROthiazide  25 mg Oral Daily    And    losartan  100 mg Oral Daily    sennosides-docusate sodium  2 tablet Oral Daily    atorvastatin  40 mg Oral Daily    sodium chloride flush  5-40 mL IntraVENous 2 times per day    metFORMIN  1,000 mg Oral BID WC    And    [Held by provider] alogliptin  12.5 mg Oral Daily     Continuous Infusions:    sodium chloride       PRN MedicationshydrALAZINE, 10 mg, Q6H PRN  labetalol, 10 mg, Q4H PRN  sodium chloride flush, 5-40 mL, PRN  sodium chloride, 25 mL, PRN  ondansetron, 4 mg, Q8H PRN   Or  ondansetron, 4 mg, Q6H PRN  polyethylene glycol, 17 g, Daily PRN  acetaminophen, 650 mg, Q6H PRN        Diagnostic Labs:  CBC:   Recent Labs     02/25/22 0314 02/26/22 0429 02/27/22  0613   WBC 6.5 8.6 7.1   RBC 3.32* 3.80* 3.49*   HGB 10.5* 11.8* 11.0*   HCT 31.8* 37.5 32.9*   MCV 95.8 98.7 94.3   RDW 16.0* 16.1* 15.9*    248 290     BMP:   Recent Labs     02/25/22 0314 02/26/22 0429 02/27/22  0613   * 144 142   K 3.9 4.1 3.9    108* 105   CO2 22 23 25   BUN 7* 7* 8   CREATININE 0.49* 0.64 0.52     BNP: No results for input(s): BNP in the last 72 hours. PT/INR: No results for input(s): PROTIME, INR in the last 72 hours. APTT: No results for input(s): APTT in the last 72 hours. CARDIAC ENZYMES: No results for input(s): CKMB, CKMBINDEX, TROPONINI in the last 72 hours. Invalid input(s): CKTOTAL;3  FASTING LIPID PANEL:  Lab Results   Component Value Date    CHOL 137 10/19/2017    HDL 92 10/19/2017    TRIG 56 10/19/2017     LIVER PROFILE: No results for input(s): AST, ALT, ALB, BILIDIR, BILITOT, ALKPHOS in the last 72 hours. MICROBIOLOGY:   Lab Results   Component Value Date/Time    CULTURE KLEBSIELLA PNEUMONIAE 10 to 50,000 CFU/ML (A) 02/19/2022 08:21 AM       Imaging:    CT HEAD WO CONTRAST    Result Date: 2/24/2022  1. Stable volume acute on subacute right cerebral subdural hemorrhage with unchanged positioning of right-sided extra-axial surgical drain. 2. Unchanged distribution and appearance of right cerebral scattered acute subarachnoid hemorrhage. 3. Stable leftward midline shift at the level of the septum pellucidum measuring 3 mm. 4. Right basal ganglia and thalamic multifocal remote lacunar infarcts. 5. Moderate cerebral white matter chronic microvascular ischemic disease. CT HEAD WO CONTRAST    Result Date: 2/22/2022  1. Stable volume of right cerebral acute on subacute subdural hemorrhage. 2. Unchanged leftward midline shift at the level of the septum pellucidum measuring 3 mm.  3. Stable positioning right-sided extra-axial surgical drain. 4. Unchanged distribution and appearance of right frontal, insular and temporal lobe scattered acute subarachnoid hemorrhage. 5. Moderate cerebral white matter chronic microvascular ischemic disease. 6. Mild diffuse cerebral atrophy. 7. Redemonstration anterior right thalamic remote infarct. 8. Redemonstration bilateral basal ganglia multifocal remote lacunar infarcts. XR CHEST PORTABLE    Result Date: 2/22/2022  Slightly greater appearing changes left base, as above, but no other interval change. No radiographic CHF or large pleural effusion. XR CHEST PORTABLE    Result Date: 2/20/2022  Interval improvement of vascular congestion. ASSESSMENT & PLAN     ASSESSMENT / PLAN:     Principal Problem:    Subdural hematoma (HCC)  Active Problems:    Essential hypertension    Type 2 diabetes mellitus without complication, without long-term current use of insulin (HCC)    Mixed hyperlipidemia    S/P evacuation of subdural hematoma  Resolved Problems:    * No resolved hospital problems. *    1. Acute on chronic subdural hematoma s/p R joceline hole with subdural evacuation 02/19/22; subdural drain removal on 02/24. On keppra 500 mg bid per neuro-crit care. Goal SBP < 160 per neurology. Okay for DVT and PT/OT per neuro-surgery; f/u in 2 weeks for staple removal and repeat CT head  2. Essential hypertension - uncontrolled - on cozaar 100 mg qd, hctz 25 mg qd, norvasc increased to 10 mg qd  3. Type 2 DM - controlled - on metformin 1g bid and HELD alogliptin 12.5 mg qd     DVT prophylaxis - lovenox  GI prophylaxis - not indicated   Consulted PT/OT and     Latonia Finn MD  Internal Medicine Resident, PGY-1  [address], [place]  2/27/2022, 7:17 AM

## 2022-02-27 NOTE — CARE COORDINATION
Transitional planning. Adrianne Blechuck with [facility] called and informed CM that after discussing pt therapy notes with their Medical Director,they do not feel pt would be appropriate for their facility.

## 2022-02-27 NOTE — CARE COORDINATION
Placed call to [facility], referral in review, will call writer back. Received call from [facility], they may be able to accept pt tomorrow.

## 2022-02-28 LAB
ABSOLUTE EOS #: 0.28 K/UL (ref 0–0.44)
ABSOLUTE IMMATURE GRANULOCYTE: 0.04 K/UL (ref 0–0.3)
ABSOLUTE LYMPH #: 1.97 K/UL (ref 1.1–3.7)
ABSOLUTE MONO #: 0.82 K/UL (ref 0.1–1.2)
ANION GAP SERPL CALCULATED.3IONS-SCNC: 13 MMOL/L (ref 9–17)
BASOPHILS # BLD: 1 % (ref 0–2)
BASOPHILS ABSOLUTE: 0.05 K/UL (ref 0–0.2)
BUN BLDV-MCNC: 6 MG/DL (ref 8–23)
CALCIUM SERPL-MCNC: 9.3 MG/DL (ref 8.6–10.4)
CHLORIDE BLD-SCNC: 103 MMOL/L (ref 98–107)
CO2: 23 MMOL/L (ref 20–31)
CREAT SERPL-MCNC: 0.41 MG/DL (ref 0.5–0.9)
EOSINOPHILS RELATIVE PERCENT: 4 % (ref 1–4)
GFR AFRICAN AMERICAN: >60 ML/MIN
GFR NON-AFRICAN AMERICAN: >60 ML/MIN
GFR SERPL CREATININE-BSD FRML MDRD: ABNORMAL ML/MIN/{1.73_M2}
GLUCOSE BLD-MCNC: 78 MG/DL (ref 70–99)
GLUCOSE BLD-MCNC: 89 MG/DL (ref 65–105)
HCT VFR BLD CALC: 32.9 % (ref 36.3–47.1)
HEMOGLOBIN: 10.7 G/DL (ref 11.9–15.1)
IMMATURE GRANULOCYTES: 1 %
LYMPHOCYTES # BLD: 28 % (ref 24–43)
MCH RBC QN AUTO: 31.5 PG (ref 25.2–33.5)
MCHC RBC AUTO-ENTMCNC: 32.5 G/DL (ref 28.4–34.8)
MCV RBC AUTO: 96.8 FL (ref 82.6–102.9)
MONOCYTES # BLD: 12 % (ref 3–12)
NRBC AUTOMATED: 0 PER 100 WBC
PDW BLD-RTO: 15.9 % (ref 11.8–14.4)
PLATELET # BLD: 333 K/UL (ref 138–453)
PMV BLD AUTO: 9.7 FL (ref 8.1–13.5)
POTASSIUM SERPL-SCNC: 3.5 MMOL/L (ref 3.7–5.3)
RBC # BLD: 3.4 M/UL (ref 3.95–5.11)
RBC # BLD: ABNORMAL 10*6/UL
SEG NEUTROPHILS: 54 % (ref 36–65)
SEGMENTED NEUTROPHILS ABSOLUTE COUNT: 3.88 K/UL (ref 1.5–8.1)
SODIUM BLD-SCNC: 139 MMOL/L (ref 135–144)
WBC # BLD: 7 K/UL (ref 3.5–11.3)

## 2022-02-28 PROCEDURE — 6370000000 HC RX 637 (ALT 250 FOR IP): Performed by: REGISTERED NURSE

## 2022-02-28 PROCEDURE — 85025 COMPLETE CBC W/AUTO DIFF WBC: CPT

## 2022-02-28 PROCEDURE — 80048 BASIC METABOLIC PNL TOTAL CA: CPT

## 2022-02-28 PROCEDURE — 6370000000 HC RX 637 (ALT 250 FOR IP)

## 2022-02-28 PROCEDURE — 36415 COLL VENOUS BLD VENIPUNCTURE: CPT

## 2022-02-28 PROCEDURE — 6370000000 HC RX 637 (ALT 250 FOR IP): Performed by: STUDENT IN AN ORGANIZED HEALTH CARE EDUCATION/TRAINING PROGRAM

## 2022-02-28 PROCEDURE — 99222 1ST HOSP IP/OBS MODERATE 55: CPT | Performed by: STUDENT IN AN ORGANIZED HEALTH CARE EDUCATION/TRAINING PROGRAM

## 2022-02-28 PROCEDURE — 2580000003 HC RX 258: Performed by: REGISTERED NURSE

## 2022-02-28 PROCEDURE — 99232 SBSQ HOSP IP/OBS MODERATE 35: CPT | Performed by: INTERNAL MEDICINE

## 2022-02-28 PROCEDURE — 6370000000 HC RX 637 (ALT 250 FOR IP): Performed by: NURSE PRACTITIONER

## 2022-02-28 PROCEDURE — 6360000002 HC RX W HCPCS

## 2022-02-28 PROCEDURE — 2060000000 HC ICU INTERMEDIATE R&B

## 2022-02-28 PROCEDURE — 82947 ASSAY GLUCOSE BLOOD QUANT: CPT

## 2022-02-28 RX ORDER — POTASSIUM CHLORIDE 20 MEQ/1
20 TABLET, EXTENDED RELEASE ORAL ONCE
Status: COMPLETED | OUTPATIENT
Start: 2022-02-28 | End: 2022-02-28

## 2022-02-28 RX ORDER — AMLODIPINE BESYLATE 10 MG/1
10 TABLET ORAL DAILY
Qty: 30 TABLET | Refills: 3 | Status: ON HOLD | OUTPATIENT
Start: 2022-03-01 | End: 2022-03-17 | Stop reason: HOSPADM

## 2022-02-28 RX ADMIN — SODIUM CHLORIDE, PRESERVATIVE FREE 10 ML: 5 INJECTION INTRAVENOUS at 20:21

## 2022-02-28 RX ADMIN — AMLODIPINE BESYLATE 10 MG: 10 TABLET ORAL at 08:15

## 2022-02-28 RX ADMIN — POTASSIUM CHLORIDE 20 MEQ: 1500 TABLET, EXTENDED RELEASE ORAL at 09:25

## 2022-02-28 RX ADMIN — LOSARTAN POTASSIUM 100 MG: 50 TABLET, FILM COATED ORAL at 08:16

## 2022-02-28 RX ADMIN — METFORMIN HYDROCHLORIDE 1000 MG: 500 TABLET ORAL at 08:15

## 2022-02-28 RX ADMIN — ENOXAPARIN SODIUM 40 MG: 100 INJECTION SUBCUTANEOUS at 08:15

## 2022-02-28 RX ADMIN — HYDROCHLOROTHIAZIDE 25 MG: 25 TABLET ORAL at 08:15

## 2022-02-28 RX ADMIN — SODIUM CHLORIDE, PRESERVATIVE FREE 10 ML: 5 INJECTION INTRAVENOUS at 08:17

## 2022-02-28 RX ADMIN — DOCUSATE SODIUM 50 MG AND SENNOSIDES 8.6 MG 2 TABLET: 8.6; 5 TABLET, FILM COATED ORAL at 08:16

## 2022-02-28 RX ADMIN — DESMOPRESSIN ACETATE 40 MG: 0.2 TABLET ORAL at 08:15

## 2022-02-28 RX ADMIN — METFORMIN HYDROCHLORIDE 1000 MG: 500 TABLET ORAL at 17:02

## 2022-02-28 ASSESSMENT — PAIN SCALES - GENERAL
PAINLEVEL_OUTOF10: 0

## 2022-02-28 ASSESSMENT — ENCOUNTER SYMPTOMS
BLURRED VISION: 0
SHORTNESS OF BREATH: 0
DOUBLE VISION: 0
ABDOMINAL PAIN: 0

## 2022-02-28 NOTE — CONSULTS
Physical Medicine & Rehabilitation  Consult Note      Admitting Physician:  Ngozi Galeana MD    Primary Care Provider:  Nahum Swann PA-C     Reason for Consult:  Acute Inpatient Rehabilitation    Chief Complaint:  Altered mental status    History of Present Illness:  Referring Provider is requesting an evaluation for appropriate placement upon discharge from acute care. History from chart review, patient, and patient's cousin. Finn Waters is a 80 y.o. female with history of CVA with residual left-sided deficits, dementia, HTN, HLD, type 2 diabetes, and gout admitted to [facility] on 2/17/2022. She initially presented with altered mental status. She was found to have an acute on chronic right subdural hematoma with midline shift. She underwent joceline hole placement for subdural hematoma evacuation on 2/19/22 (Dr. Gary Lerner). Subdural drain was discontinued on 2/24/22. Hospital course has been complicated by hypertension. She currently reports some pain in the right lower limb. She otherwise denies any acute concerns, including headache, changes in vision, numbness/tingling, weakness, and changes in bladder or bowel control. Review of Systems:  Review of Systems   Constitutional: Negative for fever. HENT: Negative for hearing loss. Eyes: Negative for blurred vision and double vision. Respiratory: Negative for shortness of breath. Cardiovascular: Negative for chest pain. Gastrointestinal: Negative for abdominal pain. No change in bowel control   Genitourinary:        No change in bladder control   Musculoskeletal:        +rigth lower limb pain   Skin: Negative for rash. Neurological: Negative for sensory change and headaches. Premorbid function:  Needing assistance with ADLs    Current function:    PT:  Restrictions/Precautions: Surgical Protocols,Fall Risk,Up as Tolerated  Other position/activity restrictions:  Activity order per Neurosurgery note on 2/24/22: \"Activity as Tolerated\". Skull incision present. Transfers  Sit to Stand: Maximum Assistance,2 Person Assistance  Stand to sit: Moderate Assistance,2 Person Assistance  Bed to Chair: Dependent/Total       Transfers  Sit to Stand: Maximum Assistance,2 Person Assistance  Stand to sit: Moderate Assistance,2 Person Assistance  Bed to Chair: Dependent/Total                    OT:   ADL  Feeding: Minimal assistance,Maximum assistance,Verbal cueing,Increased time to complete (Pt was initially able to feed herself (with spoon and cup with straw) with Min Assist (assist to scoop only). Pt then became increasingly fatigued and required Max Assist (hand over hand assist) and max Verbal Cues for self-feeding with spoon and cup.)  Grooming: Minimal assistance,Maximum assistance,Increased time to complete,Verbal cueing  UE Bathing: Setup,Verbal cueing,Increased time to complete,Maximum assistance (wash face seated EOB.)  LE Dressing: Maximum assistance,Verbal cueing,Increased time to complete (Max Assist to doff / don socks)  Additional Comments: Pt completed supine/sit transfer to seated EOB. Pt handed wash cloth and instructed to wash face. Pt unable to locate wash cloth, therapist placed in pt hand. Pt brought wash cloth to chin briefly then returned hand to lap and rubbed fingers on L hand with cloth. Pt instructed to wash face again, unable to comply requiring physical assistance to bring hand to face. Pt once again wiped chin then returned hand to lap. Sit/stand transfer using ramón stedy. Pt returned to supine in bed at end of session. Increased time needed to complete all tasks. Pt slow to respond with multiple verbal cues to keep eyes open and stay on task with poor/fair return. Balance  Sitting Balance: Minimal assistance (Seated EOB x6 minutes.  Verbal cues for hand placement with fair return.)  Standing Balance: Unable to assess(comment)   Standing Balance  Time: Max X2 with Celester Ao x2 trials (<30 sec each)  Comment: Attempt to stand, max A x2, in ramón campuzano x2, pt unable to come up into full extension. Functional Mobility  Functional Mobility Comments: Unable to assess this date. Bed mobility  Rolling to Left: Moderate assistance  Rolling to Right: Maximum assistance  Supine to Sit: Maximum assistance,2 Person assistance  Sit to Supine: Maximum assistance,2 Person assistance  Scooting: Maximal assistance  Comment: After sitting up edge of bed several minutes, she was leaned onto her left elbow several times. She sits easily up from leaning on left elbow in 2 different positions and leaning with arm partially tucked. Transfers  Sit to stand: Maximum assistance,2 Person assistance  Stand to sit: Maximum assistance,2 Person assistance  Transfer Comments: Attempt x2 to  ramón campuzano with poor return.                  SLP:      Past Medical History:        Diagnosis Date    Gout     Hyperlipidemia     Hypertension     Type II or unspecified type diabetes mellitus without mention of complication, not stated as uncontrolled        Past Surgical History:        Procedure Laterality Date    GILLIAN HOLE Right 02/19/2022    GILLIAN HOLE FOR HEMATOMA EVACUATION/EVDS to subdural space    CRANIOTOMY N/A 2/19/2022    GILLIAN HOLE FOR SUBDURAL HEMATOMA EVACUATION performed by Norma Redman MD at [facility] OR       Allergies:    No Known Allergies     Current Medications:   Current Facility-Administered Medications: amLODIPine (NORVASC) tablet 10 mg, 10 mg, Oral, Daily  enoxaparin (LOVENOX) injection 40 mg, 40 mg, SubCUTAneous, Daily  losartan (COZAAR) tablet 100 mg, 100 mg, Oral, Daily **AND** hydroCHLOROthiazide (HYDRODIURIL) tablet 25 mg, 25 mg, Oral, Daily  hydrALAZINE (APRESOLINE) injection 10 mg, 10 mg, IntraVENous, Q6H PRN  labetalol (NORMODYNE;TRANDATE) injection 10 mg, 10 mg, IntraVENous, Q4H PRN  sennosides-docusate sodium (SENOKOT-S) 8.6-50 MG tablet 2 tablet, 2 tablet, Oral, Daily  atorvastatin (LIPITOR) tablet 40 mg, 40 mg, Oral, Daily  sodium chloride flush 0.9 % injection 5-40 mL, 5-40 mL, IntraVENous, 2 times per day  sodium chloride flush 0.9 % injection 5-40 mL, 5-40 mL, IntraVENous, PRN  0.9 % sodium chloride infusion, 25 mL, IntraVENous, PRN  ondansetron (ZOFRAN-ODT) disintegrating tablet 4 mg, 4 mg, Oral, Q8H PRN **OR** ondansetron (ZOFRAN) injection 4 mg, 4 mg, IntraVENous, Q6H PRN  polyethylene glycol (GLYCOLAX) packet 17 g, 17 g, Oral, Daily PRN  acetaminophen (TYLENOL) tablet 650 mg, 650 mg, Oral, Q6H PRN  metFORMIN (GLUCOPHAGE) tablet 1,000 mg, 1,000 mg, Oral, BID WC **AND** [Held by provider] alogliptin (NESINA) tablet 12.5 mg, 12.5 mg, Oral, Daily    Family History:       Problem Relation Age of Onset    High Blood Pressure Mother        Social History:  Lives with:   Miladys Gutierrezer (who is her guardian), cousin's   Home setup:   Floors in home:  2. Bed/bathroom on floor:  main. Steps into home:  4. Social History     Socioeconomic History    Marital status: Single     Spouse name: Not on file    Number of children: Not on file    Years of education: Not on file    Highest education level: Not on file   Occupational History    Not on file   Tobacco Use    Smoking status: Former Smoker     Years: 20.00     Types: Cigarettes     Quit date: 1980     Years since quittin.8    Smokeless tobacco: Never Used   Substance and Sexual Activity    Alcohol use: Not on file    Drug use: Not on file    Sexual activity: Not on file   Other Topics Concern    Not on file   Social History Narrative    Not on file     Social Determinants of Health     Financial Resource Strain:     Difficulty of Paying Living Expenses: Not on file   Food Insecurity:     Worried About [address] in the Last Year: Not on file    [address] in the Last Year: Not on file   Transportation Needs:     Lack of Transportation (Medical): Not on file    Lack of Transportation (Non-Medical):  Not on file Physical Activity:     Days of Exercise per Week: Not on file    Minutes of Exercise per Session: Not on file   Stress:     Feeling of Stress : Not on file   Social Connections:     Frequency of Communication with Friends and Family: Not on file    Frequency of Social Gatherings with Friends and Family: Not on file    Attends [employer] Services: Not on file    Active Member of [address] [place] [address] [employer] or Organizations: Not on file    Attends Club or Organization Meetings: Not on file    Marital Status: Not on file   Intimate Partner Violence:     Fear of Current or Ex-Partner: Not on file    Emotionally Abused: Not on file    Physically Abused: Not on file    Sexually Abused: Not on file   Housing Stability:     Unable to Pay for Housing in the Last Year: Not on file    Number of [place] in the Last Year: Not on file    Unstable Housing in the Last Year: Not on file       Physical Exam:  /81   Pulse 87   Temp 97.8 °F (36.6 °C) (Oral)   Resp 15   Ht 5' 5\" (1.651 m)   Wt 147 lb (66.7 kg)   SpO2 100%   BMI 24.46 kg/m²     GEN: Well developed, well nourished, no acute distress  HEENT: NCAT. Scalp incision clean, dry, intact with staples in place. EOMI. Hearing grossly intact. Mucous membranes pink and moist.  RESP: Normal breath sounds with no wheezing, rales, or rhonchi. Respirations WNL and unlabored. CV: Regular rate and rhythm. No murmurs, rubs, or gallops. ABD: Soft, non-distended, BS+ and equal.  NEURO: Alert. Limited speech. Answers yes/no questions. No facial droop. Symmetrical shoulder shrug. Midline tongue protrusion. Sensation to light touch intact. MSK:  Muscle bulk is normal bilaterally. Strength 4/5 in bilateral upper limbs. Strength 4/5 with bilateral ankle dorsiflexion and plantarflexion. LIMBS: No edema in bilateral lower limbs. SKIN: Warm and dry with good turgor. PSYCH: Mood WNL. Flat affect. Appropriately interactive.     Diagnostics:    CBC:   Recent Labs 02/26/22 0429 02/27/22  0613 02/28/22  0548   WBC 8.6 7.1 7.0   RBC 3.80* 3.49* 3.40*   HGB 11.8* 11.0* 10.7*   HCT 37.5 32.9* 32.9*   MCV 98.7 94.3 96.8   RDW 16.1* 15.9* 15.9*    290 333     BMP:   Recent Labs     02/26/22 0429 02/27/22  0613 02/28/22  0548    142 139   K 4.1 3.9 3.5*   * 105 103   CO2 23 25 23   BUN 7* 8 6*   CREATININE 0.64 0.52 0.41*   GLUCOSE 82 86 78      HbA1c:   Lab Results   Component Value Date    LABA1C 5.9 10/19/2017     BNP: No results for input(s): BNP in the last 72 hours. PT/INR: No results for input(s): PROTIME, INR in the last 72 hours. APTT: No results for input(s): APTT in the last 72 hours. CARDIAC ENZYMES: No results for input(s): CKMB, CKMBINDEX, TROPONINT in the last 72 hours. Invalid input(s): CKTOTAL;3  FASTING LIPID PANEL:  Lab Results   Component Value Date    CHOL 137 10/19/2017    HDL 92 10/19/2017    TRIG 56 10/19/2017     LIVER PROFILE: No results for input(s): AST, ALT, ALB, BILIDIR, BILITOT, ALKPHOS in the last 72 hours. Radiology:  CT HEAD WO CONTRAST   Final Result   1. Stable volume acute on subacute right cerebral subdural hemorrhage with   unchanged positioning of right-sided extra-axial surgical drain. 2. Unchanged distribution and appearance of right cerebral scattered acute   subarachnoid hemorrhage. 3. Stable leftward midline shift at the level of the septum pellucidum   measuring 3 mm. 4. Right basal ganglia and thalamic multifocal remote lacunar infarcts. 5. Moderate cerebral white matter chronic microvascular ischemic disease. XR CHEST PORTABLE   Final Result   Slightly greater appearing changes left base, as above, but no other interval   change. No radiographic CHF or large pleural effusion. CT HEAD WO CONTRAST   Final Result   1. Stable volume of right cerebral acute on subacute subdural hemorrhage. 2. Unchanged leftward midline shift at the level of the septum pellucidum   measuring 3 mm.    3. Stable positioning right-sided extra-axial surgical drain. 4. Unchanged distribution and appearance of right frontal, insular and   temporal lobe scattered acute subarachnoid hemorrhage. 5. Moderate cerebral white matter chronic microvascular ischemic disease. 6. Mild diffuse cerebral atrophy. 7. Redemonstration anterior right thalamic remote infarct. 8. Redemonstration bilateral basal ganglia multifocal remote lacunar infarcts. XR CHEST PORTABLE   Final Result   Interval improvement of vascular congestion. CT Head wo Contrast   Final Result   Right joceline hole and subdural drain placement for evacuation of right subdural   hematoma. Interval reduction of hematoma and resolution of midline shift. New subarachnoid hemorrhage. XR SKULL (<4 VIEWS)   Final Result   Recent postop changes of the calvarium as noted above. CT HEAD WO CONTRAST   Final Result   Stable examination. CT head WO contrast   Final Result   Redemonstration of a right frontoparietal subdural hematoma measuring 14 mm   in thickness resulting in 4 mm of right to left midline shift, not   significantly changed from the previous exam.      No new hemorrhage or acute infarct identified. XR CHEST PORTABLE   Final Result      Mild to moderate cardiomegaly and findings suggestive of pulmonary edema with   small left pleural effusion. CT Head WO Contrast   Final Result   Addendum 1 of 1   ADDENDUM:   Case discussed with Dr. Mira sykes at 10:24 p.m. Final   Acute on chronic right subdural hematoma with midline shift as above      The findings were sent to the Radiology Results [address] at 10:18   pm on 2/17/2022to be communicated to a licensed caregiver. CT HEAD WO CONTRAST    (Results Pending)         Impression:    1. Acute on chronic right subdural hematoma s/p joceline hole placement on 2/19  2. Anemia  3. HTN  4. HLD  5.  Type 2 diabetes  6. Gout  7. Dementia  8. History of CVA with residual left-sided deficits    Recommendations:    1. Diagnosis:  Acute on chronic right subdural hematoma s/p joceline hole placement  2. Therapy: Has PT/OT needs  3. Medical Necessity: As above  4. Support: Lives with cousin (guardian) and cousin's   11. Rehab Recommendation:  The patient does not meet criteria for acute inpatient rehabilitation at this time, as I do not think that she will be able to tolerate 3 hours of therapy per day. Additionally, she may need longer-term rehabilitation than can be provided at acute rehab. Would recommend lower level of rehab. 6. DVT Prophylaxis: Lovenox    It was my pleasure to evaluate Josue Gomez today. Please call with questions.     Jj Jensen MD

## 2022-02-28 NOTE — PROGRESS NOTES
[facility]  Internal Medicine Teaching Residency Program  Inpatient Daily Progress Note  ______________________________________________________________________________    Patient: Elizabeth Jaimes  YOB: 1940   [id number]    Acct: [de-identified]     Room: [address] [place] [address]  Admit date: 2/17/2022  Today's date: 02/28/22  Number of days in the hospital: 10    SUBJECTIVE   Admitting Diagnosis: Subdural hematoma ([facility] [address]  CC: AMS  Pt examined at bedside. Chart & results reviewed. Patient hemodynamically stable, afebrile  k 3.5 - replaced; CBC stable  Awaiting placement    ROS:  Constitutional:  negative for chills, fevers, sweats  Respiratory:  negative for cough, dyspnea on exertion, hemoptysis, shortness of breath, wheezing  Cardiovascular:  negative for chest pain, chest pressure/discomfort, lower extremity edema, palpitations  Gastrointestinal:  negative for abdominal pain, constipation, diarrhea, nausea, vomiting  Neurological:  negative for dizziness, headache  BRIEF HISTORY     patient is a 81 y.o. female with past medical history significant for HTN, DM2, HLD, gout, CVA 08/21 who was initially admitted on 2/17/2022 with Subdural hematoma ([address] [Q38.8J9V]  SDH (subdural hematoma) ([facility] [address] [M52.2H1N] UUO presented with AMS. Last known well one day before admission. H/o dementia - At baseline patient is able to recognize family members but not AO to time or place. Yamila Jurist onto her bottom one month ago.  Not compliant with aspirin     In the ER, imaging showed acute on chronic subdural hematoma 15 mm with 4 mm R to L shift.      In the ICU, patient underwent R joceline hole with subdural evacuation 02/19/22      Currently, patient is hypertensive - uncontrolled; increased hctz to 25 mg qd    OBJECTIVE     Vital Signs:  /68   Pulse 73   Temp 98.8 °F (37.1 °C)   Resp 18   Ht 5' 5\" (1.651 m)   Wt 147 lb (66.7 kg)   SpO2 90%   BMI 24.46 kg/m²     Temp (24hrs), Av.5 °F (36.9 °C), Min:97.6 °F (36.4 °C), Max:99.5 °F (37.5 °C)    In: 375   Out: 700 [Urine:700]    Physical Exam:  Constitutional: This is a well developed, well nourished, 25-29.9 - Overweight 80y.o. year old female who is not alert, oriented. She is in no apparent distress. Head:normocephalic and atraumatic. EENT:  PERRLA. No thrush was noted. Neck: Supple without thyromegaly. No elevated JVP. Respiratory: Chest was symmetrical without dullness to percussion. Breath sounds bilaterally were clear to auscultation. There were no wheezes, rhonchi or rales. Cardiovascular: Regular without murmur, clicks, gallops or rubs. Abdomen: Slightly rounded and soft without organomegaly. No rebound, rigidity or guarding was appreciated. Lymphatic: No lymphadenopathy. Extremities:  No lower extremity edema, ulcerations, varicosities or erythema. Muscle size, tone and strength are normal.  No involuntary movements are noted. Skin:  Warm and dry. Good color, turgor and pigmentation. No lesions or scars.   No cyanosis or clubbing  Neurological/Psychiatric: AO X 0; L > R sided weakness    Medications:  Scheduled Medications:    amLODIPine  10 mg Oral Daily    enoxaparin  40 mg SubCUTAneous Daily    hydroCHLOROthiazide  25 mg Oral Daily    And    losartan  100 mg Oral Daily    sennosides-docusate sodium  2 tablet Oral Daily    atorvastatin  40 mg Oral Daily    sodium chloride flush  5-40 mL IntraVENous 2 times per day    metFORMIN  1,000 mg Oral BID WC    And    [Held by provider] alogliptin  12.5 mg Oral Daily     Continuous Infusions:    sodium chloride       PRN MedicationshydrALAZINE, 10 mg, Q6H PRN  labetalol, 10 mg, Q4H PRN  sodium chloride flush, 5-40 mL, PRN  sodium chloride, 25 mL, PRN  ondansetron, 4 mg, Q8H PRN   Or  ondansetron, 4 mg, Q6H PRN  polyethylene glycol, 17 g, Daily PRN  acetaminophen, 650 mg, Q6H PRN        Diagnostic Labs:  CBC:   Recent Labs     22  8883 02/27/22  0613 02/28/22  0548   WBC 8.6 7.1 7.0   RBC 3.80* 3.49* 3.40*   HGB 11.8* 11.0* 10.7*   HCT 37.5 32.9* 32.9*   MCV 98.7 94.3 96.8   RDW 16.1* 15.9* 15.9*    290 333     BMP:   Recent Labs     02/26/22  0429 02/27/22  0613 02/28/22  0548    142 139   K 4.1 3.9 3.5*   * 105 103   CO2 23 25 23   BUN 7* 8 6*   CREATININE 0.64 0.52 0.41*     BNP: No results for input(s): BNP in the last 72 hours. PT/INR: No results for input(s): PROTIME, INR in the last 72 hours. APTT: No results for input(s): APTT in the last 72 hours. CARDIAC ENZYMES: No results for input(s): CKMB, CKMBINDEX, TROPONINI in the last 72 hours. Invalid input(s): CKTOTAL;3  FASTING LIPID PANEL:  Lab Results   Component Value Date    CHOL 137 10/19/2017    HDL 92 10/19/2017    TRIG 56 10/19/2017     LIVER PROFILE: No results for input(s): AST, ALT, ALB, BILIDIR, BILITOT, ALKPHOS in the last 72 hours. MICROBIOLOGY:   Lab Results   Component Value Date/Time    CULTURE KLEBSIELLA PNEUMONIAE 10 to 50,000 CFU/ML (A) 02/19/2022 08:21 AM       Imaging:    CT HEAD WO CONTRAST    Result Date: 2/24/2022  1. Stable volume acute on subacute right cerebral subdural hemorrhage with unchanged positioning of right-sided extra-axial surgical drain. 2. Unchanged distribution and appearance of right cerebral scattered acute subarachnoid hemorrhage. 3. Stable leftward midline shift at the level of the septum pellucidum measuring 3 mm. 4. Right basal ganglia and thalamic multifocal remote lacunar infarcts. 5. Moderate cerebral white matter chronic microvascular ischemic disease. CT HEAD WO CONTRAST    Result Date: 2/22/2022  1. Stable volume of right cerebral acute on subacute subdural hemorrhage. 2. Unchanged leftward midline shift at the level of the septum pellucidum measuring 3 mm. 3. Stable positioning right-sided extra-axial surgical drain.  4. Unchanged distribution and appearance of right frontal, insular and temporal lobe scattered acute subarachnoid hemorrhage. 5. Moderate cerebral white matter chronic microvascular ischemic disease. 6. Mild diffuse cerebral atrophy. 7. Redemonstration anterior right thalamic remote infarct. 8. Redemonstration bilateral basal ganglia multifocal remote lacunar infarcts. XR CHEST PORTABLE    Result Date: 2/22/2022  Slightly greater appearing changes left base, as above, but no other interval change. No radiographic CHF or large pleural effusion. ASSESSMENT & PLAN     ASSESSMENT / PLAN:     Principal Problem:    Subdural hematoma (HCC)  Active Problems:    Essential hypertension    Type 2 diabetes mellitus without complication, without long-term current use of insulin (HCC)    Mixed hyperlipidemia    S/P evacuation of subdural hematoma  Resolved Problems:    * No resolved hospital problems. *    1. Acute on chronic subdural hematoma s/p R joceline hole with subdural evacuation 02/19/22; subdural drain removal on 02/24. On keppra 500 mg bid per neuro-crit care. Goal SBP < 160 per neurology. Okay for DVT and PT/OT per neuro-surgery; f/u in 2 weeks for staple removal and repeat CT head  2. Essential hypertension - uncontrolled - on cozaar 100 mg qd, hctz 25 mg qd, norvasc increased to 10 mg qd  3. Type 2 DM - controlled - on metformin 1g bid and HELD alogliptin 12.5 mg qd     DVT prophylaxis - lovenox  GI prophylaxis - not indicated   Consulted PT/OT and     Uriah Bryant MD  Internal Medicine Resident, PGY-1  [address];  [place], [place]  2/28/2022, 7:16 AM

## 2022-02-28 NOTE — PLAN OF CARE
Problem: Falls - Risk of:  Goal: Will remain free from falls  Description: Will remain free from falls  Outcome: Ongoing  Goal: Absence of physical injury  Description: Absence of physical injury  Outcome: Ongoing     Problem: Infection:  Goal: Will remain free from infection  Description: Will remain free from infection  Outcome: Ongoing     Problem: Safety:  Goal: Free from accidental physical injury  Description: Free from accidental physical injury  Outcome: Ongoing  Goal: Free from intentional harm  Description: Free from intentional harm  Outcome: Ongoing     Problem: Daily Care:  Goal: Daily care needs are met  Description: Daily care needs are met  Outcome: Ongoing     Problem: Pain:  Goal: Patient's pain/discomfort is manageable  Description: Patient's pain/discomfort is manageable  Outcome: Ongoing  Goal: Pain level will decrease  Description: Pain level will decrease  Outcome: Ongoing  Goal: Control of acute pain  Description: Control of acute pain  Outcome: Ongoing  Goal: Control of chronic pain  Description: Control of chronic pain  Outcome: Ongoing     Problem: Skin Integrity:  Goal: Skin integrity will stabilize  Description: Skin integrity will stabilize  Outcome: Ongoing     Problem: Discharge Planning:  Goal: Patients continuum of care needs are met  Description: Patients continuum of care needs are met  Outcome: Ongoing     Problem: HEMODYNAMIC STATUS  Goal: Patient has stable vital signs and fluid balance  Outcome: Ongoing     Problem: ACTIVITY INTOLERANCE/IMPAIRED MOBILITY  Goal: Mobility/activity is maintained at optimum level for patient  Outcome: Ongoing     Problem: COMMUNICATION IMPAIRMENT  Goal: Ability to express needs and understand communication  Outcome: Ongoing     Problem: Non-Violent Restraints  Goal: Removal from restraints as soon as assessed to be safe  Outcome: Ongoing  Goal: No harm/injury to patient while restraints in use  Outcome: Ongoing     Problem: Skin Integrity:  Goal: Will show no infection signs and symptoms  Description: Will show no infection signs and symptoms  Outcome: Ongoing  Goal: Absence of new skin breakdown  Description: Absence of new skin breakdown  Outcome: Ongoing     Problem: IP BALANCE  Goal: BALANCE EDUCATION  Description: Educate patients on maintaining dynamic/static standing/sitting balance, with/without upper extremity support. Outcome: Ongoing     Problem: IP MOBILITY  Goal: LTG - patient will ambulate household distance  Outcome: Ongoing     Problem: IP BALANCE  Goal: BALANCE EDUCATION  Description: Educate patients on maintaining dynamic/static standing/sitting balance, with/without upper extremity support.   Outcome: Ongoing     Problem: IP TRANSFERS  Goal: STG - Patient to transfer to and from sit to supine  Outcome: Ongoing     Problem: Nutrition  Goal: Optimal nutrition therapy  Outcome: Ongoing

## 2022-02-28 NOTE — CARE COORDINATION
[place] [facility] [place] [facility] Flow/Interdisciplinary Rounds Progress Note    Quality Flow Rounds held on February 28, 2022 at [address] [facility] Attending:  Bedside Nurse, ,  and Nursing Unit Leadership    Barriers to Discharge: placement     Anticipated Discharge Date:  Expected Discharge Date: 02/28/22    Anticipated Discharge Disposition: SNF- [facility] Reviewing. Readmission Risk              Risk of Unplanned Readmission:  14           Discussed patient goal for the day, patient clinical progression, and barriers to discharge. The following Goal(s) of the Day/Commitment(s) have been identified:  SNF Discharge - Check H&P Update, Medication Reconciliation, and Transfer Form    Spoke with Chris; will need to do onsite. Patient needs to be restraint free for 24hrs. Will need to clarify how long patient has been restraint free. 200 Spoke with patient's guardian at bedside; home goal but agreeable to SNF for rehab. Spoke with Chris at [facility]; precert initiated.        Valdez Francisco RN  February 28, 2022

## 2022-03-01 LAB
ABSOLUTE EOS #: 0.31 K/UL (ref 0–0.44)
ABSOLUTE IMMATURE GRANULOCYTE: 0.04 K/UL (ref 0–0.3)
ABSOLUTE LYMPH #: 1.73 K/UL (ref 1.1–3.7)
ABSOLUTE MONO #: 0.77 K/UL (ref 0.1–1.2)
ANION GAP SERPL CALCULATED.3IONS-SCNC: 14 MMOL/L (ref 9–17)
BASOPHILS # BLD: 1 % (ref 0–2)
BASOPHILS ABSOLUTE: 0.04 K/UL (ref 0–0.2)
BUN BLDV-MCNC: 8 MG/DL (ref 8–23)
CALCIUM SERPL-MCNC: 9.3 MG/DL (ref 8.6–10.4)
CHLORIDE BLD-SCNC: 99 MMOL/L (ref 98–107)
CO2: 24 MMOL/L (ref 20–31)
CREAT SERPL-MCNC: 0.47 MG/DL (ref 0.5–0.9)
EOSINOPHILS RELATIVE PERCENT: 5 % (ref 1–4)
GFR AFRICAN AMERICAN: >60 ML/MIN
GFR NON-AFRICAN AMERICAN: >60 ML/MIN
GFR SERPL CREATININE-BSD FRML MDRD: ABNORMAL ML/MIN/{1.73_M2}
GLUCOSE BLD-MCNC: 80 MG/DL (ref 70–99)
HCT VFR BLD CALC: 34 % (ref 36.3–47.1)
HEMOGLOBIN: 11.5 G/DL (ref 11.9–15.1)
IMMATURE GRANULOCYTES: 1 %
LYMPHOCYTES # BLD: 26 % (ref 24–43)
MCH RBC QN AUTO: 31.6 PG (ref 25.2–33.5)
MCHC RBC AUTO-ENTMCNC: 33.8 G/DL (ref 28.4–34.8)
MCV RBC AUTO: 93.4 FL (ref 82.6–102.9)
MONOCYTES # BLD: 12 % (ref 3–12)
NRBC AUTOMATED: 0 PER 100 WBC
PDW BLD-RTO: 15.7 % (ref 11.8–14.4)
PLATELET # BLD: 375 K/UL (ref 138–453)
PMV BLD AUTO: 9.7 FL (ref 8.1–13.5)
POTASSIUM SERPL-SCNC: 3.7 MMOL/L (ref 3.7–5.3)
RBC # BLD: 3.64 M/UL (ref 3.95–5.11)
RBC # BLD: ABNORMAL 10*6/UL
SEG NEUTROPHILS: 55 % (ref 36–65)
SEGMENTED NEUTROPHILS ABSOLUTE COUNT: 3.66 K/UL (ref 1.5–8.1)
SODIUM BLD-SCNC: 137 MMOL/L (ref 135–144)
WBC # BLD: 6.6 K/UL (ref 3.5–11.3)

## 2022-03-01 PROCEDURE — 6370000000 HC RX 637 (ALT 250 FOR IP): Performed by: NURSE PRACTITIONER

## 2022-03-01 PROCEDURE — 2060000000 HC ICU INTERMEDIATE R&B

## 2022-03-01 PROCEDURE — 36415 COLL VENOUS BLD VENIPUNCTURE: CPT

## 2022-03-01 PROCEDURE — 99232 SBSQ HOSP IP/OBS MODERATE 35: CPT | Performed by: INTERNAL MEDICINE

## 2022-03-01 PROCEDURE — 2580000003 HC RX 258: Performed by: REGISTERED NURSE

## 2022-03-01 PROCEDURE — 85025 COMPLETE CBC W/AUTO DIFF WBC: CPT

## 2022-03-01 PROCEDURE — 6360000002 HC RX W HCPCS

## 2022-03-01 PROCEDURE — 97535 SELF CARE MNGMENT TRAINING: CPT

## 2022-03-01 PROCEDURE — 6370000000 HC RX 637 (ALT 250 FOR IP): Performed by: REGISTERED NURSE

## 2022-03-01 PROCEDURE — 97530 THERAPEUTIC ACTIVITIES: CPT

## 2022-03-01 PROCEDURE — 80048 BASIC METABOLIC PNL TOTAL CA: CPT

## 2022-03-01 PROCEDURE — 97110 THERAPEUTIC EXERCISES: CPT

## 2022-03-01 PROCEDURE — 6370000000 HC RX 637 (ALT 250 FOR IP): Performed by: STUDENT IN AN ORGANIZED HEALTH CARE EDUCATION/TRAINING PROGRAM

## 2022-03-01 PROCEDURE — 6370000000 HC RX 637 (ALT 250 FOR IP)

## 2022-03-01 RX ADMIN — AMLODIPINE BESYLATE 10 MG: 10 TABLET ORAL at 08:05

## 2022-03-01 RX ADMIN — LOSARTAN POTASSIUM 100 MG: 50 TABLET, FILM COATED ORAL at 08:05

## 2022-03-01 RX ADMIN — METFORMIN HYDROCHLORIDE 1000 MG: 500 TABLET ORAL at 08:04

## 2022-03-01 RX ADMIN — SODIUM CHLORIDE, PRESERVATIVE FREE 10 ML: 5 INJECTION INTRAVENOUS at 08:06

## 2022-03-01 RX ADMIN — DESMOPRESSIN ACETATE 40 MG: 0.2 TABLET ORAL at 08:05

## 2022-03-01 RX ADMIN — DOCUSATE SODIUM 50 MG AND SENNOSIDES 8.6 MG 2 TABLET: 8.6; 5 TABLET, FILM COATED ORAL at 08:05

## 2022-03-01 RX ADMIN — HYDROCHLOROTHIAZIDE 25 MG: 25 TABLET ORAL at 08:05

## 2022-03-01 RX ADMIN — METFORMIN HYDROCHLORIDE 1000 MG: 500 TABLET ORAL at 15:07

## 2022-03-01 RX ADMIN — ENOXAPARIN SODIUM 40 MG: 100 INJECTION SUBCUTANEOUS at 08:05

## 2022-03-01 RX ADMIN — SODIUM CHLORIDE, PRESERVATIVE FREE 10 ML: 5 INJECTION INTRAVENOUS at 21:00

## 2022-03-01 ASSESSMENT — PAIN SCALES - GENERAL
PAINLEVEL_OUTOF10: 0

## 2022-03-01 NOTE — PLAN OF CARE
Problem: Falls - Risk of:  Goal: Will remain free from falls  Description: Will remain free from falls  Outcome: Ongoing  Goal: Absence of physical injury  Description: Absence of physical injury  Outcome: Ongoing     Problem: Infection:  Goal: Will remain free from infection  Description: Will remain free from infection  Outcome: Ongoing     Problem: Safety:  Goal: Free from accidental physical injury  Description: Free from accidental physical injury  Outcome: Ongoing  Goal: Free from intentional harm  Description: Free from intentional harm  Outcome: Ongoing     Problem: Daily Care:  Goal: Daily care needs are met  Description: Daily care needs are met  Outcome: Ongoing     Problem: Pain:  Goal: Patient's pain/discomfort is manageable  Description: Patient's pain/discomfort is manageable  Outcome: Ongoing  Goal: Pain level will decrease  Description: Pain level will decrease  Outcome: Ongoing  Goal: Control of acute pain  Description: Control of acute pain  Outcome: Ongoing  Goal: Control of chronic pain  Description: Control of chronic pain  Outcome: Ongoing     Problem: Skin Integrity:  Goal: Skin integrity will stabilize  Description: Skin integrity will stabilize  Outcome: Ongoing     Problem: Discharge Planning:  Goal: Patients continuum of care needs are met  Description: Patients continuum of care needs are met  Outcome: Ongoing     Problem: HEMODYNAMIC STATUS  Goal: Patient has stable vital signs and fluid balance  Outcome: Ongoing     Problem: ACTIVITY INTOLERANCE/IMPAIRED MOBILITY  Goal: Mobility/activity is maintained at optimum level for patient  Outcome: Ongoing     Problem: COMMUNICATION IMPAIRMENT  Goal: Ability to express needs and understand communication  Outcome: Ongoing     Problem: Skin Integrity:  Goal: Will show no infection signs and symptoms  Description: Will show no infection signs and symptoms  Outcome: Ongoing  Goal: Absence of new skin breakdown  Description: Absence of new skin breakdown  Outcome: Ongoing     Problem: IP BALANCE  Goal: BALANCE EDUCATION  Description: Educate patients on maintaining dynamic/static standing/sitting balance, with/without upper extremity support. Outcome: Ongoing     Problem: IP MOBILITY  Goal: LTG - patient will ambulate household distance  Outcome: Ongoing     Problem: IP BALANCE  Goal: BALANCE EDUCATION  Description: Educate patients on maintaining dynamic/static standing/sitting balance, with/without upper extremity support.   Outcome: Ongoing     Problem: IP TRANSFERS  Goal: STG - Patient to transfer to and from sit to supine  Outcome: Ongoing     Problem: Nutrition  Goal: Optimal nutrition therapy  Outcome: Ongoing

## 2022-03-01 NOTE — CARE COORDINATION
Discharge [facility] Case Management Department  Written by: Constantin Laura RN    Patient Name: Kamala Padgett  Attending Provider: Corrina Alarcon MD  Admit Date: 2022  9:09 PM  MRN: [id number]  Account: [de-identified]                     : 1940  Discharge Date:  22        Disposition: SNF; [place] via stretcher. Patient's guardian notified of plan. Spoke with Chris; precert pending. Updated PT/OT notes requested. PT/OT have worked with patient this morning. Pickup @ 0800  Report # [phone]    3582 patient's room at facility cannot be cleaned until tomorrow. Family not agreeable for patient to go to long term bed and then moved to short term bed d/t patient's dementia. Transportation moved to tomorrow at 0800. Updated AVS printed and faxed to facility.      Constantin Laura RN

## 2022-03-01 NOTE — DISCHARGE INSTR - COC
behavioral disturbance (HCC) F03.91    Dementia with behavioral problem (HCC) F03.91    Subdural hematoma ([facility] [address] S06.5X9A    S/P evacuation of subdural hematoma Z98.890, Z86.79       Isolation/Infection:   Isolation            No Isolation          Patient Infection Status       Infection Onset Added Last Indicated Last Indicated By Review Planned Expiration Resolved Resolved By    None active    Resolved    COVID-19 (Rule Out) 02/17/22 02/17/22 02/17/22 COVID-19, Rapid (Ordered)   02/17/22 Rule-Out Test Resulted            Nurse Assessment:  Last Vital Signs: BP (!) 152/81   Pulse 88   Temp 98 °F (36.7 °C)   Resp 17   Ht 5' 5\" (1.651 m)   Wt 147 lb (66.7 kg)   SpO2 98%   BMI 24.46 kg/m²     Last documented pain score (0-10 scale): Pain Level: 0  Last Weight:   Wt Readings from Last 1 Encounters:   02/27/22 147 lb (66.7 kg)     Mental Status:  disoriented and alert    IV Access:  - None    Nursing Mobility/ADLs:  Walking   Dependent  Transfer  Dependent  Bathing  Dependent  Dressing  Dependent  Toileting  Dependent  Feeding  Dependent  Med Admin  Dependent  Med Delivery   crushed    Wound Care Documentation and Therapy:        Elimination:  Continence: Bowel: No  Bladder: No  Urinary Catheter: None   Colostomy/Ileostomy/Ileal Conduit: No       Date of Last BM: 2/28/2022    Intake/Output Summary (Last 24 hours) at 3/1/2022 0729  Last data filed at 2/28/2022 1010  Gross per 24 hour   Intake 125 ml   Output 350 ml   Net -225 ml     I/O last 3 completed shifts: In: 125 [P.O.:125]  Out: 350 [Urine:350]    Safety Concerns: At Risk for Falls    Impairments/Disabilities:      None    Nutrition Therapy:  Current Nutrition Therapy:   - Oral Diet:  General    Routes of Feeding: Oral  Liquids:  Thin Liquids  Daily Fluid Restriction: no  Last Modified Barium Swallow with Video (Video Swallowing Test): not done    Treatments at the Time of Hospital Discharge:   Respiratory Treatments: none  Oxygen Therapy:  is not on home oxygen therapy. Ventilator:    - No ventilator support    Rehab Therapies: Physical Therapy and Occupational Therapy  Weight Bearing Status/Restrictions: No weight bearing restirctions  Other Medical Equipment (for information only, NOT a DME order):  wheelchair  Other Treatments: none    Patient's personal belongings (please select all that are sent with patient):  None    RN SIGNATURE:  Electronically signed by Denisse Dupree RN on 3/1/22 at 8:49 AM EST    CASE MANAGEMENT/SOCIAL WORK SECTION    Inpatient Status Date: 2/18/2022    Readmission Risk Assessment Score:  Readmission Risk              Risk of Unplanned Readmission:  14           Discharging to Facility/ Agency   Name: [facility]  Address: [place] [address]  Phone: [phone]  JRI:[phone]      / signature: Electronically signed by Nuria Goetz RN on 3/1/22 at 8:38 AM EST    PHYSICIAN SECTION    Prognosis: Fair    Condition at Discharge: Stable    Rehab Potential (if transferring to Rehab): Fair    Recommended Labs or Other Treatments After Discharge: Monitor vitals  Monitor neurochecks  Follow-up with neurosurgery  Dr Jane Ribeiro in 1 week    Physician Certification: I certify the above information and transfer of Yassine Hackett  is necessary for the continuing treatment of the diagnosis listed and that she requires [facility] for greater 30 days.      Update Admission H&P: No change in H&P    PHYSICIAN SIGNATURE:  Electronically signed by  Noah Tolliver MD on 3/1/22 at 7:29 AM EST

## 2022-03-01 NOTE — PROGRESS NOTES
Comprehensive Nutrition Assessment    Type and Reason for Visit:  Reassess    Nutrition Recommendations/Plan:    - Continue current diet. Assist with feedings as able. - Modify ONS: Try Ensure Clear ONS. Nutrition Assessment:  Spoke with RN who reports pt continues to eat poorly. Does better with fed by staff and requires frequent instruction and encouragement to Juan 80. Not drinking much of Ensure ONS. Malnutrition Assessment:  Malnutrition Status:  Insufficient data    Context:  Chronic Illness     Findings of the 6 clinical characteristics of malnutrition:  Energy Intake:  Mild decrease in energy intake (Comment) (since admission; unknown PTA)  Weight Loss:  Unable to assess     Body Fat Loss:  Unable to assess     Muscle Mass Loss:  Unable to assess    Fluid Accumulation:  No significant fluid accumulation     Strength:  Not Performed    Estimated Daily Nutrient Needs:  Energy (kcal):  2626-9360 kcals/day; Weight Used for Energy Requirements:  Current     Protein (g):  65-80 gm/day; Weight Used for Protein Requirements:  Current (1-1.2)        Fluid (ml/day):  25 mL/kg = ~1700 mL/day or per MD; Method Used for Fluid Requirements:  ml/Kg      Nutrition Related Findings:  meds/labs reviewed      Wounds:  Surgical Incision       Current Nutrition Therapies:    ADULT DIET; Regular  ADULT ORAL NUTRITION SUPPLEMENT; Breakfast, Dinner; Standard High Calorie/High Protein Oral Supplement    Anthropometric Measures:  · Height: 5' 5\" (165.1 cm)  · Current Body Weight: 147 lb 0.8 oz (66.7 kg)   · Ideal Body Weight: 125 lbs; % Ideal Body Weight 117.6 %   · BMI: 24.5  · BMI Categories: Normal Weight (BMI 18.5-24. 9)       Nutrition Diagnosis:   · Inadequate oral intake related to cognitive or neurological impairment (dementia, appetite) as evidenced by intake 0-25%      Nutrition Interventions:   Food and/or Nutrient Delivery:  Continue Current Diet,Modify Oral Nutrition Supplement  Nutrition

## 2022-03-01 NOTE — PLAN OF CARE
Problem: Falls - Risk of:  Goal: Will remain free from falls  Description: Will remain free from falls  3/1/2022 1320 by Wilfred Moya RN  Outcome: Completed  3/1/2022 0432 by Marko Kocher, RN  Outcome: Ongoing  Goal: Absence of physical injury  Description: Absence of physical injury  3/1/2022 1320 by Wilfred Moya RN  Outcome: Completed  3/1/2022 0432 by Marko Kocher, RN  Outcome: Ongoing

## 2022-03-01 NOTE — PROGRESS NOTES
Occupational Therapy  Facility/Department: [address] STEP DOWN  Daily Treatment Note  NAME: Boni Seo  : 1940  MRN: [id number]    Date of Service: 3/1/2022    Discharge Recommendations:  Patient would benefit from continued therapy after discharge       Assessment   Performance deficits / Impairments: Decreased endurance;Decreased coordination;Decreased ADL status; Decreased ROM; Decreased balance;Decreased strength;Decreased safe awareness;Decreased functional mobility ; Decreased cognition;Decreased vision/visual deficit; Decreased posture  Prognosis: Fair  OT Education: OT Role;Orientation;Plan of Care  REQUIRES OT FOLLOW UP: Yes  Activity Tolerance  Activity Tolerance: Treatment limited secondary to decreased cognition  Safety Devices  Safety Devices in place: Yes  Type of devices: Call light within reach;Gait belt;Left in bed;Nurse notified; Bed alarm in place  Restraints  Initially in place: No         Patient Diagnosis(es): The primary encounter diagnosis was Subdural hematoma ([facility] [address] Diagnoses of S/P evacuation of subdural hematoma, Type 2 diabetes mellitus without complication, without long-term current use of insulin ([facility] [address] and Essential hypertension were also pertinent to this visit. has a past medical history of Gout, Hyperlipidemia, Hypertension, and Type II or unspecified type diabetes mellitus without mention of complication, not stated as uncontrolled. has a past surgical history that includes [facility] (Right, 2022) and craniotomy (N/A, 2022). Restrictions  Restrictions/Precautions  Restrictions/Precautions: Surgical Protocols,Fall Risk,Up as Tolerated  Required Braces or Orthoses?: No  Position Activity Restriction  Other position/activity restrictions: Activity order per Neurosurgery note on 22:  \"Activity as Tolerated\". Skull incision present.   Subjective   General  Chart Reviewed: Yes  Patient assessed for rehabilitation services?: Yes  Family / Caregiver Present: Yes (Daughter)  Diagnosis: SDH.  s/p Darlene Holes (2/19/22). Subjective  Subjective: RN approved Pt to be seen for OT / PT treatment  Patient Currently in Pain: No      Orientation  Orientation  Overall Orientation Status: Impaired  Orientation Level: Oriented to person;Disoriented to place; Disoriented to time;Disoriented to situation  Objective    ADL  Feeding: Minimum assistance; Increased time to complete;Verbal cueing;Setup (assist needed to place cup of applesauce in hand to initiate feeding, able to scoop and bring food to mouth, daughter stated opened containers)  Grooming: Moderate assistance;Setup;Verbal cueing; Increased time to complete ( SBA to washed face and brushed teeth, Mod assist to open and squeeze tooth paste on toothbrush and vc's and physical assist to initiate task)  LE Dressing: Maximum assistance; Increased time to complete;Verbal cueing;Setup (writer don/doff footies)   Additional Comments: Pt in bed upon arrival, on room air throughout treatment. Tray table already setup across pt for breakfast upon arrival. Pt transferred to EOB for grooming w/ increased time and assistance d/t confusion, fatigue, and weakness. Max verbal cueing for initiation of tasks and hand placement. Pt returned to bed w/ HOB elevated at end of session.         Balance  Sitting Balance: Minimal assistance (Pt sat EOB ~ 25 mins to complete grooming w/ fluctuating sitting balance Min to CGA)  Standing Balance: AVA full standing balance, d/t pt was unable to fully stand upright in SS  Time:  w/ ramón campuzano 3 times ~30 secs each  Comment: Pt unable to fully extend legs and trunk  Functional Mobility  Functional - Mobility Device:  Cici Lindsey)  Assist Level:  (x2)  Bed mobility  Rolling to Right: Maximum assistance  Supine to Sit: Maximum assistance;2 Person assistance (Assist w/ BLE and trunk progression)  Sit to Supine: Maximum assistance;2 Person assistance (assist w/ trunk and BLE progression)  Scooting: Maximal assistance  Comment: Pt required max vc's for hand placement and sequencing w/ fair return  Transfers  Sit to stand: Maximum assistance;2 Person assistance  Stand to sit: Moderate assistance;2 Person assistance  Transfer Comments: w/ use of Hieu Sims, unable to fully extend legs and trunk when standing     Cognition  Overall Cognitive Status: Exceptions  Arousal/Alertness: Delayed responses to stimuli  Following Commands: Inconsistently follows commands  Attention Span: Unable to maintain attention  Memory: Decreased recall of recent events  Safety Judgement: Decreased awareness of need for assistance;Decreased awareness of need for safety  Problem Solving: Decreased awareness of errors  Insights: Not aware of deficits  Initiation: Requires cues for all  Sequencing: Requires cues for all  Cognition Comment: Pt required Max Verbal and Tactile Cues for task initiation / sequencing. Plan   Plan  Times per week: 4-5x/week  Times per day: Daily  Current Treatment Recommendations: Strengthening,Patient/Caregiver Education & Training,Home Management Training,Equipment Evaluation, Education, & procurement,Balance Training,Neuromuscular Re-education,Functional Mobility Training,Endurance Training,Pain Management,Safety Education & Training,Self-Care / ADL    AM-[facility] Score  AM-[facility] Inpatient Daily Activity Raw Score: 10 (03/01/22 0947)  AM-[facility] Inpatient ADL T-Scale Score : 27.31 (03/01/22 0947)  ADL Inpatient CMS 0-100% Score: 74.7 (03/01/22 0947)  ADL Inpatient CMS G-Code Modifier : CL (03/01/22 2590)    Goals  Short term goals  Time Frame for Short term goals: By Discharge  Short term goal 1: Pt will complete UB ADLs with Supervision Assist with Fair+ Integration of EC / Ax Pacing. Short term goal 2: Pt will complete LB ADLs with SBA without LOB. Short term goal 3: Pt will complete Bathroom Transfers with SBA with Correct Use of AD / DME.   Short term goal 4: Pt will maintain Dynamic Standing Balance (5-7 minutes, without LOB) and during functional tasks. Short term goal 5: Pt will participation in Functional Mobility in-room / in-home with SBA with Fair+ integration of EC / Ax Pacing.        Therapy Time   Individual Concurrent Group Co-treatment   Time In 0844         Time Out 0927         Minutes 25       18 minutes Co-treat w/ PT   Timed Code Treatment Minutes: 2600 Indra MARTIN/NINA West/LAYNE

## 2022-03-01 NOTE — PROGRESS NOTES
Physical Therapy  Facility/Department: [facility] 1C STEP DOWN  Daily Treatment Note  NAME: Josue Gomez  : 1940  MRN: [id number]    Date of Service: 3/1/2022    Discharge Recommendations:  Patient would benefit from continued therapy after discharge   PT Equipment Recommendations  Equipment Needed: No    Assessment   Body structures, Functions, Activity limitations: Decreased strength;Decreased ROM; Decreased functional mobility ; Decreased cognition;Decreased safe awareness;Decreased vision/visual deficit; Decreased posture  Assessment: Pt peformed STS x5 with maxA x2 in SS and from bed. Able to clear buttocks but unable to achieve full upright posture. Pt sat EOB ~25min with Venita to CGA for balance and R lateral lean. Max cueing and encouragement required to maintain attention t/o. Pt would benefit from continued PT to address strenght/endurance deficits and improve functional independence. Prognosis: Good  PT Education: General Safety;PT Role;Plan of Care;Transfer Training;Functional Mobility Training  REQUIRES PT FOLLOW UP: Yes  Activity Tolerance  Activity Tolerance: Patient limited by fatigue;Patient limited by cognitive status     Patient Diagnosis(es): The primary encounter diagnosis was Subdural hematoma ([facility] [address] Diagnoses of S/P evacuation of subdural hematoma, Type 2 diabetes mellitus without complication, without long-term current use of insulin ([address] and Essential hypertension were also pertinent to this visit. has a past medical history of Gout, Hyperlipidemia, Hypertension, and Type II or unspecified type diabetes mellitus without mention of complication, not stated as uncontrolled. has a past surgical history that includes [facility] (Right, 2022) and craniotomy (N/A, 2022).     Restrictions  Restrictions/Precautions  Restrictions/Precautions: Surgical Protocols,Fall Risk,Up as Tolerated  Required Braces or Orthoses?: No  Position Activity Restriction  Other position/activity restrictions: Activity order per Neurosurgery note on 2/24/22:  \"Activity as Tolerated\". Skull incision present. Subjective   General  Chart Reviewed: Yes  Family / Caregiver Present: Yes (Cousin/ guardian present throughout session.)  Subjective  Subjective: RN and pt agreeable to PT this am. Pt supine in bed upon arrival with [facility] elevated, intermittently following commands with max encouragement. General Comment  Comments: Co-treat with OT. Pain Screening  Patient Currently in Pain: Denies  Pain Assessment  Pain Assessment: 0-10  Pain Level: 0  Vital Signs       Orientation  Orientation  Overall Orientation Status: Impaired  Orientation Level: Oriented to person;Disoriented to place; Disoriented to time;Disoriented to situation  Cognition   Cognition  Overall Cognitive Status: Exceptions  Arousal/Alertness: Delayed responses to stimuli  Following Commands: Inconsistently follows commands  Attention Span: Unable to maintain attention  Memory: Decreased recall of recent events  Safety Judgement: Decreased awareness of need for assistance;Decreased awareness of need for safety  Problem Solving: Decreased awareness of errors  Insights: Not aware of deficits  Initiation: Requires cues for all  Sequencing: Requires cues for all  Cognition Comment: Pt required Max Verbal and Tactile Cues for task initiation / sequencing. Objective   Bed mobility  Supine to Sit: Maximum assistance;2 Person assistance  Sit to Supine: Maximum assistance;2 Person assistance  Scooting: Maximal assistance  Comment: Pt supine in bed upon entry/exit this date with HOB elevated. Increased time and effort throughout all mobility. Required max cues for hand placement/ sequencing with fair return. Sat EOB for ~25min with CGA to Venita for R lateral lean.   Transfers  Sit to Stand: Maximum Assistance;2 Person Assistance  Stand to sit: Moderate Assistance;2 Person Assistance  Bed to Chair: Unable to assess  Comment: Pt attempted STS x2 from bed with SS for BUE pulling. Able to clear buttocks about ~4inches off of bed but unable to achieve full upright standing position. Attempted STS in SS x3 but unable to achieve full upright position despite max cues. Ambulation  Ambulation?: No  Stairs/Curb  Stairs?: No     Balance  Posture: Fair  Sitting - Static: Fair  Sitting - Dynamic: Poor;+  Standing - Static: Poor  Comments: Standing balance assessed while standing in SS, sitting balance while EOB. Exercises  Seated LE exercise program: Long Arc Quads, heel/toe raises, and marches. Reps: x5  Comments: All exercises performed while seated EOB. Pt required max cueing and encouragement throughout, had difficulty maintaining attention. PROM/AAROM for LLE and AROM for RLE. AM-PAC Score  -PAC Inpatient Mobility Raw Score : 9 (03/01/22 0929)  -PAC Inpatient T-Scale Score : 30.55 (03/01/22 0929)  Mobility Inpatient CMS 0-100% Score: 81.38 (03/01/22 0929)  Mobility Inpatient CMS G-Code Modifier : CM (03/01/22 0505)          Goals  Short term goals  Time Frame for Short term goals: 14 visits  Short term goal 1: Supine to/from sit with min A. Short term goal 2: Sit to/from stand with min A. Short term goal 3: Ambulate 4' along edge of bed with RW mod A. Short term goal 4: Improve LE strength to 3/5  to support function. Plan    Plan  Times per week: 5-6x/wk  Current Treatment Recommendations: Balance Training,Endurance Training,Safety Education & Training,Patient/Caregiver Education & Training,Functional Mobility Training,Transfer Training,Gait Training,Equipment Evaluation, Education, & procurement,Positioning,Strengthening  Safety Devices  Type of devices:  All fall risk precautions in place,Gait belt,Nurse notified,Left in bed,Call light within reach,Bed alarm in place  Restraints  Initially in place: No     Therapy Time   Individual Concurrent Group Co-treatment   Time In 0845         Time Out 0924         Minutes 39         Timed Code Treatment Minutes: 23 Minutes (Co-treat with OT.)       Sugey Spray    Treatment performed by Student PTA under the supervision of co-signing PTA who agrees with all treatment and documentation.    Sharita Schaefer, PTA

## 2022-03-01 NOTE — PROGRESS NOTES
[facility]  Internal Medicine Teaching Residency Program  Inpatient Daily Progress Note  ______________________________________________________________________________    Patient: Regine Luz  YOB: 1940   ZCH:[id number]    Acct: [de-identified]     Room: [address] [place] [address]  Admit date: 2/17/2022  Today's date: 03/01/22  Number of days in the hospital: 11    SUBJECTIVE   CC: Altered Mental Status    Pt examined at bedside. Chart & results reviewed. - VSS, pt is saturating well on RA. BP on the higher side. Patient working with PT/OT  - labs reviewed. - no acute events overnight.   - Patient denies headache, vision problems, nausea, vomiting, chest pain, cough, abdominal pain, changes in bowel or urinary habits, and swelling.       ROS:  General ROS: Completed and except as mentioned above were negative   HEENT ROS: Completed and except as mentioned above were negative   Allergy and Immunology ROS:  Completed and except as mentioned above were negative  Hematological and Lymphatic ROS:  Completed and except as mentioned above were negative  Respiratory ROS:  Completed and except as mentioned above were negative  Cardiovascular ROS:  Completed and except as mentioned above were negative  Gastrointestinal ROS: Completed and except as mentioned above were negative  Genito-Urinary ROS:  Completed and except as mentioned above were negative  Musculoskeletal ROS:  Completed and except as mentioned above were negative  Neurological ROS:  Completed and except as mentioned above were negative  Skin & Dermatological ROS:  Completed and except as mentioned above were negative  Psychological ROS:  Completed and except as mentioned above were negative  BRIEF HISTORY     Patient is a 81 y.o. female with past medical history significant for HTN, DM2, HLD, gout, CVA 08/21 who was initially admitted on 2/17/2022 with Subdural hematoma ([facility] [address] [S06.5X9A]  SDH (subdural hematoma) [facility]) [P28.6Q8O] EGN presented with AMS. Last known well one day before admission. H/o dementia - At baseline patient is able to recognize family members but not AO to time or place. Evangelista Kelloggas onto her bottom one month ago. Not compliant with aspirin     In the ER, imaging showed acute on chronic subdural hematoma 15 mm with 4 mm R to L shift.      In the ICU, patient underwent R joceline hole with subdural evacuation 22      Currently, patient is hypertensive - uncontrolled; increased hctz to 25 mg qd    OBJECTIVE     Vital Signs:  BP (!) 152/81   Pulse 88   Temp 98 °F (36.7 °C)   Resp 17   Ht 5' 5\" (1.651 m)   Wt 147 lb (66.7 kg)   SpO2 98%   BMI 24.46 kg/m²     Temp (24hrs), Av.2 °F (36.8 °C), Min:97.8 °F (36.6 °C), Max:98.8 °F (37.1 °C)    In: 125   Out: 350 [Urine:350]    Physical Exam:  Constitutional: This is a well developed, well nourished, 25-29.9 - Ayaz.North y.o. year old female who is not alert, oriented. She is in no apparent distress.    Head:normocephalic and atraumatic.    EENT:  PERRLA. No thrush was noted. Neck: Supple without thyromegaly. No elevated JVP. Respiratory: Chest was symmetrical without dullness to percussion.  Breath sounds bilaterally were clear to auscultation. There were no wheezes, rhonchi or rales. Cardiovascular: Regular without murmur, clicks, gallops or rubs. Abdomen: No tenderneess noted. No organomegaly  Lymphatic: No lymphadenopathy. Extremities:  No lower extremity edema, ulcerations, varicosities or erythema.  Muscle size, tone and strength are normal.  No involuntary movements are noted.    Skin:  Warm and dry.  Good color, turgor and pigmentation. No lesions or scars.  No cyanosis or clubbing  Neurological/Psychiatric: Baseline dementia, L > R sided weakness.      Medications:  Scheduled Medications:    amLODIPine  10 mg Oral Daily    enoxaparin  40 mg SubCUTAneous Daily    hydroCHLOROthiazide  25 mg Oral Daily    And    losartan  100 mg Oral Daily    sennosides-docusate sodium  2 tablet Oral Daily    atorvastatin  40 mg Oral Daily    sodium chloride flush  5-40 mL IntraVENous 2 times per day    metFORMIN  1,000 mg Oral BID WC    And    [Held by provider] alogliptin  12.5 mg Oral Daily     Continuous Infusions:    sodium chloride       PRN MedicationshydrALAZINE, 10 mg, Q6H PRN  labetalol, 10 mg, Q4H PRN  sodium chloride flush, 5-40 mL, PRN  sodium chloride, 25 mL, PRN  ondansetron, 4 mg, Q8H PRN   Or  ondansetron, 4 mg, Q6H PRN  polyethylene glycol, 17 g, Daily PRN  acetaminophen, 650 mg, Q6H PRN        Diagnostic Labs:  CBC:   Recent Labs     02/27/22  0613 02/28/22  0548 03/01/22  0503   WBC 7.1 7.0 6.6   RBC 3.49* 3.40* 3.64*   HGB 11.0* 10.7* 11.5*   HCT 32.9* 32.9* 34.0*   MCV 94.3 96.8 93.4   RDW 15.9* 15.9* 15.7*    333 375     BMP:   Recent Labs     02/27/22  0613 02/28/22  0548 03/01/22  0503    139 137   K 3.9 3.5* 3.7    103 99   CO2 25 23 24   BUN 8 6* 8   CREATININE 0.52 0.41* 0.47*     BNP: No results for input(s): BNP in the last 72 hours. PT/INR: No results for input(s): PROTIME, INR in the last 72 hours. APTT: No results for input(s): APTT in the last 72 hours. CARDIAC ENZYMES: No results for input(s): CKMB, CKMBINDEX, TROPONINI in the last 72 hours. Invalid input(s): CKTOTAL;3  FASTING LIPID PANEL:  Lab Results   Component Value Date    CHOL 137 10/19/2017    HDL 92 10/19/2017    TRIG 56 10/19/2017     LIVER PROFILE: No results for input(s): AST, ALT, ALB, BILIDIR, BILITOT, ALKPHOS in the last 72 hours. MICROBIOLOGY:   Lab Results   Component Value Date/Time    CULTURE KLEBSIELLA PNEUMONIAE 10 to 50,000 CFU/ML (A) 02/19/2022 08:21 AM       Imaging:    CT HEAD WO CONTRAST    Result Date: 2/24/2022  1. Stable volume acute on subacute right cerebral subdural hemorrhage with unchanged positioning of right-sided extra-axial surgical drain.  2. Unchanged distribution and appearance of right cerebral scattered

## 2022-03-02 VITALS
TEMPERATURE: 98 F | OXYGEN SATURATION: 94 % | BODY MASS INDEX: 24.49 KG/M2 | HEIGHT: 65 IN | SYSTOLIC BLOOD PRESSURE: 106 MMHG | RESPIRATION RATE: 16 BRPM | DIASTOLIC BLOOD PRESSURE: 54 MMHG | HEART RATE: 89 BPM | WEIGHT: 147 LBS

## 2022-03-02 LAB
ABSOLUTE EOS #: 0.28 K/UL (ref 0–0.44)
ABSOLUTE IMMATURE GRANULOCYTE: 0.03 K/UL (ref 0–0.3)
ABSOLUTE LYMPH #: 1.95 K/UL (ref 1.1–3.7)
ABSOLUTE MONO #: 0.78 K/UL (ref 0.1–1.2)
ANION GAP SERPL CALCULATED.3IONS-SCNC: 16 MMOL/L (ref 9–17)
BASOPHILS # BLD: 1 % (ref 0–2)
BASOPHILS ABSOLUTE: 0.05 K/UL (ref 0–0.2)
BUN BLDV-MCNC: 11 MG/DL (ref 8–23)
CALCIUM SERPL-MCNC: 9.1 MG/DL (ref 8.6–10.4)
CHLORIDE BLD-SCNC: 100 MMOL/L (ref 98–107)
CO2: 21 MMOL/L (ref 20–31)
CREAT SERPL-MCNC: 0.63 MG/DL (ref 0.5–0.9)
EOSINOPHILS RELATIVE PERCENT: 4 % (ref 1–4)
GFR AFRICAN AMERICAN: >60 ML/MIN
GFR NON-AFRICAN AMERICAN: >60 ML/MIN
GFR SERPL CREATININE-BSD FRML MDRD: NORMAL ML/MIN/{1.73_M2}
GLUCOSE BLD-MCNC: 77 MG/DL (ref 70–99)
HCT VFR BLD CALC: 34.7 % (ref 36.3–47.1)
HEMOGLOBIN: 11.6 G/DL (ref 11.9–15.1)
IMMATURE GRANULOCYTES: 0 %
LYMPHOCYTES # BLD: 27 % (ref 24–43)
MCH RBC QN AUTO: 31.3 PG (ref 25.2–33.5)
MCHC RBC AUTO-ENTMCNC: 33.4 G/DL (ref 28.4–34.8)
MCV RBC AUTO: 93.5 FL (ref 82.6–102.9)
MONOCYTES # BLD: 11 % (ref 3–12)
NRBC AUTOMATED: 0 PER 100 WBC
PDW BLD-RTO: 15.7 % (ref 11.8–14.4)
PLATELET # BLD: 370 K/UL (ref 138–453)
PMV BLD AUTO: 9.5 FL (ref 8.1–13.5)
POTASSIUM SERPL-SCNC: 3.9 MMOL/L (ref 3.7–5.3)
RBC # BLD: 3.71 M/UL (ref 3.95–5.11)
RBC # BLD: ABNORMAL 10*6/UL
SEG NEUTROPHILS: 57 % (ref 36–65)
SEGMENTED NEUTROPHILS ABSOLUTE COUNT: 4.16 K/UL (ref 1.5–8.1)
SODIUM BLD-SCNC: 137 MMOL/L (ref 135–144)
WBC # BLD: 7.3 K/UL (ref 3.5–11.3)

## 2022-03-02 PROCEDURE — 6370000000 HC RX 637 (ALT 250 FOR IP): Performed by: REGISTERED NURSE

## 2022-03-02 PROCEDURE — 36415 COLL VENOUS BLD VENIPUNCTURE: CPT

## 2022-03-02 PROCEDURE — 80048 BASIC METABOLIC PNL TOTAL CA: CPT

## 2022-03-02 PROCEDURE — 85025 COMPLETE CBC W/AUTO DIFF WBC: CPT

## 2022-03-02 PROCEDURE — 6370000000 HC RX 637 (ALT 250 FOR IP): Performed by: NURSE PRACTITIONER

## 2022-03-02 PROCEDURE — 2580000003 HC RX 258: Performed by: REGISTERED NURSE

## 2022-03-02 PROCEDURE — 99239 HOSP IP/OBS DSCHRG MGMT >30: CPT | Performed by: INTERNAL MEDICINE

## 2022-03-02 PROCEDURE — 6370000000 HC RX 637 (ALT 250 FOR IP): Performed by: STUDENT IN AN ORGANIZED HEALTH CARE EDUCATION/TRAINING PROGRAM

## 2022-03-02 PROCEDURE — 6360000002 HC RX W HCPCS

## 2022-03-02 RX ADMIN — DOCUSATE SODIUM 50 MG AND SENNOSIDES 8.6 MG 2 TABLET: 8.6; 5 TABLET, FILM COATED ORAL at 08:23

## 2022-03-02 RX ADMIN — ENOXAPARIN SODIUM 40 MG: 100 INJECTION SUBCUTANEOUS at 08:19

## 2022-03-02 RX ADMIN — LOSARTAN POTASSIUM 100 MG: 50 TABLET, FILM COATED ORAL at 08:25

## 2022-03-02 RX ADMIN — METFORMIN HYDROCHLORIDE 1000 MG: 500 TABLET ORAL at 08:23

## 2022-03-02 RX ADMIN — HYDROCHLOROTHIAZIDE 25 MG: 25 TABLET ORAL at 08:25

## 2022-03-02 RX ADMIN — SODIUM CHLORIDE, PRESERVATIVE FREE 10 ML: 5 INJECTION INTRAVENOUS at 08:24

## 2022-03-02 RX ADMIN — DESMOPRESSIN ACETATE 40 MG: 0.2 TABLET ORAL at 08:23

## 2022-03-02 ASSESSMENT — PAIN SCALES - GENERAL: PAINLEVEL_OUTOF10: 0

## 2022-03-02 NOTE — PROGRESS NOTES
[facility]  Internal Medicine Teaching Residency Program  Inpatient Daily Progress Note  ______________________________________________________________________________    Patient: Regine Luz  YOB: 1940   BNR:[id number]    Acct: [de-identified]     Room: 9807/7315-22  Admit date: 2/17/2022  Today's date: 03/02/22  Number of days in the hospital: 12    SUBJECTIVE   CC: Altered Mental Status    Pt examined at bedside. Chart & results reviewed. - VSS, pt is saturating well on RA. Leaving today at 8am.    - labs reviewed   - no acute events overnight.   - Patient denies headache, vision problems, nausea, vomiting, chest pain, cough, abdominal pain, changes in bowel or urinary habits, and swelling.       ROS:  General ROS: Completed and except as mentioned above were negative   HEENT ROS: Completed and except as mentioned above were negative   Allergy and Immunology ROS:  Completed and except as mentioned above were negative  Hematological and Lymphatic ROS:  Completed and except as mentioned above were negative  Respiratory ROS:  Completed and except as mentioned above were negative  Cardiovascular ROS:  Completed and except as mentioned above were negative  Gastrointestinal ROS: Completed and except as mentioned above were negative  Genito-Urinary ROS:  Completed and except as mentioned above were negative  Musculoskeletal ROS:  Completed and except as mentioned above were negative  Neurological ROS:  Completed and except as mentioned above were negative  Skin & Dermatological ROS:  Completed and except as mentioned above were negative  Psychological ROS:  Completed and except as mentioned above were negative  BRIEF HISTORY     Patient is a 81 y.o. female with past medical history significant for HTN, DM2, HLD, gout, CVA 08/21 who was initially admitted on 2/17/2022 with Subdural hematoma ([facility] [address] [F34.0Z1V]  SDH (subdural hematoma) ([facility] [address] [Y02.1Y4T] XTM presented with AMS. Last known well one day before admission. H/o dementia - At baseline patient is able to recognize family members but not AO to time or place. Janel Spring onto her bottom one month ago. Not compliant with aspirin     In the ER, imaging showed acute on chronic subdural hematoma 15 mm with 4 mm R to L shift.      In the ICU, patient underwent R joceline hole with subdural evacuation 22      Currently, patient is hypertensive - uncontrolled; increased hctz to 25 mg qd    OBJECTIVE     Vital Signs:  /65   Pulse 76   Temp 97.6 °F (36.4 °C) (Axillary)   Resp 17   Ht 5' 5\" (1.651 m)   Wt 147 lb (66.7 kg)   SpO2 94%   BMI 24.46 kg/m²     Temp (24hrs), Av.9 °F (36.6 °C), Min:96.4 °F (35.8 °C), Max:98.7 °F (37.1 °C)    In: 125   Out: -     Physical Exam:  Constitutional: This is a well developed, well nourished, 25-29.9 - Angelika.Jatin y.o. year old female who is not alert, oriented. She is in no apparent distress.  Baseline dementia  Head:normocephalic and atraumatic.    EENT:  PERRLA. No thrush was noted. Neck: Supple without thyromegaly. No elevated JVP. Respiratory: Chest was symmetrical without dullness to percussion.  Breath sounds bilaterally were clear to auscultation. There were no wheezes, rhonchi or rales. Cardiovascular: Regular without murmur, clicks, gallops or rubs. Abdomen: No tenderneess noted. No organomegaly  Lymphatic: No lymphadenopathy. Extremities:  No lower extremity edema, ulcerations, varicosities or erythema.  Muscle size, tone and strength are normal.  No involuntary movements are noted.    Skin:  Warm and dry.  Good color, turgor and pigmentation. Neurological/Psychiatric: Baseline dementia, L > R sided weakness.      Medications:  Scheduled Medications:    amLODIPine  10 mg Oral Daily    enoxaparin  40 mg SubCUTAneous Daily    hydroCHLOROthiazide  25 mg Oral Daily    And    losartan  100 mg Oral Daily    sennosides-docusate sodium  2 tablet Oral Daily    atorvastatin  40 mg Oral Daily    sodium chloride flush  5-40 mL IntraVENous 2 times per day    metFORMIN  1,000 mg Oral BID WC    And    [Held by provider] alogliptin  12.5 mg Oral Daily     Continuous Infusions:    sodium chloride       PRN MedicationshydrALAZINE, 10 mg, Q6H PRN  labetalol, 10 mg, Q4H PRN  sodium chloride flush, 5-40 mL, PRN  sodium chloride, 25 mL, PRN  ondansetron, 4 mg, Q8H PRN   Or  ondansetron, 4 mg, Q6H PRN  polyethylene glycol, 17 g, Daily PRN  acetaminophen, 650 mg, Q6H PRN        Diagnostic Labs:  CBC:   Recent Labs     02/28/22  0548 03/01/22  0503 03/02/22  0613   WBC 7.0 6.6 7.3   RBC 3.40* 3.64* 3.71*   HGB 10.7* 11.5* 11.6*   HCT 32.9* 34.0* 34.7*   MCV 96.8 93.4 93.5   RDW 15.9* 15.7* 15.7*    375 370     BMP:   Recent Labs     02/28/22  0548 03/01/22  0503 03/02/22  0613    137 137   K 3.5* 3.7 3.9    99 100   CO2 23 24 21   BUN 6* 8 11   CREATININE 0.41* 0.47* 0.63     BNP: No results for input(s): BNP in the last 72 hours. PT/INR: No results for input(s): PROTIME, INR in the last 72 hours. APTT: No results for input(s): APTT in the last 72 hours. CARDIAC ENZYMES: No results for input(s): CKMB, CKMBINDEX, TROPONINI in the last 72 hours. Invalid input(s): CKTOTAL;3  FASTING LIPID PANEL:  Lab Results   Component Value Date    CHOL 137 10/19/2017    HDL 92 10/19/2017    TRIG 56 10/19/2017     LIVER PROFILE: No results for input(s): AST, ALT, ALB, BILIDIR, BILITOT, ALKPHOS in the last 72 hours. MICROBIOLOGY:   Lab Results   Component Value Date/Time    CULTURE KLEBSIELLA PNEUMONIAE 10 to 50,000 CFU/ML (A) 02/19/2022 08:21 AM       Imaging:    CT HEAD WO CONTRAST    Result Date: 2/24/2022  1. Stable volume acute on subacute right cerebral subdural hemorrhage with unchanged positioning of right-sided extra-axial surgical drain. 2. Unchanged distribution and appearance of right cerebral scattered acute subarachnoid hemorrhage.  3. Stable leftward midline shift at the level of the septum pellucidum measuring 3 mm. 4. Right basal ganglia and thalamic multifocal remote lacunar infarcts. 5. Moderate cerebral white matter chronic microvascular ischemic disease. ASSESSMENT & PLAN     Principal Problem:    Subdural hematoma (HCC)  Active Problems:    Essential hypertension    Type 2 diabetes mellitus without complication, without long-term current use of insulin (HCC)    Mixed hyperlipidemia    S/P evacuation of subdural hematoma  Resolved Problems:    * No resolved hospital problems. *      Acute on chronic subdural hematoma s/p joceline hole with subdural evacuation 2/19/2022.    -Subdural drain removed on 2/24. Continues to be on Keppra 500 mg  Essential hypertension, uncontrolled. -Continue Cozaar, hydrochlorothiazide, Norvasc. Type II DM, controlled. -Continue Metformin. Monitor blood glucose closely.     DVT Ppx: Lovenox   GI Ppx: Not Indicated  Diet: regular diet  PT/OT: Consulted  Case Management: Consulted     Disposition: Needs SNF for acute rehab.  Going today @8am    Marah Guzman MD  PGY-1, Internal Medicine Resident  [facility]         3/2/2022, 7:27 AM

## 2022-03-02 NOTE — PROGRESS NOTES
Report called to nurse at [facility] taking patient, all questions answered.

## 2022-03-03 NOTE — DISCHARGE SUMMARY
[address]     Department of Internal Medicine - Staff Internal Medicine Teaching Service    INPATIENT DISCHARGE SUMMARY      Patient Identification:  Usama Rose is a 80 y.o. female. :  1940  MRN: [id number]     Acct: [de-identified]   PCP: Gene Huerta PA-C  Admit Date:  2022  Discharge date and time: 3/2/2022  9:10 AM   Attending Provider: No att. providers found                                     [address] Problem Lists:  Principal Problem:    Subdural hematoma ([address]  Active Problems:    Essential hypertension    Type 2 diabetes mellitus without complication, without long-term current use of insulin (HCC)    Mixed hyperlipidemia    S/P evacuation of subdural hematoma  Resolved Problems:    * No resolved hospital problems. *      HOSPITAL STAY     Brief Inpatient course: Usama Rose is a 80 y.o. female who presented with altered mentation. At baseline patient is able to recognize family members but not alert and oriented to time and place. In the ER imaging showed acute on chronic subdural hematoma with right-to-left shift. Patient underwent right joceline hole with subdural evacuation on 2022. Care was transferred to internal medicine teaching service for medical management. Patient started on Keppra, subdural drain was removed on 2022. Patient's hypertension was controlled with Cozaar, hydrochlorothiazide, Norvasc. Patient's Metformin was continued for her diabetes. We monitor blood glucose closely. Discharge order was placed on 22 and was eventually discharged on 3/2/2022 in a stable condition to SNF, due to pending placement.       PMH:  -Hypertension  -Diabetes mellitus type 2  -Hyperlipidemia  -Gout  -CVA   -Baseline dementia    Procedures/ Significant Interventions:    Mitchell rae with subdural evacuation    Consults:     Consults:     Final Specialist Recommendations/Findings:   IP CONSULT TO NEUROCRITICAL CARE  IP CONSULT TO NEUROSURGERY  IP CONSULT TO IV TEAM  IP CONSULT TO INTERNAL MEDICINE  IP CONSULT TO PHYSICAL MEDICINE REHAB      Any Hospital Acquired Infections: none    Discharge Functional Status:  stable    DISCHARGE PLAN     Disposition: SNF    Patient Instructions:   Discharge Medication List as of 3/1/2022  3:27 PM      START taking these medications    Details   sennosides-docusate sodium (SENOKOT-S) 8.6-50 MG tablet Take 2 tablets by mouth daily, Disp-30 tablet, R-0Normal      losartan-hydroCHLOROthiazide (HYZAAR) 100-25 MG per tablet Take 1 tablet by mouth daily, Disp-90 tablet, R-1Normal         CONTINUE these medications which have CHANGED    Details   amLODIPine (NORVASC) 10 MG tablet Take 1 tablet by mouth daily, Disp-30 tablet, R-3Normal      sitaGLIPtan-metFORMIN (JANUMET)  MG per tablet TAKE ONE TABLET BY MOUTH DAILY WITH SUPPER, Disp-60 tablet, R-2Normal         CONTINUE these medications which have NOT CHANGED    Details   atorvastatin (LIPITOR) 40 MG tablet TAKE ONE TABLET BY MOUTH DAILY, Disp-90 tablet, R-2Normal         STOP taking these medications       losartan-hydrochlorothiazide (HYZAAR) 50-12.5 MG per tablet Comments:   Reason for Stopping:         aspirin (ASPIR-LOW) 81 MG EC tablet Comments:   Reason for Stopping:               Activity: activity as tolerated    Diet: regular diet    Follow-up:    GRACIE Albarran - CNP  [address] [place] [address] [address] [facility] [address] [place] [id number]    Schedule an appointment as soon as possible for a visit in 2 weeks  Please follow up with neurosurgery 2 weeks after surgery for staple removal and CT head    [facility]  [phone]              Today        Patient Instructions:                                            Craniotomy Discharge Instructions      Thank you for choosing [facility] and [facility] for your surgical needs. The following instructions will help to ensure your comfort and that you are well prepared after your surgery.         Post-Operative Visit:   The office is located at:               [address],  [address]               [facility], [address] [facility], main floor               [address]               [phone]      Please also call your primary care physician to schedule an appointment for further evaluation and care. Diet:   · You may resume your regular diet   · Be sure to eat a well-balanced diet. Protein promotes wound healing. · Pain medication and decreased activity can cause constipation. Drink 8-10 glasses of water a day, eat fresh fruits and vegetables, and add prunes, raisins and bran cereals to your diet if you do become constipated. A stool softener taken 1-2 times a day is helpful. Dulcolax suppositories or Fleets enemas are also available without a prescription. Call our office if the problem continues.      Activity and Exercise:   · No lifting greater than 5 pounds (gallon of milk) for the first few weeks after surgery. · No driving until you are seen in the office. If you have had a seizure or have visual deficits, you may not drive until cleared by a neurologist.   · Avoid riding in a car for the first two weeks until you come to the office for your scheduled follow-up. · Start taking short, frequent walks in the beginning. [place], more frequent walks throughout the day are more beneficial than one long walk each day. You may gradually increase the distance; as tolerated. · If your pain increases, you may be walking too much or too far. Try backing off for a day or two and then resume slowly. · No baths, swimming or hot tub until you discuss this with your doctor.      Incision Care and Hygiene:   · Your incision is closed with staples or sutures and should be removed about 2 weeks after surgery.  If they are not removed please call the clinic to have them removed. · It is OK to shower 3-4 days after surgery. Let water run over the incision. Gently pat the incision dry with a clean towel, do not rub. Leave incision site open to air. · Do not apply ointments or lotions to the incision site.     Pain Management:   · Do not take NSAID medications (Ibuprofen, Naprosyn, etc.) or Buenrostro-2 inhibitors (Celebrex, etc.) for 2 weeks following surgery. · You will be given a prescription for pain medication. Our hope is that you will eventually be weaned off all pain medications. · Try not to take the pain medicine unless you need to. If you feel that you do not need something that strong, you may use regular or extra-strength Tylenol instead. · DO NOT drink alcohol, drive or operate heavy machinery while taking your pain medications. · Notify the office if your pain is not controlled or you need a medication refill before your appointment.      YOU SHOULD CALL THE OFFICE [phone] IF YOU HAVE ANY OF THE FOLLOWING:   · Severe or worsening headaches. · Ongoing nausea and/or vomiting. · If you notice any signs of infection such as bleeding, redness, swelling, tenderness, odor, drainage or opening of the incision. Please check your incisions twice daily. · Fevers greater than 101.5 degrees   · Clear fluid leaking from the incision. · Flu-like symptoms, chills, shakes, chest pain, shortness of breath, nausea, vomiting, diarrhea   · Changes in mental status such as confusion, slurred speech, increased sleepiness. · Any new neurologic sensory or motor deficits (weakness, numbness)   · Seizures   · Leg swelling or calf tenderness      *If you are unable to contact someone at the office and your symptoms persist or increase, call 911 or go to the emergency department.      -Aspirin held until follow-up with neurosurgery as scheduled.      Note that over 30 minutes was spent in preparing discharge papers, discussing discharge with patient, medication review, etc.      Kris Schneider MD  Internal Medicine Resident, PGY-1  [facility];  [place], [place]  3/3/2022, 1:24 PM

## 2022-03-14 ENCOUNTER — HOSPITAL ENCOUNTER (EMERGENCY)
Facility: CLINIC | Age: 82
Discharge: ANOTHER HOSPITAL | End: 2022-03-14
Attending: EMERGENCY MEDICINE
Payer: MEDICARE

## 2022-03-14 ENCOUNTER — OFFICE VISIT (OUTPATIENT)
Dept: NEUROSURGERY | Age: 82
End: 2022-03-14

## 2022-03-14 ENCOUNTER — APPOINTMENT (OUTPATIENT)
Dept: CT IMAGING | Facility: CLINIC | Age: 82
End: 2022-03-14
Payer: MEDICARE

## 2022-03-14 ENCOUNTER — HOSPITAL ENCOUNTER (INPATIENT)
Age: 82
LOS: 6 days | Discharge: NURSING, REHAB OR LONG-TERM CARE | DRG: 280 | End: 2022-03-20
Attending: FAMILY MEDICINE | Admitting: INTERNAL MEDICINE
Payer: MEDICARE

## 2022-03-14 ENCOUNTER — APPOINTMENT (OUTPATIENT)
Dept: GENERAL RADIOLOGY | Facility: CLINIC | Age: 82
End: 2022-03-14
Payer: MEDICARE

## 2022-03-14 VITALS
OXYGEN SATURATION: 90 % | DIASTOLIC BLOOD PRESSURE: 50 MMHG | SYSTOLIC BLOOD PRESSURE: 60 MMHG | BODY MASS INDEX: 24.46 KG/M2 | HEART RATE: 63 BPM | RESPIRATION RATE: 20 BRPM | HEIGHT: 65 IN

## 2022-03-14 VITALS
HEART RATE: 79 BPM | BODY MASS INDEX: 23.3 KG/M2 | OXYGEN SATURATION: 100 % | RESPIRATION RATE: 22 BRPM | SYSTOLIC BLOOD PRESSURE: 138 MMHG | DIASTOLIC BLOOD PRESSURE: 96 MMHG | WEIGHT: 140 LBS | TEMPERATURE: 97.4 F

## 2022-03-14 DIAGNOSIS — I95.9 HYPOTENSION, UNSPECIFIED HYPOTENSION TYPE: ICD-10-CM

## 2022-03-14 DIAGNOSIS — E86.0 DEHYDRATION: ICD-10-CM

## 2022-03-14 DIAGNOSIS — S06.5XAA SDH (SUBDURAL HEMATOMA): Primary | ICD-10-CM

## 2022-03-14 DIAGNOSIS — Z98.890 S/P CRANIOTOMY: ICD-10-CM

## 2022-03-14 DIAGNOSIS — N17.9 ACUTE KIDNEY INJURY (HCC): Primary | ICD-10-CM

## 2022-03-14 PROBLEM — I21.4 NSTEMI (NON-ST ELEVATED MYOCARDIAL INFARCTION) (HCC): Status: ACTIVE | Noted: 2022-03-14

## 2022-03-14 LAB
ABSOLUTE EOS #: 0 K/UL (ref 0–0.4)
ABSOLUTE LYMPH #: 1.3 K/UL (ref 1–4.8)
ABSOLUTE MONO #: 0.7 K/UL (ref 0.1–1.2)
ANION GAP SERPL CALCULATED.3IONS-SCNC: 19 MMOL/L (ref 9–17)
BASOPHILS # BLD: 1 % (ref 0–2)
BASOPHILS ABSOLUTE: 0 K/UL (ref 0–0.2)
BUN BLDV-MCNC: 48 MG/DL (ref 8–23)
CALCIUM SERPL-MCNC: 10.1 MG/DL (ref 8.6–10.4)
CHLORIDE BLD-SCNC: 109 MMOL/L (ref 98–107)
CO2: 21 MMOL/L (ref 20–31)
CREAT SERPL-MCNC: 1.5 MG/DL (ref 0.5–0.9)
EOSINOPHILS RELATIVE PERCENT: 1 % (ref 1–4)
GFR AFRICAN AMERICAN: 40 ML/MIN
GFR NON-AFRICAN AMERICAN: 33 ML/MIN
GFR SERPL CREATININE-BSD FRML MDRD: ABNORMAL ML/MIN/{1.73_M2}
GLUCOSE BLD-MCNC: 165 MG/DL (ref 70–99)
HCT VFR BLD CALC: 46 % (ref 36–46)
HEMOGLOBIN: 15.2 G/DL (ref 12–16)
LACTIC ACID, SEPSIS WHOLE BLOOD: 2.1 MMOL/L (ref 0.5–1.9)
LACTIC ACID, SEPSIS: 4.2 MMOL/L (ref 0.5–1.9)
LACTIC ACID, SEPSIS: 4.4 MMOL/L (ref 0.5–1.9)
LYMPHOCYTES # BLD: 13 % (ref 24–44)
MCH RBC QN AUTO: 31.8 PG (ref 26–34)
MCHC RBC AUTO-ENTMCNC: 33.1 G/DL (ref 31–37)
MCV RBC AUTO: 96.1 FL (ref 80–100)
MONOCYTES # BLD: 8 % (ref 2–11)
MYOGLOBIN: 85 NG/ML (ref 25–58)
PDW BLD-RTO: 16.4 % (ref 12.5–15.4)
PLATELET # BLD: 252 K/UL (ref 140–450)
PMV BLD AUTO: 8.9 FL (ref 6–12)
POTASSIUM SERPL-SCNC: 4.8 MMOL/L (ref 3.7–5.3)
PRO-BNP: 7864 PG/ML
PRO-BNP: ABNORMAL PG/ML
RBC # BLD: 4.78 M/UL (ref 4–5.2)
SEG NEUTROPHILS: 77 % (ref 36–66)
SEGMENTED NEUTROPHILS ABSOLUTE COUNT: 7.3 K/UL (ref 1.8–7.7)
SODIUM BLD-SCNC: 149 MMOL/L (ref 135–144)
TROPONIN, HIGH SENSITIVITY: 172 NG/L (ref 0–14)
TROPONIN, HIGH SENSITIVITY: 211 NG/L (ref 0–14)
TROPONIN, HIGH SENSITIVITY: 233 NG/L (ref 0–14)
WBC # BLD: 9.4 K/UL (ref 3.5–11)

## 2022-03-14 PROCEDURE — 85025 COMPLETE CBC W/AUTO DIFF WBC: CPT

## 2022-03-14 PROCEDURE — 84484 ASSAY OF TROPONIN QUANT: CPT

## 2022-03-14 PROCEDURE — 36415 COLL VENOUS BLD VENIPUNCTURE: CPT

## 2022-03-14 PROCEDURE — 2580000003 HC RX 258: Performed by: EMERGENCY MEDICINE

## 2022-03-14 PROCEDURE — 2500000003 HC RX 250 WO HCPCS: Performed by: NURSE PRACTITIONER

## 2022-03-14 PROCEDURE — 96361 HYDRATE IV INFUSION ADD-ON: CPT

## 2022-03-14 PROCEDURE — 70450 CT HEAD/BRAIN W/O DYE: CPT

## 2022-03-14 PROCEDURE — 6370000000 HC RX 637 (ALT 250 FOR IP): Performed by: NURSE PRACTITIONER

## 2022-03-14 PROCEDURE — 83605 ASSAY OF LACTIC ACID: CPT

## 2022-03-14 PROCEDURE — 2060000000 HC ICU INTERMEDIATE R&B

## 2022-03-14 PROCEDURE — 82947 ASSAY GLUCOSE BLOOD QUANT: CPT

## 2022-03-14 PROCEDURE — 80048 BASIC METABOLIC PNL TOTAL CA: CPT

## 2022-03-14 PROCEDURE — 71045 X-RAY EXAM CHEST 1 VIEW: CPT

## 2022-03-14 PROCEDURE — 2580000003 HC RX 258: Performed by: NURSE PRACTITIONER

## 2022-03-14 PROCEDURE — 83880 ASSAY OF NATRIURETIC PEPTIDE: CPT

## 2022-03-14 PROCEDURE — 96360 HYDRATION IV INFUSION INIT: CPT

## 2022-03-14 PROCEDURE — 99222 1ST HOSP IP/OBS MODERATE 55: CPT | Performed by: NURSE PRACTITIONER

## 2022-03-14 PROCEDURE — 99285 EMERGENCY DEPT VISIT HI MDM: CPT

## 2022-03-14 PROCEDURE — 83874 ASSAY OF MYOGLOBIN: CPT

## 2022-03-14 PROCEDURE — 99024 POSTOP FOLLOW-UP VISIT: CPT | Performed by: NURSE PRACTITIONER

## 2022-03-14 PROCEDURE — 93005 ELECTROCARDIOGRAM TRACING: CPT | Performed by: EMERGENCY MEDICINE

## 2022-03-14 RX ORDER — POTASSIUM CHLORIDE 20 MEQ/1
40 TABLET, EXTENDED RELEASE ORAL PRN
Status: DISCONTINUED | OUTPATIENT
Start: 2022-03-14 | End: 2022-03-14

## 2022-03-14 RX ORDER — SENNA AND DOCUSATE SODIUM 50; 8.6 MG/1; MG/1
2 TABLET, FILM COATED ORAL DAILY
Status: DISCONTINUED | OUTPATIENT
Start: 2022-03-15 | End: 2022-03-20

## 2022-03-14 RX ORDER — POTASSIUM CHLORIDE 7.45 MG/ML
10 INJECTION INTRAVENOUS PRN
Status: DISCONTINUED | OUTPATIENT
Start: 2022-03-14 | End: 2022-03-14

## 2022-03-14 RX ORDER — ATORVASTATIN CALCIUM 40 MG/1
40 TABLET, FILM COATED ORAL DAILY
Status: DISCONTINUED | OUTPATIENT
Start: 2022-03-15 | End: 2022-03-20 | Stop reason: HOSPADM

## 2022-03-14 RX ORDER — ONDANSETRON 2 MG/ML
4 INJECTION INTRAMUSCULAR; INTRAVENOUS EVERY 6 HOURS PRN
Status: DISCONTINUED | OUTPATIENT
Start: 2022-03-14 | End: 2022-03-20 | Stop reason: HOSPADM

## 2022-03-14 RX ORDER — ACETAMINOPHEN 500 MG
1000 TABLET ORAL 2 TIMES DAILY
Status: DISCONTINUED | OUTPATIENT
Start: 2022-03-14 | End: 2022-03-15

## 2022-03-14 RX ORDER — MAGNESIUM SULFATE 1 G/100ML
1000 INJECTION INTRAVENOUS PRN
Status: DISCONTINUED | OUTPATIENT
Start: 2022-03-14 | End: 2022-03-14

## 2022-03-14 RX ORDER — 0.9 % SODIUM CHLORIDE 0.9 %
500 INTRAVENOUS SOLUTION INTRAVENOUS ONCE
Status: COMPLETED | OUTPATIENT
Start: 2022-03-14 | End: 2022-03-14

## 2022-03-14 RX ORDER — AMLODIPINE BESYLATE 10 MG/1
10 TABLET ORAL DAILY
Status: DISCONTINUED | OUTPATIENT
Start: 2022-03-15 | End: 2022-03-16

## 2022-03-14 RX ORDER — SODIUM CHLORIDE 9 MG/ML
25 INJECTION, SOLUTION INTRAVENOUS PRN
Status: DISCONTINUED | OUTPATIENT
Start: 2022-03-14 | End: 2022-03-20 | Stop reason: HOSPADM

## 2022-03-14 RX ORDER — DEXTROSE MONOHYDRATE 50 MG/ML
100 INJECTION, SOLUTION INTRAVENOUS PRN
Status: DISCONTINUED | OUTPATIENT
Start: 2022-03-14 | End: 2022-03-20 | Stop reason: HOSPADM

## 2022-03-14 RX ORDER — ACETAMINOPHEN 500 MG
1000 TABLET ORAL 2 TIMES DAILY
COMMUNITY

## 2022-03-14 RX ORDER — ONDANSETRON 4 MG/1
4 TABLET, ORALLY DISINTEGRATING ORAL EVERY 8 HOURS PRN
Status: DISCONTINUED | OUTPATIENT
Start: 2022-03-14 | End: 2022-03-20 | Stop reason: HOSPADM

## 2022-03-14 RX ORDER — SODIUM CHLORIDE 0.9 % (FLUSH) 0.9 %
5-40 SYRINGE (ML) INJECTION EVERY 12 HOURS SCHEDULED
Status: DISCONTINUED | OUTPATIENT
Start: 2022-03-14 | End: 2022-03-20 | Stop reason: HOSPADM

## 2022-03-14 RX ORDER — NICOTINE POLACRILEX 4 MG
15 LOZENGE BUCCAL PRN
Status: DISCONTINUED | OUTPATIENT
Start: 2022-03-14 | End: 2022-03-20 | Stop reason: HOSPADM

## 2022-03-14 RX ORDER — SODIUM CHLORIDE 0.9 % (FLUSH) 0.9 %
10 SYRINGE (ML) INJECTION PRN
Status: DISCONTINUED | OUTPATIENT
Start: 2022-03-14 | End: 2022-03-20 | Stop reason: HOSPADM

## 2022-03-14 RX ORDER — 0.9 % SODIUM CHLORIDE 0.9 %
250 INTRAVENOUS SOLUTION INTRAVENOUS ONCE
Status: COMPLETED | OUTPATIENT
Start: 2022-03-14 | End: 2022-03-14

## 2022-03-14 RX ORDER — DEXTROSE MONOHYDRATE 25 G/50ML
12.5 INJECTION, SOLUTION INTRAVENOUS PRN
Status: DISCONTINUED | OUTPATIENT
Start: 2022-03-14 | End: 2022-03-20 | Stop reason: HOSPADM

## 2022-03-14 RX ADMIN — SODIUM CHLORIDE, PRESERVATIVE FREE 10 ML: 5 INJECTION INTRAVENOUS at 23:37

## 2022-03-14 RX ADMIN — SODIUM CHLORIDE 250 ML: 9 INJECTION, SOLUTION INTRAVENOUS at 12:59

## 2022-03-14 RX ADMIN — ACETAMINOPHEN 1000 MG: 500 TABLET ORAL at 23:15

## 2022-03-14 RX ADMIN — Medication 12.5 G: at 23:12

## 2022-03-14 RX ADMIN — SODIUM CHLORIDE 500 ML: 9 INJECTION, SOLUTION INTRAVENOUS at 14:42

## 2022-03-14 ASSESSMENT — PAIN SCALES - GENERAL
PAINLEVEL_OUTOF10: 2
PAINLEVEL_OUTOF10: 0

## 2022-03-14 ASSESSMENT — PAIN SCALES - WONG BAKER: WONGBAKER_NUMERICALRESPONSE: 0

## 2022-03-14 NOTE — ED NOTES
Multiple attempts to obtain bloodwork were unsuccessful. [place], RN calling lab.       Ryann Hdez RN  03/14/22 8044

## 2022-03-14 NOTE — ED PROVIDER NOTES
[facility] ED  [address] [facility]  Phone: [address]      Pt Name: George Castillo  MRN: [id number]  [place] 1940  Date of evaluation: 3/14/2022    CHIEF COMPLAINT       Chief Complaint   Patient presents with    Hypotension       HISTORY OF PRESENT ILLNESS    George Castillo is a 80 y.o. female who presents to the emergency room by ambulance from the nursing home for hypotension. Patient denies pain but poor historian. Blood pressure 75 systolic at the nursing home on arrival 90. No other history able to be obtained before family arrived. REVIEW OF SYSTEMS     Unable to assess secondary to patient's condition  PAST MEDICAL HISTORY    has a past medical history of Dementia ([facility] [address] [facility] Gout, Hyperlipidemia, Hypertension, and Type II or unspecified type diabetes mellitus without mention of complication, not stated as uncontrolled. SURGICAL HISTORY      has a past surgical history that includes [facility] (Right, 2022) and craniotomy (N/A, 2022). CURRENT MEDICATIONS       Previous Medications    ACETAMINOPHEN (TYLENOL) 500 MG TABLET    Take 1,000 mg by mouth 2 times daily    AMLODIPINE (NORVASC) 10 MG TABLET    Take 1 tablet by mouth daily    ATORVASTATIN (LIPITOR) 40 MG TABLET    TAKE ONE TABLET BY MOUTH DAILY    LOSARTAN-HYDROCHLOROTHIAZIDE (HYZAAR) 100-25 MG PER TABLET    Take 1 tablet by mouth daily    SENNOSIDES-DOCUSATE SODIUM (SENOKOT-S) 8.6-50 MG TABLET    Take 2 tablets by mouth daily    SITAGLIPTAN-METFORMIN (JANUMET)  MG PER TABLET    TAKE ONE TABLET BY MOUTH DAILY WITH SUPPER       ALLERGIES     has No Known Allergies. FAMILY HISTORY     She indicated that her mother is . family history includes High Blood Pressure in her mother. SOCIAL HISTORY      reports that she quit smoking about 41 years ago. Her smoking use included cigarettes. She quit after 20.00 years of use.  She has never used smokeless tobacco.    PHYSICAL EXAM       ED Triage Vitals   BP 97/76   Pulse 86   Temp 97.4 °F (36.3 °C) (Axillary)   Resp 24   SpO2 97%   Constitutional: Alert, in no acute distress  HEENT: Extraocular muscles intact, mucus membranes dry questionable thrush  Neck: Trachea midline   Cardiovascular: Regular rhythm and rate no murmurs   Respiratory: Diminished to auscultation bilaterally no wheezes, rhonchi, rales, no respiratory distress no tachypnea no retractions no hypoxia  Gastrointestinal: Soft, nontender, nondistended, diminished bowel sounds. No rebound, rigidity, or guarding. No known renal pulsatile mass  Musculoskeletal: No extremity pain or swelling   Neurologic: Unable to fully assess secondary to patient's condition  Skin: Warm and dry       DIFFERENTIAL DIAGNOSIS/ MDM:     IV fluids labs. EKG. Chest imaging. DIAGNOSTIC RESULTS     EKG: All EKG's are interpreted by the Emergency Department Physician who either signs or Co-signs this chart in the absence of a cardiologist.    1150 sinus rhythm rate 87  QRS 84  no acute ST or T wave changes.     Not indicated unless otherwise documented above    LABS:  Results for orders placed or performed during the hospital encounter of 03/14/22   CBC with Auto Differential   Result Value Ref Range    WBC 9.4 3.5 - 11.0 k/uL    RBC 4.78 4.0 - 5.2 m/uL    Hemoglobin 15.2 12.0 - 16.0 g/dL    Hematocrit 46.0 36 - 46 %    MCV 96.1 80 - 100 fL    MCH 31.8 26 - 34 pg    MCHC 33.1 31 - 37 g/dL    RDW 16.4 (H) 12.5 - 15.4 %    Platelets 264 836 - 419 k/uL    MPV 8.9 6.0 - 12.0 fL    Seg Neutrophils 77 (H) 36 - 66 %    Lymphocytes 13 (L) 24 - 44 %    Monocytes 8 2 - 11 %    Eosinophils % 1 1 - 4 %    Basophils 1 0 - 2 %    Segs Absolute 7.30 1.8 - 7.7 k/uL    Absolute Lymph # 1.30 1.0 - 4.8 k/uL    Absolute Mono # 0.70 0.1 - 1.2 k/uL    Absolute Eos # 0.00 0.0 - 0.4 k/uL    Basophils Absolute 0.00 0.0 - 0.2 k/uL   Basic Metabolic Panel   Result Value Ref Range Glucose 165 (H) 70 - 99 mg/dL    BUN 48 (H) 8 - 23 mg/dL    CREATININE 1.50 (H) 0.50 - 0.90 mg/dL    Calcium 10.1 8.6 - 10.4 mg/dL    Sodium 149 (H) 135 - 144 mmol/L    Potassium 4.8 3.7 - 5.3 mmol/L    Chloride 109 (H) 98 - 107 mmol/L    CO2 21 20 - 31 mmol/L    Anion Gap 19 (H) 9 - 17 mmol/L    GFR Non-African American 33 (L) >60 mL/min    GFR African American 40 (L) >60 mL/min    GFR Comment         Troponin   Result Value Ref Range    Troponin, High Sensitivity 233 (HH) 0 - 14 ng/L   Lactate, Sepsis   Result Value Ref Range    Lactic Acid, Sepsis 4.4 (H) 0.5 - 1.9 mmol/L   Lactate, Sepsis   Result Value Ref Range    Lactic Acid, Sepsis 4.2 (H) 0.5 - 1.9 mmol/L   Brain Natriuretic Peptide   Result Value Ref Range    Pro-BNP 11,047 (H) <300 pg/mL   Troponin   Result Value Ref Range    Troponin, High Sensitivity 211 (HH) 0 - 14 ng/L       Not indicated unless otherwise documented above    RADIOLOGY:   I reviewed the radiologist interpretations:    CT HEAD WO CONTRAST   Final Result      From the previously noted areas of subarachnoid hemorrhage only minimal   subarachnoid hemorrhage is left within the right frontal convexity sulci. The   other areas have resolved. Changes related to right frontal craniotomy with interval removal of right   subdural drain with 12 mm chronic subdural collection within the right   frontoparietal region with 3-4 mm leftward midline shift. . Subdural   collection is unchanged in size however the acute blood products have   resolved. No evidence for acute intraparenchymal hemorrhage, territorial infarction or   intracranial mass lesion. Moderate chronic microangiopathic ischemic disease. Unchanged foci of   chronic lacunar infarction      Mild generalized volume loss. RECOMMENDATIONS:   Unavailable         XR CHEST PORTABLE   Final Result   No acute cardiopulmonary process.              Not indicated unless otherwise documented above    EMERGENCY DEPARTMENT COURSE:

## 2022-03-14 NOTE — ED NOTES
Pt's SpO2 dropped to 80% on room air. Pt placed on 2L of oxygen via nasal cannula and SpO2 now at 97%. Dr. Wendi Klein notified.      Freddie Denise RN  03/14/22 7908

## 2022-03-14 NOTE — ED NOTES
Called [facility], spoke with Johanny Boucher, request page out for hospitalist at [facility]. V for admit.      Ana Perales RN  03/14/22 1790

## 2022-03-14 NOTE — ED NOTES
Pt resting comfortably on cot. Chest rise and fall noted. RR even and non labored. NAD noted at this time. Call light within reach.       Brandie Nagy RN  03/14/22 9667

## 2022-03-14 NOTE — ED NOTES
Writer also called [facility] and [facility] transport services who all report that they do not have any ALS crews tonight.       Lani Lowery RN  03/14/22 0775

## 2022-03-14 NOTE — ED NOTES
Pt's cousin back at bedside. Dr. Nela Shields at bedside updating pt and family.       Preet Albrecht RN  03/14/22 7145

## 2022-03-14 NOTE — ED NOTES
[facility] Access reports pt has a bed at [facility]: DANIELLE Interiano number for report is [phone].      Cayetano Phelan RN  03/14/22 9562

## 2022-03-14 NOTE — ED NOTES
Pt to ED via EMS from [facility] with c/o hypotension. Pt's nurse at [facility] center reports pt's BP was 75/48 after 10mg of midodrine. Pt alert and only oriented to self. Pt's cousin and Carol TADEO Gume at bedside and reports pt was supposed to be getting discharged today from the [facility]. Pt placed on full cardiac monitor. RR even and non labored. Pt denies any pain at this time. EKG completed. IV placed. Call light within reach.       Preet Albrecht, ALVIN  03/14/22 0818

## 2022-03-14 NOTE — PROGRESS NOTES
[address]  [facility] [address] [address]  Dept: [phone]    Patient:  Charli Arzola  YOB: 1940  Date: 3/14/22    The patient is a 80 y.o. female who presents today for consult of the following problems:     Chief Complaint   Patient presents with    Post-Op Check         HPI:     Charli Arzola is a 80 y.o. female who presents to the office for post-op evaluation s/p joceline hole craniotomy for evacuation of right sided acute on chronic subdural hematoma. Patient currently in rehab facility, working with PT/OT. Still with left-sided weakness, follows some simple commands on the right side. Minimally verbal, will answer yes intermittently. Due for repeat CT, this has not yet been complete. Incisions healed well-staples remain in place  Following some simple commands with right arm and leg, slight movement of left arm and leg  Minimal verbal response  Opening eyes    Date of surgery: 2/19/2022    Assessment and Plan:      1. SDH (subdural hematoma) (HCC)    2. S/P craniotomy          Plan: Intact staples removed without difficulty, patient tolerated well. Still with left-sided weakness. Due for repeat CT, orders again provided for facility to schedule and complete. To new working with PT/OT. We will see the patient back in approximately 4 weeks for follow-up after completion of repeat CT. Followup: Return in about 4 weeks (around 4/11/2022), or if symptoms worsen or fail to improve. Prescriptions Ordered:  No orders of the defined types were placed in this encounter.        Orders Placed:  Orders Placed This Encounter   Procedures    CT HEAD WO CONTRAST     Standing Status:   Future     Standing Expiration Date:   3/14/2023     Order Specific Question:   Reason for exam:     Answer:   post-op joceline hole crani for evacuation of SDH Electronically signed by GRACIE Archer CNP on 3/14/2022 at 10:28 AM    Please note that this chart was generated using voice recognition Dragon dictation software. Although every effort was made to ensure the accuracy of this automated transcription, some errors in transcription may have occurred.

## 2022-03-14 NOTE — ED NOTES
No needs voiced at this time. Pt RR even and non labored. NAD noted at this time. VSS. Call light within reach.       Clarence Wiggins RN  03/14/22 0084

## 2022-03-15 ENCOUNTER — APPOINTMENT (OUTPATIENT)
Dept: GENERAL RADIOLOGY | Age: 82
DRG: 280 | End: 2022-03-15
Attending: FAMILY MEDICINE
Payer: MEDICARE

## 2022-03-15 PROBLEM — R13.10 DYSPHAGIA: Status: ACTIVE | Noted: 2022-03-15

## 2022-03-15 PROBLEM — E86.0 ACUTE DEHYDRATION: Status: ACTIVE | Noted: 2022-03-15

## 2022-03-15 PROBLEM — I95.89 HYPOTENSION DUE TO HYPOVOLEMIA: Status: ACTIVE | Noted: 2022-03-15

## 2022-03-15 PROBLEM — E86.1 HYPOTENSION DUE TO HYPOVOLEMIA: Status: ACTIVE | Noted: 2022-03-15

## 2022-03-15 PROBLEM — E87.0 HYPERNATREMIA: Status: ACTIVE | Noted: 2022-03-15

## 2022-03-15 PROBLEM — N17.9 ACUTE KIDNEY INJURY (HCC): Status: ACTIVE | Noted: 2022-03-15

## 2022-03-15 LAB
-: ABNORMAL
ANION GAP SERPL CALCULATED.3IONS-SCNC: 14 MMOL/L (ref 9–17)
ANION GAP SERPL CALCULATED.3IONS-SCNC: 17 MMOL/L (ref 9–17)
BACTERIA: ABNORMAL
BETA-HYDROXYBUTYRATE: 0.22 MMOL/L (ref 0.02–0.27)
BILIRUBIN URINE: NEGATIVE
BUN BLDV-MCNC: 49 MG/DL (ref 8–23)
BUN BLDV-MCNC: 49 MG/DL (ref 8–23)
CALCIUM SERPL-MCNC: 9.1 MG/DL (ref 8.6–10.4)
CALCIUM SERPL-MCNC: 9.1 MG/DL (ref 8.6–10.4)
CHLORIDE BLD-SCNC: 113 MMOL/L (ref 98–107)
CHLORIDE BLD-SCNC: 115 MMOL/L (ref 98–107)
CO2: 20 MMOL/L (ref 20–31)
CO2: 24 MMOL/L (ref 20–31)
COLOR: YELLOW
CREAT SERPL-MCNC: 1.19 MG/DL (ref 0.5–0.9)
CREAT SERPL-MCNC: 1.22 MG/DL (ref 0.5–0.9)
EKG ATRIAL RATE: 87 BPM
EKG P AXIS: 61 DEGREES
EKG P-R INTERVAL: 148 MS
EKG Q-T INTERVAL: 420 MS
EKG QRS DURATION: 84 MS
EKG QTC CALCULATION (BAZETT): 505 MS
EKG R AXIS: -49 DEGREES
EKG T AXIS: -6 DEGREES
EKG VENTRICULAR RATE: 87 BPM
EPITHELIAL CELLS UA: ABNORMAL /HPF (ref 0–5)
GFR AFRICAN AMERICAN: 51 ML/MIN
GFR AFRICAN AMERICAN: 53 ML/MIN
GFR NON-AFRICAN AMERICAN: 42 ML/MIN
GFR NON-AFRICAN AMERICAN: 44 ML/MIN
GFR SERPL CREATININE-BSD FRML MDRD: ABNORMAL ML/MIN/{1.73_M2}
GFR SERPL CREATININE-BSD FRML MDRD: ABNORMAL ML/MIN/{1.73_M2}
GLUCOSE BLD-MCNC: 109 MG/DL (ref 70–99)
GLUCOSE BLD-MCNC: 110 MG/DL (ref 65–105)
GLUCOSE BLD-MCNC: 135 MG/DL (ref 65–105)
GLUCOSE BLD-MCNC: 69 MG/DL (ref 65–105)
GLUCOSE BLD-MCNC: 87 MG/DL (ref 65–105)
GLUCOSE BLD-MCNC: 88 MG/DL (ref 65–105)
GLUCOSE BLD-MCNC: 98 MG/DL (ref 65–105)
GLUCOSE BLD-MCNC: 99 MG/DL (ref 70–99)
GLUCOSE URINE: NEGATIVE
HCT VFR BLD CALC: 39.2 % (ref 36.3–47.1)
HEMOGLOBIN: 12.3 G/DL (ref 11.9–15.1)
INR BLD: 1.2
KETONES, URINE: ABNORMAL
LACTIC ACID, SEPSIS WHOLE BLOOD: 1.8 MMOL/L (ref 0.5–1.9)
LACTIC ACID, WHOLE BLOOD: 1.7 MMOL/L (ref 0.7–2.1)
LEUKOCYTE ESTERASE, URINE: ABNORMAL
MAGNESIUM: 2 MG/DL (ref 1.6–2.6)
MCH RBC QN AUTO: 30.8 PG (ref 25.2–33.5)
MCHC RBC AUTO-ENTMCNC: 31.4 G/DL (ref 28.4–34.8)
MCV RBC AUTO: 98.2 FL (ref 82.6–102.9)
MYOGLOBIN: 60 NG/ML (ref 25–58)
MYOGLOBIN: 77 NG/ML (ref 25–58)
NITRITE, URINE: POSITIVE
NRBC AUTOMATED: 0 PER 100 WBC
PARTIAL THROMBOPLASTIN TIME: 117 SEC (ref 20.5–30.5)
PARTIAL THROMBOPLASTIN TIME: 47.8 SEC (ref 20.5–30.5)
PDW BLD-RTO: 16.7 % (ref 11.8–14.4)
PH UA: 5 (ref 5–8)
PLATELET # BLD: 231 K/UL (ref 138–453)
PMV BLD AUTO: 10.7 FL (ref 8.1–13.5)
POTASSIUM SERPL-SCNC: 4 MMOL/L (ref 3.7–5.3)
POTASSIUM SERPL-SCNC: 4 MMOL/L (ref 3.7–5.3)
PROTEIN UA: NEGATIVE
PROTHROMBIN TIME: 12.7 SEC (ref 9.1–12.3)
RBC # BLD: 3.99 M/UL (ref 3.95–5.11)
RBC UA: ABNORMAL /HPF (ref 0–4)
SARS-COV-2, RAPID: NOT DETECTED
SODIUM BLD-SCNC: 150 MMOL/L (ref 135–144)
SODIUM BLD-SCNC: 153 MMOL/L (ref 135–144)
SPECIFIC GRAVITY UA: 1.02 (ref 1–1.03)
SPECIMEN DESCRIPTION: NORMAL
TROPONIN, HIGH SENSITIVITY: 159 NG/L (ref 0–14)
TROPONIN, HIGH SENSITIVITY: 180 NG/L (ref 0–14)
TURBIDITY: ABNORMAL
URINE HGB: ABNORMAL
UROBILINOGEN, URINE: NORMAL
WBC # BLD: 9.6 K/UL (ref 3.5–11.3)
WBC UA: ABNORMAL /HPF (ref 0–5)

## 2022-03-15 PROCEDURE — 81001 URINALYSIS AUTO W/SCOPE: CPT

## 2022-03-15 PROCEDURE — 99232 SBSQ HOSP IP/OBS MODERATE 35: CPT | Performed by: INTERNAL MEDICINE

## 2022-03-15 PROCEDURE — 82010 KETONE BODYS QUAN: CPT

## 2022-03-15 PROCEDURE — 85730 THROMBOPLASTIN TIME PARTIAL: CPT

## 2022-03-15 PROCEDURE — 2580000003 HC RX 258: Performed by: NURSE PRACTITIONER

## 2022-03-15 PROCEDURE — 97166 OT EVAL MOD COMPLEX 45 MIN: CPT

## 2022-03-15 PROCEDURE — 83874 ASSAY OF MYOGLOBIN: CPT

## 2022-03-15 PROCEDURE — 2580000003 HC RX 258: Performed by: INTERNAL MEDICINE

## 2022-03-15 PROCEDURE — 97162 PT EVAL MOD COMPLEX 30 MIN: CPT

## 2022-03-15 PROCEDURE — 36415 COLL VENOUS BLD VENIPUNCTURE: CPT

## 2022-03-15 PROCEDURE — 87077 CULTURE AEROBIC IDENTIFY: CPT

## 2022-03-15 PROCEDURE — 85027 COMPLETE CBC AUTOMATED: CPT

## 2022-03-15 PROCEDURE — 87086 URINE CULTURE/COLONY COUNT: CPT

## 2022-03-15 PROCEDURE — 87635 SARS-COV-2 COVID-19 AMP PRB: CPT

## 2022-03-15 PROCEDURE — 83605 ASSAY OF LACTIC ACID: CPT

## 2022-03-15 PROCEDURE — 74230 X-RAY XM SWLNG FUNCJ C+: CPT

## 2022-03-15 PROCEDURE — 80048 BASIC METABOLIC PNL TOTAL CA: CPT

## 2022-03-15 PROCEDURE — 87186 SC STD MICRODIL/AGAR DIL: CPT

## 2022-03-15 PROCEDURE — 92611 MOTION FLUOROSCOPY/SWALLOW: CPT

## 2022-03-15 PROCEDURE — 2060000000 HC ICU INTERMEDIATE R&B

## 2022-03-15 PROCEDURE — 83735 ASSAY OF MAGNESIUM: CPT

## 2022-03-15 PROCEDURE — 97535 SELF CARE MNGMENT TRAINING: CPT

## 2022-03-15 PROCEDURE — 85610 PROTHROMBIN TIME: CPT

## 2022-03-15 PROCEDURE — 84484 ASSAY OF TROPONIN QUANT: CPT

## 2022-03-15 PROCEDURE — 6360000002 HC RX W HCPCS: Performed by: INTERNAL MEDICINE

## 2022-03-15 PROCEDURE — 6360000002 HC RX W HCPCS: Performed by: NURSE PRACTITIONER

## 2022-03-15 PROCEDURE — 6370000000 HC RX 637 (ALT 250 FOR IP): Performed by: NURSE PRACTITIONER

## 2022-03-15 PROCEDURE — 94761 N-INVAS EAR/PLS OXIMETRY MLT: CPT

## 2022-03-15 PROCEDURE — 97530 THERAPEUTIC ACTIVITIES: CPT

## 2022-03-15 PROCEDURE — 2700000000 HC OXYGEN THERAPY PER DAY

## 2022-03-15 PROCEDURE — 6370000000 HC RX 637 (ALT 250 FOR IP): Performed by: INTERNAL MEDICINE

## 2022-03-15 RX ORDER — DEXTROSE MONOHYDRATE 50 MG/ML
INJECTION, SOLUTION INTRAVENOUS CONTINUOUS
Status: ACTIVE | OUTPATIENT
Start: 2022-03-15 | End: 2022-03-16

## 2022-03-15 RX ORDER — ACETAMINOPHEN 500 MG
1000 TABLET ORAL 2 TIMES DAILY PRN
Status: DISCONTINUED | OUTPATIENT
Start: 2022-03-15 | End: 2022-03-20 | Stop reason: HOSPADM

## 2022-03-15 RX ORDER — ASPIRIN 81 MG/1
81 TABLET, CHEWABLE ORAL DAILY
Status: DISCONTINUED | OUTPATIENT
Start: 2022-03-15 | End: 2022-03-20 | Stop reason: HOSPADM

## 2022-03-15 RX ADMIN — DESMOPRESSIN ACETATE 40 MG: 0.2 TABLET ORAL at 08:21

## 2022-03-15 RX ADMIN — ACETAMINOPHEN 1000 MG: 500 TABLET ORAL at 08:21

## 2022-03-15 RX ADMIN — SODIUM CHLORIDE 500 ML: 4.5 INJECTION, SOLUTION INTRAVENOUS at 04:12

## 2022-03-15 RX ADMIN — STANDARDIZED SENNA CONCENTRATE AND DOCUSATE SODIUM 2 TABLET: 8.6; 5 TABLET ORAL at 08:20

## 2022-03-15 RX ADMIN — ASPIRIN 81 MG: 81 TABLET, CHEWABLE ORAL at 12:39

## 2022-03-15 RX ADMIN — DEXTROSE MONOHYDRATE: 50 INJECTION, SOLUTION INTRAVENOUS at 13:39

## 2022-03-15 RX ADMIN — CEFTRIAXONE SODIUM 1000 MG: 1 INJECTION, POWDER, FOR SOLUTION INTRAMUSCULAR; INTRAVENOUS at 13:37

## 2022-03-15 RX ADMIN — SODIUM CHLORIDE, PRESERVATIVE FREE 10 ML: 5 INJECTION INTRAVENOUS at 08:20

## 2022-03-15 RX ADMIN — HEPARIN SODIUM 12 UNITS/KG/HR: 5000 INJECTION INTRAVENOUS; SUBCUTANEOUS at 01:45

## 2022-03-15 ASSESSMENT — ENCOUNTER SYMPTOMS
COUGH: 0
WHEEZING: 0
DIARRHEA: 0
VOMITING: 0
BLOOD IN STOOL: 0
CONSTIPATION: 0
SHORTNESS OF BREATH: 0
STRIDOR: 0
NAUSEA: 0
ABDOMINAL PAIN: 0

## 2022-03-15 ASSESSMENT — PAIN SCALES - GENERAL
PAINLEVEL_OUTOF10: 0

## 2022-03-15 ASSESSMENT — PAIN SCALES - WONG BAKER: WONGBAKER_NUMERICALRESPONSE: 0

## 2022-03-15 NOTE — PROCEDURES
INSTRUMENTAL SWALLOW REPORT  MODIFIED BARIUM SWALLOW    NAME: Crescencio Brown   : 1940  MRN: [id number]       Date of Eval: 3/15/2022              Referring Diagnosis(es):      Past Medical History:  has a past medical history of Dementia ([address] Gout, Hyperlipidemia, Hypertension, and Type II or unspecified type diabetes mellitus without mention of complication, not stated as uncontrolled. Past Surgical History:  has a past surgical history that includes [facility] (Right, 2022) and craniotomy (N/A, 2022). Current Diet Solid Consistency: Regular  Current Diet Liquid Consistency: Thin       Type of Study: Initial MBS      Patient Complaints/Reason for Referral:  Crescencio Brown was referred for a MBS to assess the efficiency of his/her swallow function, assess for aspiration, and to make recommendations regarding safe dietary consistencies, effective compensatory strategies, and safe eating environment. Onset of problem:      Crescencio Brown is a 80 y.o. Non- / non  female who presents with hypotension and is admitted to the hospital for the management of NSTEMI      Information is received from the chart of previous visits and handoff documentation as patient is unable to give HPI.       Patient presented to [facility] ED with a concern of hypotension, patient was brought from nearby nursing home [facility]  for the concern of hypotension a systolic blood pressure of 75, after being given 10 mg of PROAMATINE. Apparently patient was getting ready to be discharged from the skilled nursing facility and the blood pressure was noted to be in the 70s and EMS was called, at the time the patient had no complaints.     Upon arrival by EMS patient's blood pressure was noted to be systolic 30V. Work up in the emergency room                Behavior/Cognition/Vision/Hearing:  Behavior/Cognition: Alert; Cooperative    Impressions:  Patient presents with  safe swallow for Regular diet with thin liquids as evidenced by no observed aspiration noted with consistencies tested. Recommend small sips and bites, only feed when alert and awake and upright at 90 degrees for all PO intake. Recommend close monitoring for overt/clinical s/s of aspiration and D/C PO intake and complete Modified Barium Swallow Study should they occur. Results and recommendations reported to RN. Patient Position: Lateral and Patient Degrees: 90      Consistencies Administered: Dysphagia Soft and Bite-Sized (Dysphagia III); Reg solid; Dysphagia Pureed (Dysphagia I); Thin straw;Nectar  teaspoon;Nectar straw    Compensatory Swallowing Strategies Attempted: Alternate solids and liquids;Eat/Feed slowly;Upright as possible for all oral intake;Small bites/sips  Postural Changes and/or Swallow Maneuvers Trialed: Upright 90 degrees      Recommended Diet:  Solid consistency: Regular  Liquid consistency: Thin  Liquid administration via: Cup;Straw    Medication administration: PO    Safe Swallow Protocol:  Supervision: Distant  Compensatory Swallowing Strategies: Alternate solids and liquids;Eat/Feed slowly;Upright as possible for all oral intake;Small bites/sips      Recommendations/Treatment  Requires SLP Intervention: Yes        D/C Recommendations: To be determined  Postural Changes and/or Swallow Maneuvers: Upright 90 degrees      Recommended Exercises:    Therapeutic Interventions: Diet tolerance monitoring         Education: Images and recommendations were reviewed with pt following this exam.   Patient Education: yes  Patient Education Response: Verbalizes understanding    Prognosis  Prognosis for safe diet advancement: good  Duration/Frequency of Treatment  Duration/Frequency of Treatment: 2-3X/week      Goals:    Long Term:        To Maximize safety with intake, optimize nutrition/hydration and minimize risk for aspiration. Short Term:   Goals:  The patient will tolerate recommended diet without observed clinical signs of aspiration      Oral Preparation / Oral Phase  Oral Phase: WFL      Oral Phase: Adequate mastication and oral manipulation for consistencies tested. Decreased bolus formation with spill to pyriform with liquids/soft and dry siolids, Spill to vallec with Puree.         Pharyngeal Phase  Pharyngeal Phase: [facility]  Pharyngeal: Thin, Thick, Puree, Fruit and Cracker: No penetration and no aspiration with no stasis    Dysphagia Outcome Severity Scale: Level 7: Normal in all situations  Penetration-Aspiration Scale (PAS): 1 - Material does not enter the airway      Esophageal Phase  Esophageal Screen: [facility]        Pain   Patient Currently in Pain: No  Pain Level: 0      Therapy Time:   Individual Concurrent Group Co-treatment   Time In 1045         Time Out 1100         Minutes 15                   SANJEEV Fortune, 3/15/2022, 11:18 AM

## 2022-03-15 NOTE — PROGRESS NOTES
[facility] ([facility])  Occupational Therapy Not Seen Note    DATE: 3/15/2022    NAME: Serge Katz  MRN: [id number]   : 1940      Patient not seen this date for Occupational Therapy due to:       Off Unit at [facility]     Next Scheduled Treatment: Ck 3/15 or 3/16     Electronically signed by Pillo Nair OT on 3/15/2022 at 10:56 AM

## 2022-03-15 NOTE — CARE COORDINATION
03/15/22 1707   Readmission Assessment   Number of Days since last admission? 8-30 days   Previous Disposition SNF   Who is being Interviewed Caregiver   Did you visit your Primary Care Physician after you left the hospital, before you returned this time? No   Why weren't you able to visit your PCP? Did not have an appointment   Did you see a specialist, such as Cardiac, Pulmonary, Orthopedic Physician, etc. after you left the hospital?   (SNF physicians)   In our efforts to provide the best possible care to you and others like you, can you think of anything that we could have done to help you after you left the hospital the first time, so that you might not have needed to return so soon?  Other (Comment)  (hypotension)

## 2022-03-15 NOTE — PLAN OF CARE
Please page neurosurgery when CT head is completed      Electronically signed by GRACIE Bronson NP on 3/15/2022 at 6:15 AM

## 2022-03-15 NOTE — PROGRESS NOTES
Physical Therapy        Physical Therapy Cancel Note      DATE: 3/15/2022    NAME: Ángel Fernando  MRN: [id number]   : 1940      Patient not seen this date for Physical Therapy due to:    Testing: pt off unit at barium swallow study. PT will check back this PM as time allows or 3/16/21.        Electronically signed by Adilson Plascencia PT on 3/15/2022 at 11:04 AM

## 2022-03-15 NOTE — ED NOTES
Writer attempted to call report to receiving RN at [facility]. Receiving RN to call back.       Nura Solorio RN  03/14/22 0534

## 2022-03-15 NOTE — H&P
[facility]  Office: [address], DO, Taylor Dillard, DO, Priyank Rocha, DO, Eli Leigh, DO, James Garland MD, Christina Son MD, El Mariano MD, Morro Mondragon MD, Otis Hutson MD, Carolynn Snellen, MD, Arsalan Chakraborty MD, Vargas Dias, DO, Damaris Latham, DO, Jie Bailey MD,  Gallo Farmer DO, Jennifer Price MD, Meghana Garcia MD, Ruchi Pina MD, Bushra Coelho DO, Paulo Carter MD, Elieser Coleman MD, Nelson Ruth [facility], [facility], CNP, Kisha Pelaez, CNP, Ran Goldberg, CNS, Janessa Garrison, CNP, Gennaro Salcido, CNP, Abi Burgos, CNP, Governor Jackson, CNP, Kristian Clifford CNP, Amira Xie PA-C, Indra Santos DNP, Irving Torres DNP, Lauren Bergeron CNP, Clifton Talbert, CNP, Gwen Briscoe CNP, Mirella Montero, CNP, Peg Simmons CNP, Isha Yoder CNP         [address]    HISTORY AND PHYSICAL EXAMINATION            Date:   3/14/2022  Patient name:  Lindy Lambert  Date of admission:  3/14/2022 10:00 PM  MRN:   [id number]  Account:  [de-identified]  YOB: 1940  PCP:    Justin Cordova PA-C  Room:   8948/7155-35  Code Status:    Prior    Chief Complaint:   hypotension    History Obtained From:     electronic medical record    History of Present Illness:     Lindy Lambert is a 80 y.o. Non- / non  female who presents with hypotension and is admitted to the hospital for the management of NSTEMI     Information is received from the chart of previous visits and handoff documentation as patient is unable to give HPI. Patient presented to [facility] ED with a concern of hypotension, patient was brought from nearby nursing home [facility].)  for the concern of hypotension a systolic blood pressure of 75, after being given 10 mg of PROAMATINE.     Apparently patient was getting ready to be discharged from the skilled nursing facility and the blood pressure was noted to be in 2/19/2022    G[facility]IAN HOLE FOR SUBDURAL HEMATOMA EVACUATION performed by Antonette Nissen, MD at [place] [address]        Medications Prior to Admission:     Prior to Admission medications    Medication Sig Start Date End Date Taking? Authorizing Provider   acetaminophen (TYLENOL) 500 MG tablet Take 1,000 mg by mouth 2 times daily    Historical Provider, MD   amLODIPine (NORVASC) 10 MG tablet Take 1 tablet by mouth daily 3/1/22   Satinder Montgomery MD   sitaGLIPtan-metFORMIN (JANUMET)  MG per tablet TAKE ONE TABLET BY MOUTH DAILY WITH SUPPER 2/26/22   Sondra Richard MD   sennosides-docusate sodium (SENOKOT-S) 8.6-50 MG tablet Take 2 tablets by mouth daily 2/26/22   Sondra Richard MD   losartan-hydroCHLOROthiazide [facility]) 100-25 MG per tablet Take 1 tablet by mouth daily 2/26/22   Sondra Richard MD   atorvastatin (LIPITOR) 40 MG tablet TAKE ONE TABLET BY MOUTH DAILY 4/11/18   Jero Fuller PA-C        Allergies:     Patient has no known allergies. Social History:     Tobacco:    reports that she quit smoking about 41 years ago. Her smoking use included cigarettes. She quit after 20.00 years of use. She has never used smokeless tobacco.  Alcohol:      has no history on file for alcohol use. Drug Use:  has no history on file for drug use. Family History:     Family History   Problem Relation Age of Onset    High Blood Pressure Mother        Review of Systems:     Positive and Negative as described in HPI. Review of Systems   Unable to perform ROS: Dementia (PATIENT DENIES ALL ROS ASKED IN THE POSITIVE AND NEGATIVE INFLECTION)   Constitutional: Negative for chills, diaphoresis and fever. HENT: Negative for congestion and hearing loss. Respiratory: Negative for cough, shortness of breath, wheezing and stridor. Cardiovascular: Negative for chest pain, palpitations and leg swelling. Gastrointestinal: Negative for abdominal pain, blood in stool, constipation, diarrhea, nausea and vomiting.    Genitourinary: Negative for dysuria and frequency. Musculoskeletal: Negative for myalgias. Skin: Negative for rash. Neurological: Negative for dizziness, seizures and headaches. Psychiatric/Behavioral: The patient is not nervous/anxious. Physical Exam:   /71   Pulse 78   Temp 97.3 °F (36.3 °C) (Oral)   Resp 16   SpO2 94%   Temp (24hrs), Av.4 °F (36.3 °C), Min:97.3 °F (36.3 °C), Max:97.4 °F (36.3 °C)    No results for input(s): POCGLU in the last 72 hours. No intake or output data in the 24 hours ending 220    Physical Exam  Vitals and nursing note reviewed. Constitutional:       General: She is not in acute distress. Appearance: She is well-developed. She is not ill-appearing or diaphoretic. Comments: Fatigued appearing   HENT:      Head: Normocephalic and atraumatic. Right Ear: Hearing normal.      Left Ear: Hearing normal.      Nose: Nose normal. No rhinorrhea. Eyes:      General: Lids are normal.      Extraocular Movements:      Right eye: Normal extraocular motion. Left eye: Normal extraocular motion. Conjunctiva/sclera: Conjunctivae normal.      Right eye: Right conjunctiva is not injected. Left eye: Left conjunctiva is not injected. Pupils: Pupils are equal, round, and reactive to light. Pupils are equal.      Right eye: Pupil is reactive. Left eye: Pupil is reactive. Neck:      Thyroid: No thyromegaly. Vascular: No carotid bruit. Trachea: Trachea and phonation normal. No tracheal deviation. Cardiovascular:      Rate and Rhythm: Normal rate and regular rhythm. Pulses: Normal pulses. Heart sounds: Normal heart sounds. No murmur heard. Pulmonary:      Effort: Pulmonary effort is normal. No respiratory distress. Breath sounds: Normal breath sounds. No stridor. No decreased breath sounds. Abdominal:      General: Bowel sounds are normal. There is no distension. Palpations: Abdomen is soft. There is no mass. Tenderness: There is no abdominal tenderness. There is no guarding. Musculoskeletal:         General: No tenderness. Cervical back: Neck supple. Comments: Generalized weakness to upper and lower extremities moves all extremities spontaneously   Skin:     General: Skin is warm and dry. Findings: No erythema, lesion or rash. Neurological:      Mental Status: She is easily aroused. She is lethargic and disoriented. GCS: GCS eye subscore is 4. GCS verbal subscore is 4. GCS motor subscore is 6. Cranial Nerves: Dysarthria present. No cranial nerve deficit. Sensory: Sensory deficit present. Motor: Weakness, abnormal muscle tone and pronator drift present. Psychiatric:         Speech: Speech normal.         Behavior: Behavior normal. Behavior is cooperative.          Investigations:      Laboratory Testing:  Recent Results (from the past 24 hour(s))   CBC with Auto Differential    Collection Time: 03/14/22  2:01 PM   Result Value Ref Range    WBC 9.4 3.5 - 11.0 k/uL    RBC 4.78 4.0 - 5.2 m/uL    Hemoglobin 15.2 12.0 - 16.0 g/dL    Hematocrit 46.0 36 - 46 %    MCV 96.1 80 - 100 fL    MCH 31.8 26 - 34 pg    MCHC 33.1 31 - 37 g/dL    RDW 16.4 (H) 12.5 - 15.4 %    Platelets 417 300 - 837 k/uL    MPV 8.9 6.0 - 12.0 fL    Seg Neutrophils 77 (H) 36 - 66 %    Lymphocytes 13 (L) 24 - 44 %    Monocytes 8 2 - 11 %    Eosinophils % 1 1 - 4 %    Basophils 1 0 - 2 %    Segs Absolute 7.30 1.8 - 7.7 k/uL    Absolute Lymph # 1.30 1.0 - 4.8 k/uL    Absolute Mono # 0.70 0.1 - 1.2 k/uL    Absolute Eos # 0.00 0.0 - 0.4 k/uL    Basophils Absolute 0.00 0.0 - 0.2 k/uL   Basic Metabolic Panel    Collection Time: 03/14/22  2:01 PM   Result Value Ref Range    Glucose 165 (H) 70 - 99 mg/dL    BUN 48 (H) 8 - 23 mg/dL    CREATININE 1.50 (H) 0.50 - 0.90 mg/dL    Calcium 10.1 8.6 - 10.4 mg/dL    Sodium 149 (H) 135 - 144 mmol/L    Potassium 4.8 3.7 - 5.3 mmol/L    Chloride 109 (H) 98 - 107 mmol/L    CO2 21 20 - 31 mmol/L    Anion Gap 19 (H) 9 - 17 mmol/L    GFR Non-African American 33 (L) >60 mL/min    GFR African American 40 (L) >60 mL/min    GFR Comment         Troponin    Collection Time: 03/14/22  2:01 PM   Result Value Ref Range    Troponin, High Sensitivity 233 (HH) 0 - 14 ng/L   Lactate, Sepsis    Collection Time: 03/14/22  2:01 PM   Result Value Ref Range    Lactic Acid, Sepsis 4.4 (H) 0.5 - 1.9 mmol/L   Brain Natriuretic Peptide    Collection Time: 03/14/22  2:01 PM   Result Value Ref Range    Pro-BNP 11,047 (H) <300 pg/mL   Lactate, Sepsis    Collection Time: 03/14/22  3:54 PM   Result Value Ref Range    Lactic Acid, Sepsis 4.2 (H) 0.5 - 1.9 mmol/L   Troponin    Collection Time: 03/14/22  3:54 PM   Result Value Ref Range    Troponin, High Sensitivity 211 (HH) 0 - 14 ng/L       Imaging/Diagnostics:  CT HEAD WO CONTRAST    Result Date: 3/14/2022  From the previously noted areas of subarachnoid hemorrhage only minimal subarachnoid hemorrhage is left within the right frontal convexity sulci. The other areas have resolved. Changes related to right frontal craniotomy with interval removal of right subdural drain with 12 mm chronic subdural collection within the right frontoparietal region with 3-4 mm leftward midline shift. . Subdural collection is unchanged in size however the acute blood products have resolved. No evidence for acute intraparenchymal hemorrhage, territorial infarction or intracranial mass lesion. Moderate chronic microangiopathic ischemic disease. Unchanged foci of chronic lacunar infarction Mild generalized volume loss. RECOMMENDATIONS: Unavailable     XR CHEST PORTABLE    Result Date: 3/14/2022  No acute cardiopulmonary process.        Assessment :      Hospital Problems           Last Modified POA    * (Principal) NSTEMI (non-ST elevated myocardial infarction) ([facility] 3/14/2022 Yes    Essential hypertension 3/15/2022 Yes    Type 2 diabetes mellitus without complication, without long-term current use of insulin ([facility] [address] 3/15/2022 Yes    Subdural hematoma ([facility] 3/15/2022 Yes    S/P evacuation of subdural hematoma 3/15/2022 Yes    Acute dehydration 3/15/2022 Yes    Hypotension due to hypovolemia 3/15/2022 Yes          Plan:     Patient status inpatient in the Progressive Unit/Step down    Consultation to cardiology  Continue to trend troponin and BNP look for pattern rise or clinical EKG changes  No signs of fluid overload with the elevated BNP level suggest heart failure  IV fluid hydration for correction of the dehydration and the hypovolemia/hypotension. Will consult neurosurgery for professional opinion and risk benefit analysis of anticoagulation for the non-STEMI. Continue to trend lactic acid, concerned this is related to the hypotension. However will urinalysis chest x-rays NL, no leukocytosis, monitor for further signs of infection. Monitor glucose levels was mild hypoglycemic upon arrival and received D50. Given the lactic watch for signs of infection  GI prophylaxis with diet  DVT prophylaxis with heparin drip if able to be started, not utilize EPC cuffs  Medication reconciliation-obtain list from nursing home and update home med list orders placed    Consultations:   [address]     Patient is admitted as inpatient status because of co-morbidities listed above, severity of signs and symptoms as outlined, requirement for current medical therapies and most importantly because of direct risk to patient if care not provided in a hospital setting. Expected length of stay > 48 hours.     GRACIE Jung CNP  3/14/2022  10:20 PM    Copy sent to Dr. Vj Fan PA-C

## 2022-03-15 NOTE — ED NOTES
Report given to Cher Ulloa RN. All questions answered at this time.       Jyoti Wells RN  03/14/22 2123

## 2022-03-15 NOTE — PROGRESS NOTES
[facility]  Documentation Note                Admission Dx: NSTEMI (non-ST elevated myocardial infarction) ([facility] [address] [I21.4]    Past Medical History:   has a past medical history of [facility] Gout, Hyperlipidemia, Hypertension, and Type II or unspecified type diabetes mellitus without mention of complication, not stated as uncontrolled. Previous Testing:     ECHO 8/16/2021: EF 50-55%, mild TR, RVSP is 27 mmHg.      Previous office/hospital visit:   None    [facility]

## 2022-03-15 NOTE — CARE COORDINATION
Case Management Initial Discharge [address] [facility],             Met with: Marilee Panchal/cousin/guardian to discuss discharge plans. Information verified: address, contacts, phone number, , insurance Yes  Insurance Provider: [facility]    Emergency Contact/Next of Kin name & number: Curry Hayes [phone]  Who are involved in patient's support system? Perla    PCP: Dr. Rui Chun, [facility] Date of last visit: 6 months ago      Discharge Planning    Living Arrangements:    Lives in own home with assistance from cousin Arlen Munoz has 1 stories  0 stairs to climb to get into front door      Patient able to perform ADL's:Assisted    Current Services (outpatient & in home) [facility]  DME equipment: Clarnce Masker, wheelchair, bedside commode and hospital bed  DME provider:     Is patient receiving oral anticoagulation therapy? No    If indicated:   Physician managing anticoagulation treatment:   Where does patient obtain lab work for ATC treatment? Potential Assistance Needed:   SNF placement    Patient agreeable to home care: Yes  Freedom of choice provided:  yes, [facility]    Prior SNF/Rehab Placement and Facility: yes, [facility] and [address] [place] [address] Ln to SNF/Rehab: Yes  Theodore of choice provided: yes, interested in her returning to [address] Evaluation: no    Expected Discharge date:       Patient expects to be discharged to:   SNF    If home: is the family and/or caregiver wiling & able to provide support at home? yes  Who will be providing this support? Abraham Moraes    Follow Up Appointment: Best Day/ Time:      Transportation provider: ambulance  Transportation arrangements needed for discharge: Yes    Readmission Risk              Risk of Unplanned Readmission:  19             Does patient have a readmission risk score greater than 14?: Yes  If yes, follow-up appointment must be made within 7 days of discharge. Goals of Care:       Educated patients cousin/POA Jean Harrison on transitional options, provided freedom of choice and are agreeable with plan      Discharge Plan: Family is hopeful pt can go to [facility] at discharge. Pt was being released from [facility] on day she was admitted to the hospital.  Facility had made appeal to insurance company however, appeal was denied and pt was being released to home with [facility] care.             Electronically signed by VINAYAK Ann on 3/15/22 at 11:48 AM EDT

## 2022-03-15 NOTE — PROGRESS NOTES
PHARMACY NOTE:    The electrolyte replacement protocol for potassium/magnesium has been discontinued per P&T guidelines because the patient has reduced renal function (CrCl < 30 mL/min). The patient's most recent potassium & magnesium levels are:  Recent Labs     03/14/22  1401   K 4.8     Estimated Creatinine Clearance: 26 mL/min (A) (based on SCr of 1.5 mg/dL (H)). For patients with decreased renal function (below 30ml/min) needing potassium/magnesium supplementation, please order individual bolus doses with appropriate monitoring. Please contact the inpatient pharmacy with any concerns. Thank you.

## 2022-03-15 NOTE — CONSULTS
Attestation signed by      Attending Physician Statement:    I have discussed the care of  Raghu Rebolledo , including pertinent history and exam findings, with the Cardiology fellow/resident. I have seen and examined the patient and the key elements of all parts of the encounter have been performed by me. I agree with the assessment, plan and orders as documented by the fellow/resident, after I modified exam findings and plan of treatments, and the final version is my approved version of the assessment. Additional Comments:     Patient with recent admission for SDH s/p evacuation re-admitted with hypotension with SBP in 70's at [facility]. BP improved with iVF and midodrine. She is disoriented and unable to provide much history. ECG shows T wave abnormality in anteroseptal leads. HS troponins elevated 233,172,159 (downtredning) likely type II MI but underlying CAD not ruled out. Recommend to cont iv heparin x 48 hours  Continue asa and statin   Trend serial troponins  Repeat echo   Will eventually need cardiac cath once stable neurologically (not urgent and can be done as OP unless there is hemodynamic or electric instability)         [facility]    Consult / H&P               Today's Date: 3/15/2022  Patient Name: Raghu Rebolledo  Date of admission: 3/14/2022 10:00 PM  Patient's age: 80 y.o., 1940  Admission Dx: NSTEMI (non-ST elevated myocardial infarction) ([facility] [address] [I21.4]    Reason for Consult:  Cardiac evaluation    Requesting Physician: Libia Cortes DO    CHIEF COMPLAINT:  NSTEMI    History Obtained From:  electronic medical record, reason patient could not give history:  altered mental status    HISTORY OF PRESENT ILLNESS:      The patient is a 80 y.o.  female who is admitted to the hospital for NSTEMI mgmt. Patient unable to give hx due to confusion. Patient originally presented to OSH for hypotension, apparently sbp was 75 and was unresponsive to proamatine. Later improved to sbp of 90s. Most recent echo in 2021 showed EF of 50% to 55%. ekg show nonspecific changes. tropnins are down trending from 211 to 172 yesterday. At bedside patient is HDS and resting comfortably. Past Medical History:   has a past medical history of Dementia ([address] Gout, Hyperlipidemia, Hypertension, and Type II or unspecified type diabetes mellitus without mention of complication, not stated as uncontrolled. Past Surgical History:   has a past surgical history that includes [facility] (Right, 02/19/2022) and craniotomy (N/A, 2/19/2022). Home Medications:    Prior to Admission medications    Medication Sig Start Date End Date Taking?  Authorizing Provider   acetaminophen (TYLENOL) 500 MG tablet Take 1,000 mg by mouth 2 times daily    Historical Provider, MD   amLODIPine (NORVASC) 10 MG tablet Take 1 tablet by mouth daily 3/1/22   Vance Garg MD   sitaGLIPtan-metFORMIN (JANUMET)  MG per tablet TAKE ONE TABLET BY MOUTH DAILY WITH SUPPER 2/26/22   Hue Flanagan MD   sennosides-docusate sodium (SENOKOT-S) 8.6-50 MG tablet Take 2 tablets by mouth daily 2/26/22   Hue Flanagan MD   losartan-hydroCHLOROthiazide [facility]) 100-25 MG per tablet Take 1 tablet by mouth daily 2/26/22   Hue Flanagan MD   atorvastatin (LIPITOR) 40 MG tablet TAKE ONE TABLET BY MOUTH DAILY 4/11/18   Clint Pittman PA-C      Current Facility-Administered Medications: heparin 25,000 units in 0.9% sodium chloride 250 mL infusion, 5-30 Units/kg/hr, IntraVENous, Continuous  cefTRIAXone (ROCEPHIN) 1000 mg IVPB in NS 50ml minibag, 1,000 mg, IntraVENous, Q24H  dextrose 5 % solution, , IntraVENous, Continuous  aspirin chewable tablet 81 mg, 81 mg, Oral, Daily  acetaminophen (TYLENOL) tablet 1,000 mg, 1,000 mg, Oral, BID PRN  [Held by provider] amLODIPine (NORVASC) tablet 10 mg, 10 mg, Oral, Daily  atorvastatin (LIPITOR) tablet 40 mg, 40 mg, Oral, Daily  sennosides-docusate sodium (SENOKOT-S) 8.6-50 MG tablet 2 tablet, 2 tablet, Oral, Daily  sodium chloride flush 0.9 % injection 5-40 mL, 5-40 mL, IntraVENous, 2 times per day  sodium chloride flush 0.9 % injection 10 mL, 10 mL, IntraVENous, PRN  0.9 % sodium chloride infusion, 25 mL, IntraVENous, PRN  ondansetron (ZOFRAN-ODT) disintegrating tablet 4 mg, 4 mg, Oral, Q8H PRN **OR** ondansetron (ZOFRAN) injection 4 mg, 4 mg, IntraVENous, Q6H PRN  insulin lispro (HUMALOG) injection vial 0-12 Units, 0-12 Units, SubCUTAneous, TID WC  insulin lispro (HUMALOG) injection vial 0-6 Units, 0-6 Units, SubCUTAneous, Nightly  glucose (GLUTOSE) 40 % oral gel 15 g, 15 g, Oral, PRN  dextrose 50 % IV solution, 12.5 g, IntraVENous, PRN  glucagon (rDNA) injection 1 mg, 1 mg, IntraMUSCular, PRN  dextrose 5 % solution, 100 mL/hr, IntraVENous, PRN    Allergies:  Patient has no known allergies. Social History:   reports that she quit smoking about 41 years ago. Her smoking use included cigarettes. She quit after 20.00 years of use. She has never used smokeless tobacco.     Family History: family history includes High Blood Pressure in her mother. REVIEW OF SYSTEMS:    Unable to obtain     PHYSICAL EXAM:      BP (!) 104/56   Pulse 80   Temp 98.7 °F (37.1 °C) (Temporal)   Resp 16   SpO2 97%    Constitutional and General Appearance: nonverbal at baseline. does not follow commands  HEENT: PERRL, no cervical lymphadenopathy. No masses palpable. Normal oral mucosa  Respiratory:  Normal excursion and expansion without use of accessory muscles  Resp Auscultation: Good respiratory effort. No for increased work of breathing. On auscultation: clear to auscultation bilaterally  Cardiovascular:   The apical impulse is not displaced  Heart tones are crisp and normal. regular S1 and S2.  Jugular venous pulsation Normal  The carotid upstroke is normal in amplitude and contour without delay or bruit  Peripheral pulses are symmetrical and full   Abdomen:   No masses or tenderness  Bowel sounds present  Extremities:   No Cyanosis or Clubbing   Lower extremity edema: No   Skin: Warm and dry  Neurological:  Alert and oriented. Moves all extremities well  No abnormalities of mood, affect, memory, mentation, or behavior are noted    DATA:    Diagnostics:    EKG: normal sinus rhythm,   ECHO: ordered, but not yet obtained. Ejection fraction: 50%  Stress Test: not obtained. Cardiac Angiography: not obtained. Labs:     CBC:   Recent Labs     03/14/22  1401 03/15/22  0126   WBC 9.4 9.6   HGB 15.2 12.3   HCT 46.0 39.2    231     BMP:   Recent Labs     03/15/22  0239 03/15/22  0644   * 150*   K 4.0 4.0   CO2 24 20   BUN 49* 49*   CREATININE 1.22* 1.19*   LABGLOM 42* 44*   GLUCOSE 109* 99     BNP: No results for input(s): BNP in the last 72 hours. PT/INR:   Recent Labs     03/15/22  0644   PROTIME 12.7*   INR 1.2     APTT:  Recent Labs     03/15/22  0644   APTT 47.8*     CARDIAC ENZYMES:No results for input(s): CKTOTAL, CKMB, CKMBINDEX, TROPONINI in the last 72 hours. FASTING LIPID PANEL:  Lab Results   Component Value Date    HDL 92 10/19/2017    TRIG 56 10/19/2017     LIVER PROFILE:No results for input(s): AST, ALT, LABALBU in the last 72 hours.     IMPRESSION:    Patient Active Problem List   Diagnosis    Essential hypertension    Type 2 diabetes mellitus without complication, without long-term current use of insulin ([facility] [address]    Mixed hyperlipidemia    Non morbid obesity due to excess calories    Progressive cognitive dysfunction    Altered mental status    Dementia with behavioral disturbance (HCC)    Dementia with behavioral problem (HCC)    Subdural hematoma (HCC)    S/P evacuation of subdural hematoma    NSTEMI (non-ST elevated myocardial infarction) (HCC)    Acute dehydration    Hypotension due to hypovolemia       RECOMMENDATIONS:  Continue to trend troponins are currently downtrending  Currently on heparin gtt   patietn is s/p burrhole after subdrual hematoma, ok for heparin gtt per nsg  Monitor hemodynamics   F/u echo   Pursue ischemic work up when patient is doing better neurologically      Discussed with  Nurse.     Electronically signed by Anaid Ray MD on 3/15/2022 at 11:47 [address] [place] [address] [facility]      [phone]

## 2022-03-15 NOTE — CONSULTS
Dol[facility]  [facility].    Department of Neurosurgery  Resident Consult Note      Reason for Consult: Request for guidance on anticoagulation use for NSTEMI  Requesting Physician:  Felix Cochran  Neurosurgeon:   []Dr. Lockie Riedel  []Dr. Chris Spence  [x]Dr. Nohelia Iqbal    History Obtained From:  electronic medical record    CHIEF COMPLAINT:         Chest pain     HISTORY OF PRESENT ILLNESS:       The patient is a 80 y.o. female admitted to the hospitalist service due to NSTEMI. The patient was at an extended care facility after an admission for subdural hematoma with right to left shift. During that admission, she underwent bur hole with subdural evacuation on 2/19/2022 by Dr. Jan Mcclure. She was discharged to a SNF. Today she was post to discharge home from the SNF but was found to be hypotensive with blood pressure in the 82Z systolic and elevated troponin, consistent with NSTEMI. Patient admitted to the hospitalist service for management of NSTEMI. According to hospitalist service, the patient does not have any focal neurological deficits or neurological findings that would be concerning for worsening of subdural hematoma. In fact, repeat CT scan was obtained today showing stable appearance of chronic subdural.  Hospitalist service requesting neurosurgery guidance for use of heparin for management of NSTEMI.     PAST MEDICAL HISTORY :       Past Medical History:        Diagnosis Date    Dementia ([address]     Gout     Hyperlipidemia     Hypertension     Type II or unspecified type diabetes mellitus without mention of complication, not stated as uncontrolled        Past Surgical History:        Procedure Laterality Date    [de-identified] HOLE Right 02/19/2022    GILLIAN HOLE FOR HEMATOMA EVACUATION/EVDS to subdural space    CRANIOTOMY N/A 2/19/2022    GILLIAN HOLE FOR SUBDURAL HEMATOMA EVACUATION performed by Yen Robertson MD at [address] [place] [address] History:   Social History     Socioeconomic History    Marital status: Single Spouse name: Not on file    Number of children: Not on file    Years of education: Not on file    Highest education level: Not on file   Occupational History    Not on file   Tobacco Use    Smoking status: Former Smoker     Years: 20.00     Types: Cigarettes     Quit date: 1980     Years since quittin.8    Smokeless tobacco: Never Used   Substance and Sexual Activity    Alcohol use: Not on file    Drug use: Not on file    Sexual activity: Not on file   Other Topics Concern    Not on file   Social History Narrative    Not on file     Social Determinants of Health     Financial Resource Strain:     Difficulty of Paying Living Expenses: Not on file   Food Insecurity:     Worried About [address] in the Last Year: Not on file    [address] [employer] in the Last Year: Not on file   Transportation Needs:     Lack of Transportation (Medical): Not on file    Lack of Transportation (Non-Medical):  Not on file   Physical Activity:     Days of Exercise per Week: Not on file    Minutes of Exercise per Session: Not on file   Stress:     Feeling of Stress : Not on file   Social Connections:     Frequency of Communication with Friends and Family: Not on file    Frequency of Social Gatherings with Friends and Family: Not on file    Attends [employer] Services: Not on file    Active Member of [address] [place] [address] or Organizations: Not on file    Attends Club or Organization Meetings: Not on file    Marital Status: Not on file   Intimate Partner Violence:     Fear of Current or Ex-Partner: Not on file    Emotionally Abused: Not on file    Physically Abused: Not on file    Sexually Abused: Not on file   Housing Stability:     Unable to Pay for Housing in the Last Year: Not on file    Number of [place] in the Last Year: Not on file    Unstable Housing in the Last Year: Not on file       Family History:       Problem Relation Age of Onset    High Blood Pressure Mother        Allergies:   Patient has no known allergies. Home Medications:  Prior to Admission medications    Medication Sig Start Date End Date Taking?  Authorizing Provider   acetaminophen (TYLENOL) 500 MG tablet Take 1,000 mg by mouth 2 times daily    Historical Provider, MD   amLODIPine (NORVASC) 10 MG tablet Take 1 tablet by mouth daily 3/1/22   Nkechi Santiago MD   sitaGLIPtan-metFORMIN (JANUMET)  MG per tablet TAKE ONE TABLET BY MOUTH DAILY WITH SUPPER 2/26/22   nAtonieta Baer MD   sennosides-docusate sodium (SENOKOT-S) 8.6-50 MG tablet Take 2 tablets by mouth daily 2/26/22   Antonieta Baer MD   losartan-hydroCHLOROthiazide [facility]) 100-25 MG per tablet Take 1 tablet by mouth daily 2/26/22   Antonieta Baer MD   atorvastatin (LIPITOR) 40 MG tablet TAKE ONE TABLET BY MOUTH DAILY 4/11/18   Ana Neilsen PA-C       Current Medications:   Current Facility-Administered Medications: heparin 25,000 units in 0.9% sodium chloride 250 mL infusion, 5-30 Units/kg/hr, IntraVENous, Continuous  acetaminophen (TYLENOL) tablet 1,000 mg, 1,000 mg, Oral, BID  [Held by provider] amLODIPine (NORVASC) tablet 10 mg, 10 mg, Oral, Daily  atorvastatin (LIPITOR) tablet 40 mg, 40 mg, Oral, Daily  sennosides-docusate sodium (SENOKOT-S) 8.6-50 MG tablet 2 tablet, 2 tablet, Oral, Daily  sodium chloride flush 0.9 % injection 5-40 mL, 5-40 mL, IntraVENous, 2 times per day  sodium chloride flush 0.9 % injection 10 mL, 10 mL, IntraVENous, PRN  0.9 % sodium chloride infusion, 25 mL, IntraVENous, PRN  ondansetron (ZOFRAN-ODT) disintegrating tablet 4 mg, 4 mg, Oral, Q8H PRN **OR** ondansetron (ZOFRAN) injection 4 mg, 4 mg, IntraVENous, Q6H PRN  insulin lispro (HUMALOG) injection vial 0-12 Units, 0-12 Units, SubCUTAneous, TID WC  insulin lispro (HUMALOG) injection vial 0-6 Units, 0-6 Units, SubCUTAneous, Nightly  glucose (GLUTOSE) 40 % oral gel 15 g, 15 g, Oral, PRN  dextrose 50 % IV solution, 12.5 g, IntraVENous, PRN  glucagon (rDNA) injection 1 mg, 1 mg, IntraMUSCular, PRN  dextrose 5 % solution, 100 mL/hr, IntraVENous, PRN    REVIEW OF SYSTEMS:       See HPI by admitting service    PHYSICAL EXAM:       Vitals:    03/15/22 0145   BP: (!) 96/59   Pulse: 74   Resp: 15   Temp: 98.2 °F (36.8 °C)   SpO2:        Patient was not evaluated in person    LABS AND IMAGING:     Labs:  CBC with Differential:    Lab Results   Component Value Date    WBC 9.4 03/14/2022    RBC 4.78 03/14/2022    HGB 15.2 03/14/2022    HCT 46.0 03/14/2022     03/14/2022    MCV 96.1 03/14/2022    MCH 31.8 03/14/2022    MCHC 33.1 03/14/2022    RDW 16.4 03/14/2022    LYMPHOPCT 13 03/14/2022    MONOPCT 8 03/14/2022    BASOPCT 1 03/14/2022    MONOSABS 0.70 03/14/2022    LYMPHSABS 1.30 03/14/2022    EOSABS 0.00 03/14/2022    BASOSABS 0.00 03/14/2022    DIFFTYPE NOT REPORTED 02/21/2022     BMP:    Lab Results   Component Value Date     03/14/2022    K 4.8 03/14/2022     03/14/2022    CO2 21 03/14/2022    BUN 48 03/14/2022    LABALBU 3.8 02/17/2022    CREATININE 1.50 03/14/2022    CALCIUM 10.1 03/14/2022    GFRAA 40 03/14/2022    LABGLOM 33 03/14/2022    GLUCOSE 165 03/14/2022       Radiology Review:    EXAMINATION:   CT OF THE HEAD WITHOUT CONTRAST  3/14/2022 3:06 pm       TECHNIQUE:   CT of the head was performed without the administration of intravenous   contrast. Dose modulation, iterative reconstruction, and/or weight based   adjustment of the mA/kV was utilized to reduce the radiation dose to as low   as reasonably achievable.       COMPARISON:   Head CT of 02/24/2022       HISTORY:   ORDERING SYSTEM PROVIDED HISTORY: Status post craniotomy   TECHNOLOGIST PROVIDED HISTORY:       Status post craniotomy   Decision Support Exception - unselect if not a suspected or confirmed   emergency medical condition->Emergency Medical Condition ([place])   Reason for Exam: hypotension, s/p craniotomy       FINDINGS:   From the previously noted areas of subarachnoid hemorrhage only minimal   subarachnoid hemorrhage is left within the right frontal convexity sulci. The other areas have resolved.       Changes related to right frontal craniotomy with interval removal of right   subdural drain with 12 mm chronic subdural collection within the right   frontoparietal region with 3-4 mm leftward midline shift. Victorina Hones   collection is unchanged in size however the acute blood products have   resolved.       There is no acute infarction, intraparenchymal hemorrhage or intracranial   mass lesion. No mass effect, midline shift or extra-axial collection is noted.       There are unchanged moderate nonspecific foci of periventricular and   subcortical cerebral white matter hypodensity, most likely representing   chronic microangiopathic disease in this age group.  Unchanged chronic   lacunar infarction of right thalamus and right basal ganglia.  The brain   parenchyma is otherwise normal. The cerebellar tonsils are in normal position.       The ventricles, sulci, and cisterns are mildly prominent suggestive of mild   generalized volume loss.       The globes and orbits are within normal limits. The visualized extracranial   structures including paranasal sinuses and mastoid air cells are   unremarkable. No fracture is identified.           Impression       From the previously noted areas of subarachnoid hemorrhage only minimal   subarachnoid hemorrhage is left within the right frontal convexity sulci. The   other areas have resolved.       Changes related to right frontal craniotomy with interval removal of right   subdural drain with 12 mm chronic subdural collection within the right   frontoparietal region with 3-4 mm leftward midline shift. . Subdural   collection is unchanged in size however the acute blood products have   resolved.       No evidence for acute intraparenchymal hemorrhage, territorial infarction or   intracranial mass lesion.       Moderate chronic microangiopathic ischemic disease.  Unchanged foci of   chronic lacunar infarction       Mild generalized volume loss.       RECOMMENDATIONS:   Unavailable         ASSESSMENT AND PLAN:       Patient Active Problem List   Diagnosis    Essential hypertension    Type 2 diabetes mellitus without complication, without long-term current use of insulin (HCC)    Mixed hyperlipidemia    Non morbid obesity due to excess calories    Progressive cognitive dysfunction    Altered mental status    Dementia with behavioral disturbance (HCC)    Dementia with behavioral problem ([facility] [address]    Subdural hematoma (HCC)    S/P evacuation of subdural hematoma    NSTEMI (non-ST elevated myocardial infarction) ([facility] [address]    Acute dehydration    Hypotension due to hypovolemia       A/P:  Caitlin Roberts is a 80 y.o. female who presents with hypotension, elevated troponin, consistent with NSTEMI. Neurosurgery consulted due to patient recently having had a subdural hematoma requiring bur hole evacuation. Hospitalist service requesting guidance on use of heparin infusion for management. Patient care will be discussed with attending, will reevaluate patient along with attending     - Tasia Moseley to start heparin infusion; do not recommend bolus              - No neurosurgical interventions planned for now   - Obtain CT head in 2 days or after discontinuing heparin, whichever comes first   - Neuro checks per protocol    Additional recommendations may follow    Please contact neurosurgery with any changes in patient's neurologic status. Thank you for your consult. Dr. Marjan Greene MD  Emergency Medicine Resident, PGY-2  Neurosurgery/Neuro Critical Care Service  3/15/2022 2:19 AM      Please note that this chart was generated using voice recognition Dragon dictation software. Although every effort was made to ensure the accuracy of this automated transcription, some errors in transcription may have occurred.

## 2022-03-15 NOTE — PROGRESS NOTES
Occupational Therapy   Occupational Therapy Initial Assessment  Date: 3/15/2022   Patient Name: Ria Lomas  MRN: [id number]     : 1940    Date of Service: 3/15/2022  Obtained from medical chart:   \" 80 y.o.  female who is admitted to the hospital for NSTEMI mgmt. Patient unable to give hx due to confusion. \"    Discharge Recommendations:  Patient would benefit from continued therapy after discharge  OT Equipment Recommendations  Equipment Needed: Yes  Mobility Devices: ADL Assistive Devices  ADL Assistive Devices: Toileting - Drop Arm Commode, Heavy Duty Drop Arm Commode;Transfer Tub Bench;Grab Bars - toilet;Grab Bars - shower;Gait Belt    Assessment   Performance deficits / Impairments: Decreased functional mobility ; Decreased endurance;Decreased high-level IADLs;Decreased ADL status; Decreased safe awareness;Decreased balance;Decreased strength;Decreased ROM; Decreased fine motor control;Decreased coordination;Decreased posture;Decreased vision/visual deficit; Decreased cognition  Assessment: Patient supine upon arrival, demo decreased cognition throughout impacting following of commands and responses to social questions to obtain PLOF. Pt required Max A x2 to complete bed mobility and functional transfer. Pt required Max A for brief mgmt and personal hygiene, rolling R<>L to complete at Max A. Pt engaged in self-feeding, noting to have decreased coordination in the L hand and decreased visual attention to the L impacting performance. Pt provided with built up handle to increase ease of self-feeding task. Patient would benefit from continued acute OT services to address functional deficits through skilled intervention of ADL and IADL compensatory training, balance, safety and transfer training, education of EC/WS techs and implementation, use of AE/DME to increase independence, and [address] [facility] strengthening activities to improve function for ADLs to promote functional outcomes.    Prognosis: Fair  Decision Making: Medium Complexity  Patient Education: OT role, OT POC, purpose of evaluation, hand placement for transfers, activity promotion, reorientation, maintaining skin integrity - fair/poor return  REQUIRES OT FOLLOW UP: Yes  Activity Tolerance  Activity Tolerance: Treatment limited secondary to decreased cognition;Patient limited by pain  Safety Devices  Safety Devices in place: Yes  Type of devices: Gait belt;Call light within reach;Nurse notified; Left in bed;Bed alarm in place; Patient at risk for falls  Restraints  Initially in place: No        Patient Diagnosis(es): There were no encounter diagnoses. has a past medical history of Dementia ([facility] [address] Gout, Hyperlipidemia, Hypertension, and Type II or unspecified type diabetes mellitus without mention of complication, not stated as uncontrolled. has a past surgical history that includes [facility] (Right, 02/19/2022) and craniotomy (N/A, 2/19/2022). Restrictions  Restrictions/Precautions  Restrictions/Precautions: Fall Risk  Required Braces or Orthoses?: No  Position Activity Restriction  Other position/activity restrictions: up w/assist    Subjective   General  Patient assessed for rehabilitation services?: Yes  Family / Caregiver Present: No  General Comment  Comments: RN ok'd patient for OT/PT evaluation. Pt pleasant and cooperative throughout, agreeable to evaluation.   Patient Currently in Pain: Denies  Pain Assessment  Pain Assessment: Faces  Pain Level: 0  Covington-Baker Pain Rating: No hurt  Vital Signs  Pulse: 70  Heart Rate Source: Monitor  Resp: 17  BP: 118/69  BP Location: Right upper arm  MAP (mmHg): 81  Patient Position: Left side  Patient Currently in Pain: Denies  Oxygen Therapy  SpO2: 99 %     Social/Functional History  Social/Functional History  Additional Comments: Unable to obtain social/ functional hx due to pt's cognition     Objective   Vision:  (unable to formally assess due to pt's cognition)  Hearing:  (unable to formally assess due to pt's cognition)    Orientation  Overall Orientation Status: Impaired  Orientation Level: Disoriented to place;Oriented to person     Balance  Sitting Balance: Minimal assistance (CGA with intermittent Min A with dynamic tasks for posterior lean and L lateral lean)  Standing Balance: Maximum assistance (x2)  Standing Balance  Time: ~25 sec  Activity: static EOB  Comment: poor posture  ADL  Feeding: Setup;Verbal cueing; Increased time to complete; Moderate assistance  Grooming: Moderate assistance; Increased time to complete;Setup;Verbal cueing  UE Bathing: Setup;Verbal cueing; Increased time to complete; Moderate assistance  LE Bathing: Increased time to complete;Setup;Verbal cueing;Maximum assistance  UE Dressing: Setup;Verbal cueing; Increased time to complete; Moderate assistance  LE Dressing: Setup;Verbal cueing; Increased time to complete;Maximum assistance  Toileting: Increased time to complete;Setup;Maximum assistance  Additional Comments: Patient required Max A to complete rolling R <> L to engage in personal hygiene with toileting tasks. Pt demo ability to complete front personal hygiene lying supine with set up, VC and increased time. Pt engaged in self-feeding tasks at Mod A, using built up utensil with fair success. Pt demo decreased awareness of errors, increasing spillage with spoon usage. OT facilitated increase ease with eating grapes, placing on patient's R side and utilizing the L UE to stabilize the bowl and using the R UE for grasping and bringing grapes to mouth. Tone RUE  RUE Tone: Normotonic  Tone LUE  LUE Tone: Normotonic  Coordination  Movements Are Fluid And Coordinated: No  Coordination and Movement description: Fine motor impairments;Tremors;Decreased speed;Decreased accuracy; Left UE     Bed mobility  Rolling to Left: Maximum assistance  Rolling to Right: Maximum assistance  Supine to Sit: Maximum assistance;2 Person assistance  Sit to Supine: Maximum assistance;2 Person assistance  Comment: Patient required Max visual and verbal cues to progress B LE and trunk for bed mobility. Pt required Max A for rolling R<>L, able to maintain  on bedrail to participate in maintaining sidelying position  Transfers  Sit to stand: 2 Person assistance;Maximum assistance  Stand to sit: Maximum assistance;2 Person assistance     Cognition  Overall Cognitive Status: Exceptions  Arousal/Alertness: Inconsistent responses to stimuli  Following Commands: Follows one step commands with repetition; Inconsistently follows commands  Attention Span: Difficulty dividing attention; Difficulty attending to directions  Memory: Decreased recall of biographical Information;Decreased recall of recent events;Decreased short term memory;Decreased long term memory  Safety Judgement: Decreased awareness of need for assistance;Decreased awareness of need for safety  Problem Solving: Decreased awareness of errors;Assistance required to correct errors made;Assistance required to identify errors made  Insights: Not aware of deficits  Initiation: Requires cues for all  Sequencing: Requires cues for all  Cognition Comment: Patient responds to \"yes\"/\"no\" questions with increased time        Sensation  Overall Sensation Status:  (unable to formally assess due to pt's cognition)    LUE PROM: WFL  Left Hand AROM: WFL  RUE PROM: WFL  Right Hand AROM: WFL  LUE Strength  LUE Strength Comment: at least 4-/5 based on functional performance; unable to formally assess d/t cognition  RUE Strength  RUE Strength Comment: at least 4-/5 based on functional performance; unable to formally assess d/t cognition      Plan   Plan  Times per week: 3-4x/wk  Current Treatment Recommendations: Strengthening,Endurance Training,Patient/Caregiver Education & Training,Equipment Evaluation, Education, & procurement,Self-Care / ADL,Home Management Training,Safety Education & Training,Functional Mobility Training,Balance Training,Positioning    AM-PAC

## 2022-03-15 NOTE — ED NOTES
Status unchanged. RR even and non labored. NAD noted at this time. VSS at this time. Call light within reach.       Ryann Hdez RN  03/14/22 2021

## 2022-03-15 NOTE — PROGRESS NOTES
Physical Therapy    Facility/Department: [facility]  Initial Assessment    NAME: Génesis Park  : 1940  MRN: [id number]    Date of Service: 3/15/2022  Federica Walker is a 80 y.o. female who presents to the emergency room by ambulance from the nursing home for hypotension. Patient denies pain but poor historian. Blood pressure 75 systolic at the nursing home on arrival 90. No other history able to be obtained before family arrived. \"  Discharge Recommendations:    Further therapy recommended at discharge. PT Equipment Recommendations  Equipment Needed: No (pt currently unsafe to perform functional mobility unassisted)    Assessment   Body structures, Functions, Activity limitations: Decreased functional mobility ; Decreased balance;Decreased ROM; Decreased strength;Decreased safe awareness;Decreased cognition;Decreased endurance;Decreased coordination;Decreased vision/visual deficit  Assessment: Pt required maxAx2 to perform bed mobility and attempt functional transfers this date, pt unable to maintain standing balance despite maximum assistance. Pt is a high fall risk and would be unsafe to perform functional mobility unassisted. Recommending continued skilled physical therapy to address functional mobility deficits to return pt to prior baseline function. Prognosis: Fair  Decision Making: Medium Complexity  PT Education: Goals;PT Role;Plan of Care  Barriers to Learning: Pt's cognition  REQUIRES PT FOLLOW UP: Yes  Activity Tolerance  Activity Tolerance: Patient limited by cognitive status       Patient Diagnosis(es): There were no encounter diagnoses. has a past medical history of Dementia ([facility] [address] Gout, Hyperlipidemia, Hypertension, and Type II or unspecified type diabetes mellitus without mention of complication, not stated as uncontrolled.    has a past surgical history that includes [facility] (Right, 2022) and craniotomy (N/A, 2/19/2022). Restrictions  Restrictions/Precautions  Restrictions/Precautions: Fall Risk  Required Braces or Orthoses?: No  Position Activity Restriction  Other position/activity restrictions: up w/assist  Vision/Hearing  Vision:  (unable to formally assess due to pt's cognition)  Hearing:  (unable to formally assess due to pt's cognition)     Subjective  General  Patient assessed for rehabilitation services?: Yes  Response To Previous Treatment: Not applicable  Family / Caregiver Present: No  Follows Commands: Impaired (pt follows ~50% of one step commands)  Subjective  Subjective: RN and pt in agreement for PT eval; pt supine in bed upon PT arrival, pt pleasantly confused throughout session  Pain Screening  Patient Currently in Pain: Denies  Vital Signs  Patient Currently in Pain: Denies       Orientation  Orientation  Overall Orientation Status: Impaired  Orientation Level: Oriented to person;Disoriented to place; Disoriented to time;Disoriented to situation  Social/Functional History  Social/Functional History  Additional Comments: Unable to obtain social/ functional hx due to pt's cognition  Cognition   Cognition  Overall Cognitive Status: Exceptions  Arousal/Alertness: Inconsistent responses to stimuli  Following Commands: Follows one step commands with repetition; Inconsistently follows commands  Attention Span: Difficulty dividing attention; Difficulty attending to directions  Memory: Decreased recall of biographical Information;Decreased recall of recent events;Decreased short term memory;Decreased long term memory  Safety Judgement: Decreased awareness of need for assistance;Decreased awareness of need for safety  Problem Solving: Decreased awareness of errors;Assistance required to correct errors made;Assistance required to identify errors made  Insights: Not aware of deficits  Initiation: Requires cues for all  Sequencing: Requires cues for all    Objective  Joint Mobility  ROM RLE: WFL  ROM LLE: WFL  ROM RUE: See OT assessment- PT/ OT coeval  ROM LUE: See OT assessment- PT/ OT coeval  Strength RLE  Strength RLE: Exception  Comment: Difficulty formally assessing LE strength due to cognition- AROM against gravity noted at knee and ankle  Strength LLE  Strength LLE: Exception  Comment: Difficulty formally assessing LE strength due to cognition- AROM against gravity noted at knee and ankle  Strength RUE  Comment: See OT assessment- PT/ OT coeval  Strength LUE  Comment: See OT assessment- PT/ OT coeval  Motor Control  Gross Motor?: Exceptions  Comments: Significant L side neglect noted  Sensation  Overall Sensation Status:  (unable to formally assess due to pt's cognition)  Bed mobility  Rolling to Left: Maximum assistance  Rolling to Right: Maximum assistance  Supine to Sit: Maximum assistance;2 Person assistance  Sit to Supine: Maximum assistance;2 Person assistance  Comment: Max verbal cueing required for initiation and sequencing with poor return demo; pt required CGA- Venita to sit EOB, pt requiring Venita to correct posterior and L trunk lean with fatigue.  R<>L rolling performed for pericare  Transfers  Sit to Stand: Maximum Assistance;2 Person Assistance  Stand to sit: Maximum Assistance;2 Person Assistance  Comment: Sit to stand attempted with bilateral UE assistance; pt unable to maintain standing balance despite maxAx2 due to posterior lean  Ambulation  Ambulation?: No (unable to attempt due to poor standing tolerance)  Stairs/Curb  Stairs?: No     Balance  Posture: Fair  Sitting - Static: Fair;+  Sitting - Dynamic: Fair;-  Standing - Static: Poor;-  Standing - Dynamic: Poor;-  Comments: standing balance assessed with bilateral hand held assistance        Plan   Plan  Times per week: 5x/week  Current Treatment Recommendations: Strengthening,Home Exercise Program,ROM,Safety Education & Laurence Agustin Training,Patient/Caregiver Education & Training,Functional Mobility Training,Transfer Training,Gait Training,Stair training  Safety Devices  Type of devices: Call light within reach,Bed alarm in place,Gait belt,Left in bed,Patient at risk for falls,Nurse notified  Restraints  Initially in place: No  AM-PAC Score     AM-PAC Inpatient Mobility without Stair Climbing Raw Score : 6 (03/15/22 1624)  AM-PAC Inpatient without Stair Climbing T-Scale Score : 26.48 (03/15/22 1624)  Mobility Inpatient CMS 0-100% Score: 92.18 (03/15/22 1624)  Mobility Inpatient without Stair CMS G-Code Modifier : CM (03/15/22 1624)       Goals  Short term goals  Time Frame for Short term goals: 14  Short term goal 1: Pt to perform bed mobility Venita  Short term goal 2: Pt to demonstrate functional transfers Venita  Short term goal 3: Pt to ambulate 10ft w/ RW modA  Short term goal 4: Demonstrate standing dynamic balance of fair - with bilateral UE support to decrease fall risk       Therapy Time   Individual Concurrent Group Co-treatment   Time In 1330         Time Out 1401         Minutes 31         Timed Code Treatment Minutes: 9 Minutes       Kristeen Holstein, PT

## 2022-03-15 NOTE — ED NOTES
Writer called pt's POA/cousin, Hilaria Noguera and updated her on POC.       Lani Lowery RN  03/14/22 2136

## 2022-03-15 NOTE — PROGRESS NOTES
[facility]  Office: [address], DO, Kathya Mccormick, DO, Valery Coelho, DO, Elberton Boxer Blood, DO, Araceli Urban MD, Sanjeev Moore MD, Ryan Wood MD, Chelsey Greene MD, Shiloh Perry MD, Jaycee Cast MD, Alber Guerin MD, Neena Sims, DO, Beth Palacios, DO, Emerita Barrientos MD,  Lindsey Giles, DO, Zulma Guallpa MD, Toney Barber MD, Obdulio Cedeño MD, Ulises Pozo DO, Mabel Hart MD, Daleen Lennox, MD, Wilian Shanks, [facility], [facility], [facility], Sherly Sandra, CNP, Tevin Orosco, CNS, Bill Combs, CNP, May Spain, CNP, Lavelle Valdez, CNP, Suha Rubio, CNP, Hildy Shone, [facility], Domenica Whitfield PA-C, Uzma Pozo, [facility], Dorinda Duran, [facility], Lindsey Montero, CNP, Grace Xie, CNP, Naheed Wynn, CNP, Marquis Verde, CNP, Abundio Lindsey, [facility], Maggy Sharma, [address] [place] [address]    Progress Note    3/15/2022    5:34 PM    Name:   Yudelka Sierra  MRN:     [id number]     Acct:      [de-identified]   Room:   [address] [place] [address] Day:  1  Admit Date:  3/14/2022 10:00 PM    PCP:   Estrella Wei PA-C  Code Status:  Full Code    Subjective:     C/C: No chief complaint on file. Interval History Status: not changed. Patient's mentation appears to be at baseline. Sodium this morning was elevated. Patient is able to follow commands not a good historian. Denies any complaints. Blood pressure stable. Creatinine is improving. Brief History:     80-year-old female presented to the hospital for evaluation of hypotension. Patient was found to have an GLORIA in addition to significant hyponatremia with sodium of 158. Patient was managed with IV fluids. Infectious work-up was done, last consistent with UTI. Start antibiotics. Review of Systems:     Patient reports starting, unable to answer questions but denies all complaints  Medications:      Allergies:  No Known Allergies    Current Meds:   Scheduled Meds:    cefTRIAXone (ROCEPHIN) IV  1,000 mg IntraVENous Q24H    aspirin  81 mg Oral Daily    [Held by provider] amLODIPine  10 mg Oral Daily    atorvastatin  40 mg Oral Daily    sennosides-docusate sodium  2 tablet Oral Daily    sodium chloride flush  5-40 mL IntraVENous 2 times per day    insulin lispro  0-12 Units SubCUTAneous TID WC    insulin lispro  0-6 Units SubCUTAneous Nightly     Continuous Infusions:    heparin (PORCINE) Infusion 14 Units/kg/hr (03/15/22 0818)    dextrose 50 mL/hr at 03/15/22 1339    sodium chloride      dextrose       PRN Meds: acetaminophen, sodium chloride flush, sodium chloride, ondansetron **OR** ondansetron, glucose, dextrose, glucagon (rDNA), dextrose    Data:     Past Medical History:   has a past medical history of Dementia ([address] Gout, Hyperlipidemia, Hypertension, and Type II or unspecified type diabetes mellitus without mention of complication, not stated as uncontrolled. Social History:   reports that she quit smoking about 41 years ago. Her smoking use included cigarettes. She quit after 20.00 years of use. She has never used smokeless tobacco.     Family History:   Family History   Problem Relation Age of Onset    High Blood Pressure Mother        Vitals:  /69   Pulse 70   Temp 97.3 °F (36.3 °C) (Axillary)   Resp 17   SpO2 99%   Temp (24hrs), Av °F (36.7 °C), Min:97.3 °F (36.3 °C), Max:98.7 °F (37.1 °C)    Recent Labs     22  2356 03/15/22  0714 03/15/22  1204 03/15/22  1520   POCGLU 135* 88 98 87       I/O (24Hr):     Intake/Output Summary (Last 24 hours) at 3/15/2022 1734  Last data filed at 3/15/2022 0800  Gross per 24 hour   Intake 25 ml   Output --   Net 25 ml       Labs:  Hematology:  Recent Labs     22  1401 03/15/22  0126 03/15/22  0644   WBC 9.4 9.6  --    RBC 4.78 3.99  --    HGB 15.2 12.3  --    HCT 46.0 39.2  --    MCV 96.1 98.2  --    MCH 31.8 30.8  --    MCHC 33.1 31.4  --    RDW 16.4* 16.7*  --     231  --    MPV 8.9 10.7 --    INR  --   --  1.2     Chemistry:  Recent Labs     03/14/22  1401 03/14/22  1554 03/14/22  2318 03/15/22  0239 03/15/22  0644 03/15/22  0855   *  --   --  153* 150*  --    K 4.8  --   --  4.0 4.0  --    *  --   --  115* 113*  --    CO2 21  --   --  24 20  --    GLUCOSE 165*  --   --  109* 99  --    BUN 48*  --   --  49* 49*  --    CREATININE 1.50*  --   --  1.22* 1.19*  --    MG  --   --   --  2.0  --   --    ANIONGAP 19*  --   --  14 17  --    LABGLOM 33*  --   --  42* 44*  --    GFRAA 40*  --   --  51* 53*  --    CALCIUM 10.1  --   --  9.1 9.1  --    PROBNP 11,047*  --  7,864*  --   --   --    TROPHS 233*   < > 172* 180*  --  159*   MYOGLOBIN  --   --  85* 77*  --  60*   LACTACIDWB  --   --   --  1.7  --   --     < > = values in this interval not displayed. Recent Labs     03/14/22  2238 03/14/22  2356 03/15/22  0714 03/15/22  1204 03/15/22  1520   POCGLU 69 135* 88 98 87     ABG:  Lab Results   Component Value Date    FIO2 NOT REPORTED 02/17/2022     Lab Results   Component Value Date/Time    SPECIAL NOT REPORTED 02/19/2022 08:21 AM     Lab Results   Component Value Date/Time    CULTURE KLEBSIELLA PNEUMONIAE 10 to 50,000 CFU/ML (A) 02/19/2022 08:21 AM       Radiology:  CT HEAD WO CONTRAST    Result Date: 3/14/2022  From the previously noted areas of subarachnoid hemorrhage only minimal subarachnoid hemorrhage is left within the right frontal convexity sulci. The other areas have resolved. Changes related to right frontal craniotomy with interval removal of right subdural drain with 12 mm chronic subdural collection within the right frontoparietal region with 3-4 mm leftward midline shift. . Subdural collection is unchanged in size however the acute blood products have resolved. No evidence for acute intraparenchymal hemorrhage, territorial infarction or intracranial mass lesion. Moderate chronic microangiopathic ischemic disease.   Unchanged foci of chronic lacunar infarction Mild generalized volume loss. RECOMMENDATIONS: Unavailable     XR CHEST PORTABLE    Result Date: 3/14/2022  No acute cardiopulmonary process. Physical Examination:        General appearance:  alert, cooperative and no distress  Mental Status: She is not oriented to person, place or time. Incision noted over previous bur hole  Lungs:  clear to auscultation bilaterally, normal effort  Heart:  regular rate and rhythm, no murmur  Abdomen:  soft, nontender, nondistended, normal bowel sounds, no masses, hepatomegaly, splenomegaly  Extremities:  no edema, redness, tenderness in the calves  Skin:  no gross lesions, rashes, induration    Assessment:        Hospital Problems           Last Modified POA    * (Principal) NSTEMI (non-ST elevated myocardial infarction) ([address] 3/15/2022 Yes    Hypotension due to hypovolemia 3/15/2022 Yes    Hypernatremia 3/15/2022 Yes    Acute kidney injury ([address] 3/15/2022 Yes    Type 2 diabetes mellitus without complication, without long-term current use of insulin ([address] 3/15/2022 Yes    Subdural hematoma ([address] 3/15/2022 Yes    S/P evacuation of subdural hematoma 3/15/2022 Yes    Essential hypertension 3/15/2022 Yes    Acute dehydration 3/15/2022 Yes    Dysphagia 3/15/2022 Yes          Plan:         Elevated troponin: Likely MI type II due to demand ischemia from volume depletion, acute kidney injury however cannot rule out NSTEMI. Troponin is downtrending. Is on a heparin gtt, will continue for now. ECG does show T wave inversions and poor R wave progression. Cardiology consulted, check echocardiogram.   History of subdural hematoma requiring bur hole previously: Neurosurgery okay to continue heparin infusion without bolus, No intervention at this time. Recommend CT head in two days or once heparin is stopped. Dysphagia: check barium swallow  Hypernatremia: Free water deficit 2000 cc. Will start D5 at 50 cc/hr. Monitor sodium. Encourage oral intake.    Acute Kidney injury: baseline 0.6, was 1.5 on admission now 1.1. Likely pre-renal etiology due to hypovolemia from decreased oral intake and hypotension. Hold antihypertensives including Losartan, HCTZ. Monitor I/O's. Urinary Tract infection: UA shows positive nitrates, large leuk esterase, many bacteria. Previous cultures positive for klebsiella sensitive to Rocephin. Will start Rocephin now given lactic acid elevation, hypertension, and patient's inability to dissipate in review of systems. Estevan Garcia HAGMA: 2/2 zoila lactic acidosis. Continue IVF.    Primary Hypertension: Hold home medications given hypotension  Non-insulin-dependent diabetes mellitus type 2 with hyperglycemia: Hold home meds, start ISS goal blood glucose 140-180  Dyslipidemia: continue statin  DVT ppx  [address], DO  3/15/2022  5:34 PM

## 2022-03-16 ENCOUNTER — APPOINTMENT (OUTPATIENT)
Dept: CT IMAGING | Age: 82
DRG: 280 | End: 2022-03-16
Attending: FAMILY MEDICINE
Payer: MEDICARE

## 2022-03-16 PROBLEM — E87.6 ACUTE HYPOKALEMIA: Status: ACTIVE | Noted: 2022-03-16

## 2022-03-16 PROBLEM — N30.00 ACUTE CYSTITIS: Status: ACTIVE | Noted: 2022-03-16

## 2022-03-16 LAB
ABSOLUTE EOS #: 0.32 K/UL (ref 0–0.44)
ABSOLUTE IMMATURE GRANULOCYTE: 0.03 K/UL (ref 0–0.3)
ABSOLUTE LYMPH #: 2.4 K/UL (ref 1.1–3.7)
ABSOLUTE MONO #: 0.72 K/UL (ref 0.1–1.2)
ALBUMIN SERPL-MCNC: 3.5 G/DL (ref 3.5–5.2)
ANION GAP SERPL CALCULATED.3IONS-SCNC: 13 MMOL/L (ref 9–17)
ANION GAP SERPL CALCULATED.3IONS-SCNC: 16 MMOL/L (ref 9–17)
BASOPHILS # BLD: 1 % (ref 0–2)
BASOPHILS ABSOLUTE: 0.06 K/UL (ref 0–0.2)
BUN BLDV-MCNC: 23 MG/DL (ref 8–23)
BUN BLDV-MCNC: 33 MG/DL (ref 8–23)
CALCIUM SERPL-MCNC: 9 MG/DL (ref 8.6–10.4)
CALCIUM SERPL-MCNC: 9 MG/DL (ref 8.6–10.4)
CHLORIDE BLD-SCNC: 105 MMOL/L (ref 98–107)
CHLORIDE BLD-SCNC: 111 MMOL/L (ref 98–107)
CO2: 23 MMOL/L (ref 20–31)
CO2: 23 MMOL/L (ref 20–31)
CREAT SERPL-MCNC: 0.74 MG/DL (ref 0.5–0.9)
CREAT SERPL-MCNC: 0.85 MG/DL (ref 0.5–0.9)
CULTURE: ABNORMAL
CULTURE: NORMAL
CULTURE: NORMAL
EOSINOPHILS RELATIVE PERCENT: 4 % (ref 1–4)
GFR AFRICAN AMERICAN: >60 ML/MIN
GFR AFRICAN AMERICAN: >60 ML/MIN
GFR NON-AFRICAN AMERICAN: >60 ML/MIN
GFR NON-AFRICAN AMERICAN: >60 ML/MIN
GFR SERPL CREATININE-BSD FRML MDRD: ABNORMAL ML/MIN/{1.73_M2}
GFR SERPL CREATININE-BSD FRML MDRD: ABNORMAL ML/MIN/{1.73_M2}
GLUCOSE BLD-MCNC: 114 MG/DL (ref 70–99)
GLUCOSE BLD-MCNC: 122 MG/DL (ref 70–99)
GLUCOSE BLD-MCNC: 129 MG/DL (ref 65–105)
GLUCOSE BLD-MCNC: 130 MG/DL (ref 65–105)
GLUCOSE BLD-MCNC: 92 MG/DL (ref 65–105)
GLUCOSE BLD-MCNC: 98 MG/DL (ref 65–105)
HCT VFR BLD CALC: 37.6 % (ref 36.3–47.1)
HEMOGLOBIN: 11.8 G/DL (ref 11.9–15.1)
IMMATURE GRANULOCYTES: 0 %
LYMPHOCYTES # BLD: 28 % (ref 24–43)
MCH RBC QN AUTO: 31 PG (ref 25.2–33.5)
MCHC RBC AUTO-ENTMCNC: 31.4 G/DL (ref 28.4–34.8)
MCV RBC AUTO: 98.7 FL (ref 82.6–102.9)
MONOCYTES # BLD: 8 % (ref 3–12)
NRBC AUTOMATED: 0 PER 100 WBC
PARTIAL THROMBOPLASTIN TIME: 67.2 SEC (ref 20.5–30.5)
PDW BLD-RTO: 16.5 % (ref 11.8–14.4)
PHOSPHORUS: 2.7 MG/DL (ref 2.6–4.5)
PLATELET # BLD: 218 K/UL (ref 138–453)
PMV BLD AUTO: 10.7 FL (ref 8.1–13.5)
POTASSIUM SERPL-SCNC: 3.3 MMOL/L (ref 3.7–5.3)
POTASSIUM SERPL-SCNC: 4.1 MMOL/L (ref 3.7–5.3)
RBC # BLD: 3.81 M/UL (ref 3.95–5.11)
RBC # BLD: ABNORMAL 10*6/UL
REASON FOR REJECTION: NORMAL
SEG NEUTROPHILS: 59 % (ref 36–65)
SEGMENTED NEUTROPHILS ABSOLUTE COUNT: 5.12 K/UL (ref 1.5–8.1)
SODIUM BLD-SCNC: 144 MMOL/L (ref 135–144)
SODIUM BLD-SCNC: 147 MMOL/L (ref 135–144)
SPECIMEN DESCRIPTION: ABNORMAL
SPECIMEN DESCRIPTION: NORMAL
SPECIMEN DESCRIPTION: NORMAL
WBC # BLD: 8.7 K/UL (ref 3.5–11.3)
ZZ NTE CLEAN UP: ORDERED TEST: NORMAL
ZZ NTE WITH NAME CLEAN UP: SPECIMEN SOURCE: NORMAL

## 2022-03-16 PROCEDURE — 6370000000 HC RX 637 (ALT 250 FOR IP): Performed by: NURSE PRACTITIONER

## 2022-03-16 PROCEDURE — 80069 RENAL FUNCTION PANEL: CPT

## 2022-03-16 PROCEDURE — 94761 N-INVAS EAR/PLS OXIMETRY MLT: CPT

## 2022-03-16 PROCEDURE — 6370000000 HC RX 637 (ALT 250 FOR IP): Performed by: INTERNAL MEDICINE

## 2022-03-16 PROCEDURE — 70450 CT HEAD/BRAIN W/O DYE: CPT

## 2022-03-16 PROCEDURE — 6360000002 HC RX W HCPCS: Performed by: NURSE PRACTITIONER

## 2022-03-16 PROCEDURE — 85730 THROMBOPLASTIN TIME PARTIAL: CPT

## 2022-03-16 PROCEDURE — 85025 COMPLETE CBC W/AUTO DIFF WBC: CPT

## 2022-03-16 PROCEDURE — 2580000003 HC RX 258: Performed by: INTERNAL MEDICINE

## 2022-03-16 PROCEDURE — 2580000003 HC RX 258: Performed by: NURSE PRACTITIONER

## 2022-03-16 PROCEDURE — 36415 COLL VENOUS BLD VENIPUNCTURE: CPT

## 2022-03-16 PROCEDURE — 80048 BASIC METABOLIC PNL TOTAL CA: CPT

## 2022-03-16 PROCEDURE — 82947 ASSAY GLUCOSE BLOOD QUANT: CPT

## 2022-03-16 PROCEDURE — 97530 THERAPEUTIC ACTIVITIES: CPT

## 2022-03-16 PROCEDURE — 97535 SELF CARE MNGMENT TRAINING: CPT

## 2022-03-16 PROCEDURE — 6360000002 HC RX W HCPCS: Performed by: INTERNAL MEDICINE

## 2022-03-16 PROCEDURE — 97110 THERAPEUTIC EXERCISES: CPT

## 2022-03-16 PROCEDURE — 2060000000 HC ICU INTERMEDIATE R&B

## 2022-03-16 PROCEDURE — 99232 SBSQ HOSP IP/OBS MODERATE 35: CPT | Performed by: INTERNAL MEDICINE

## 2022-03-16 RX ORDER — METOPROLOL SUCCINATE 25 MG/1
12.5 TABLET, EXTENDED RELEASE ORAL NIGHTLY
Status: DISCONTINUED | OUTPATIENT
Start: 2022-03-16 | End: 2022-03-20 | Stop reason: HOSPADM

## 2022-03-16 RX ORDER — DEXTROSE MONOHYDRATE 50 MG/ML
INJECTION, SOLUTION INTRAVENOUS CONTINUOUS
Status: ACTIVE | OUTPATIENT
Start: 2022-03-16 | End: 2022-03-17

## 2022-03-16 RX ADMIN — METOPROLOL SUCCINATE 12.5 MG: 25 TABLET, FILM COATED, EXTENDED RELEASE ORAL at 19:30

## 2022-03-16 RX ADMIN — DEXTROSE MONOHYDRATE: 50 INJECTION, SOLUTION INTRAVENOUS at 15:48

## 2022-03-16 RX ADMIN — ASPIRIN 81 MG: 81 TABLET, CHEWABLE ORAL at 09:47

## 2022-03-16 RX ADMIN — POTASSIUM BICARBONATE 40 MEQ: 782 TABLET, EFFERVESCENT ORAL at 15:48

## 2022-03-16 RX ADMIN — STANDARDIZED SENNA CONCENTRATE AND DOCUSATE SODIUM 2 TABLET: 8.6; 5 TABLET ORAL at 09:47

## 2022-03-16 RX ADMIN — DEXTROSE MONOHYDRATE 100 ML/HR: 50 INJECTION, SOLUTION INTRAVENOUS at 11:19

## 2022-03-16 RX ADMIN — DESMOPRESSIN ACETATE 40 MG: 0.2 TABLET ORAL at 09:48

## 2022-03-16 RX ADMIN — SODIUM CHLORIDE, PRESERVATIVE FREE 10 ML: 5 INJECTION INTRAVENOUS at 19:30

## 2022-03-16 RX ADMIN — HEPARIN SODIUM 10 UNITS/KG/HR: 5000 INJECTION INTRAVENOUS; SUBCUTANEOUS at 06:46

## 2022-03-16 RX ADMIN — CEFTRIAXONE SODIUM 1000 MG: 1 INJECTION, POWDER, FOR SOLUTION INTRAMUSCULAR; INTRAVENOUS at 11:19

## 2022-03-16 ASSESSMENT — PAIN SCALES - WONG BAKER: WONGBAKER_NUMERICALRESPONSE: 0

## 2022-03-16 ASSESSMENT — PAIN SCALES - GENERAL
PAINLEVEL_OUTOF10: 0
PAINLEVEL_OUTOF10: 0

## 2022-03-16 NOTE — PROGRESS NOTES
[facility]  Progress Note                   Date:   3/16/2022  Patient name: Deb Manzo  Date of admission:  3/14/2022 10:00 PM  MRN:   [id number]  YOB: 1940  PCP: Clint Pittman PA-C    Reason for Admission: NSTEMI (non-ST elevated myocardial infarction) ([facility] [address] [I21.4]    Subjective:       Clinical Changes /Abnormalities: Seen & examined in room after discussing with RN. No acute CV issues/concerns overnight. Labs, vitals,  & tele reviewed. Tele SR    Review of Systems    Medications:   Scheduled Meds:   cefTRIAXone (ROCEPHIN) IV  1,000 mg IntraVENous Q24H    aspirin  81 mg Oral Daily    [Held by provider] amLODIPine  10 mg Oral Daily    atorvastatin  40 mg Oral Daily    sennosides-docusate sodium  2 tablet Oral Daily    sodium chloride flush  5-40 mL IntraVENous 2 times per day    insulin lispro  0-12 Units SubCUTAneous TID WC    insulin lispro  0-6 Units SubCUTAneous Nightly     Continuous Infusions:   heparin (PORCINE) Infusion 10 Units/kg/hr (03/16/22 0646)    sodium chloride      dextrose 100 mL/hr (03/16/22 1119)     CBC:   Recent Labs     03/14/22  1401 03/15/22  0126 03/16/22  0420   WBC 9.4 9.6 8.7   HGB 15.2 12.3 11.8*    231 218     BMP:    Recent Labs     03/15/22  0239 03/15/22  0644 03/16/22  0420   * 150* 147*   K 4.0 4.0 3.3*   * 113* 111*   CO2 24 20 23   BUN 49* 49* 33*   CREATININE 1.22* 1.19* 0.85   GLUCOSE 109* 99 114*     Hepatic:No results for input(s): AST, ALT, ALB, BILITOT, ALKPHOS in the last 72 hours. Troponin:   Recent Labs     03/14/22  2318 03/15/22  0239 03/15/22  0855   TROPHS 172* 180* 159*     BNP: No results for input(s): BNP in the last 72 hours. Lipids: No results for input(s): CHOL, HDL in the last 72 hours.     Invalid input(s): LDLCALCU  INR:   Recent Labs     03/15/22  0644   INR 1.2       Objective:   Vitals: /62   Pulse 71   Temp 98 °F (36.7 °C) (Temporal)   Resp 17   SpO2 99%   General appearance: alert and cooperative with exam. Oriented to self only presently  HEENT: Head: Normocephalic, no lesions, without obvious abnormality. Neck:no JVD, trachea midline, no adenopathy  Lungs: Clear to auscultation, no distress  Heart: Regular rate and rhythm, s1/s2 auscultated, no murmurs, currently SR 66  Abdomen: soft, non-tender, bowel sounds active  Extremities: no edema  Neurologic: not done    ECHO 8/16/2021: EF 50-55%, mild TR, RVSP is 27 mmHg. Assessment / Acute Cardiac Problems:   1. GLORIA  2. Hyponatremia  3. Elevated troponin, likely type II MI/demand ischemia from hypotension/GLORIA/UTI  4. Recent SDH s/p evacuation  5. Dysphagia  6. UTI  7. DM2    Patient Active Problem List:     Essential hypertension     Type 2 diabetes mellitus without complication, without long-term current use of insulin ([facility] [address]     Mixed hyperlipidemia     Non morbid obesity due to excess calories     Progressive cognitive dysfunction     Altered mental status     Dementia with behavioral disturbance (HCC)     Dementia with behavioral problem (HCC)     Subdural hematoma (HCC)     S/P evacuation of subdural hematoma     NSTEMI (non-ST elevated myocardial infarction) ([facility] [address]     Acute dehydration     Hypotension due to hypovolemia     Hypernatremia     Acute kidney injury ([facility] [address]     Dysphagia      Plan of Treatment:   1. Stable from CV standpoint. No acute CV issues/concerns overnight. Recommend to continue IV Heparin gtt through this morning/afternoon then may discontinue. Continue PO ASA & statin. Add low dose BB @ HS  2. Echo pending (this can be done as OP if plans to return to facility today)  3. Will plan for ischemic work-up with cardiac cath likely as OP once acute issues resolve. F/U in clinic in 2 weeks after discharge.     Electronically signed by GRACIE Bal CNP on 3/16/2022 at 11:40 AM  [address]  [phone]

## 2022-03-16 NOTE — PROGRESS NOTES
[facility]  Office: [address], DO, Cherrie March, DO, Jose Dickens, DO, Goyo Lancaster Blood, DO, Arianna Ku MD, Anand Gallegos MD, Roselyn Sheets MD, Gabe Coulter MD, Elenita Silvestre MD, Stef Worthy MD, Tio Ivory MD, Sandra Damon, DO, Monique Jose, DO, Kash Marmolejo MD,  Eun Gonsalves, DO, Harriet Redd MD, Ronda Hoyt MD, Susie Veras MD, Mine Perry DO, Guadalupe Coates MD, Stefan Ferrara MD, Hayes Hewitt, [facility], [facility], CNP, Rafael Lima, CNP, [facility] Zhang, CNS, Lance Fernandez, CNP, Brittany Lindsay, CNP, Jaskaran Rodriguez, CNP, Gene Varela, CNP, Filiberto Lepe, CNP, Marc Muse PA-C, Mateo Mills [facility], Claudette Anis, [facility], David Kauffman, CNP, Quoc Fleming, CNP, Graeme Center, CNP, Kym Nayak, CNP, Viviane Vásquez, [facility], Riya Gear, [address] [place] [address]    Progress Note    3/16/2022    4:10 PM    Name:   Sherice Mallory  MRN:     [id number]     Acct:      [de-identified]   Room:   [address] [place] [address] Day:  2  Admit Date:  3/14/2022 10:00 PM    PCP:   Shaina Mckoy PA-C  Code Status:  Full Code    Subjective:     C/C: No chief complaint on file. Interval History Status: not changed. Patient's mentation appears to be at baseline. Sodium this morning did improve. Patient is able to follow commands not a good historian. Denies any complaints. Blood pressure stable. Brief History:     80-year-old female presented to the hospital for evaluation of hypotension. Patient was found to have an GLORIA in addition to significant hyponatremia with sodium of 158. Patient was managed with IV fluids. Infectious work-up was done, last consistent with UTI. Start antibiotics. Review of Systems:     Patient reports starting, unable to answer questions but denies all complaints  Medications:      Allergies:  No Known Allergies    Current Meds:   Scheduled Meds:    metoprolol succinate  12.5 mg Oral Nightly    cefTRIAXone (ROCEPHIN) IV  1,000 mg IntraVENous Q24H    aspirin  81 mg Oral Daily    atorvastatin  40 mg Oral Daily    sennosides-docusate sodium  2 tablet Oral Daily    sodium chloride flush  5-40 mL IntraVENous 2 times per day    insulin lispro  0-12 Units SubCUTAneous TID WC    insulin lispro  0-6 Units SubCUTAneous Nightly     Continuous Infusions:    dextrose 50 mL/hr at 22 1548    sodium chloride      dextrose 100 mL/hr (22 1119)     PRN Meds: acetaminophen, sodium chloride flush, sodium chloride, ondansetron **OR** ondansetron, glucose, dextrose, glucagon (rDNA), dextrose    Data:     Past Medical History:   has a past medical history of Dementia ([address] Gout, Hyperlipidemia, Hypertension, and Type II or unspecified type diabetes mellitus without mention of complication, not stated as uncontrolled. Social History:   reports that she quit smoking about 41 years ago. Her smoking use included cigarettes. She quit after 20.00 years of use. She has never used smokeless tobacco.     Family History:   Family History   Problem Relation Age of Onset    High Blood Pressure Mother        Vitals:  /70   Pulse 74   Temp 96.9 °F (36.1 °C) (Axillary)   Resp 16   SpO2 99%   Temp (24hrs), Av.1 °F (36.7 °C), Min:96.9 °F (36.1 °C), Max:98.7 °F (37.1 °C)    Recent Labs     03/15/22  2042 22  0631 22  1138 22  1512   POCGLU 110* 98 129* 130*       I/O (24Hr):     Intake/Output Summary (Last 24 hours) at 3/16/2022 1610  Last data filed at 3/15/2022 2232  Gross per 24 hour   Intake --   Output 100 ml   Net -100 ml       Labs:  Hematology:  Recent Labs     22  1401 03/15/22  0126 03/15/22  0644 22  0420   WBC 9.4 9.6  --  8.7   RBC 4.78 3.99  --  3.81*   HGB 15.2 12.3  --  11.8*   HCT 46.0 39.2  --  37.6   MCV 96.1 98.2  --  98.7   MCH 31.8 30.8  --  31.0   MCHC 33.1 31.4  --  31.4   RDW 16.4* 16.7*  --  16.5*    231  --  218   MPV 8.9 10.7 --  10.7   INR  --   --  1.2  --      Chemistry:  Recent Labs     03/14/22  1401 03/14/22  1401 03/14/22  1554 03/14/22  2318 03/15/22  0239 03/15/22  0644 03/15/22  0855 03/16/22  0420   *   < >  --   --  153* 150*  --  147*   K 4.8   < >  --   --  4.0 4.0  --  3.3*   *   < >  --   --  115* 113*  --  111*   CO2 21   < >  --   --  24 20  --  23   GLUCOSE 165*   < >  --   --  109* 99  --  114*   BUN 48*   < >  --   --  49* 49*  --  33*   CREATININE 1.50*   < >  --   --  1.22* 1.19*  --  0.85   MG  --   --   --   --  2.0  --   --   --    ANIONGAP 19*   < >  --   --  14 17  --  13   LABGLOM 33*   < >  --   --  42* 44*  --  >60   GFRAA 40*   < >  --   --  51* 53*  --  >60   CALCIUM 10.1   < >  --   --  9.1 9.1  --  9.0   PHOS  --   --   --   --   --   --   --  2.7   PROBNP 11,047*  --   --  3,495*  --   --   --   --    TROPHS 233*  --    < > 172* 180*  --  159*  --    MYOGLOBIN  --   --   --  85* 77*  --  60*  --    LACTACIDWB  --   --   --   --  1.7  --   --   --     < > = values in this interval not displayed. Recent Labs     03/15/22  1204 03/15/22  1520 03/15/22  2042 03/16/22  0420 03/16/22  0631 03/16/22  1138 03/16/22  1512   LABALBU  --   --   --  3.5  --   --   --    POCGLU 98 87 110*  --  98 129* 130*     ABG:  Lab Results   Component Value Date    FIO2 NOT REPORTED 02/17/2022     Lab Results   Component Value Date/Time    SPECIAL NOT REPORTED 02/19/2022 08:21 AM     Lab Results   Component Value Date/Time    CULTURE Unable to perform testing: No specimen received. 03/15/2022 02:14 PM       Radiology:  CT HEAD WO CONTRAST    Result Date: 3/14/2022  From the previously noted areas of subarachnoid hemorrhage only minimal subarachnoid hemorrhage is left within the right frontal convexity sulci. The other areas have resolved.  Changes related to right frontal craniotomy with interval removal of right subdural drain with 12 mm chronic subdural collection within the right frontoparietal region with 3-4 mm leftward midline shift. . Subdural collection is unchanged in size however the acute blood products have resolved. No evidence for acute intraparenchymal hemorrhage, territorial infarction or intracranial mass lesion. Moderate chronic microangiopathic ischemic disease. Unchanged foci of chronic lacunar infarction Mild generalized volume loss. RECOMMENDATIONS: Unavailable     XR CHEST PORTABLE    Result Date: 3/14/2022  No acute cardiopulmonary process. Physical Examination:        General appearance:  alert, cooperative and chronically ill appearing  Mental Status: She is not oriented to person, place or time. Incision noted over previous bur hole  Lungs:  clear to auscultation bilaterally, normal effort  Heart:  regular rate and rhythm, no murmur  Abdomen:  soft, nontender, nondistended, normal bowel sounds, no masses, hepatomegaly, splenomegaly  Extremities:  no edema, redness, tenderness in the calves  Skin:  no gross lesions, rashes, induration    Assessment:        Hospital Problems           Last Modified POA    * (Principal) NSTEMI (non-ST elevated myocardial infarction) ([address] 3/16/2022 Yes    Hypotension due to hypovolemia 3/15/2022 Yes    Hypernatremia 3/15/2022 Yes    Acute kidney injury ([address] 3/15/2022 Yes    Acute cystitis 3/16/2022 Yes    Type 2 diabetes mellitus without complication, without long-term current use of insulin ([address] 3/15/2022 Yes    Subdural hematoma ([address] 3/15/2022 Yes    S/P evacuation of subdural hematoma 3/15/2022 Yes    Essential hypertension 3/15/2022 Yes    Acute dehydration 3/15/2022 Yes    Dysphagia 3/15/2022 Yes    Acute hypokalemia 3/16/2022 No          Plan:         Hypernatremia: Free water deficit 2000 cc. Will start D5 at 50 cc/hr. Monitor sodium. Encourage oral intake. NSTEMI:Troponin is stable. Completed heparin gtt for 48 hours. Spoke with Cardiology, okay to get echo outpatient. No further ischemic workup until neurologic issues resolve.  Family agreeable. Acute Kidney injury: baseline 0.6, was 1.5 on admission now 1.1. Likely pre-renal etiology due to hypovolemia from decreased oral intake and hypotension. Hold antihypertensives including Losartan, HCTZ. Monitor I/O's. Acute Hypokalemia: replace potassium   Urinary Tract infection: UA shows positive nitrates, large leuk esterase, many bacteria. Previous cultures positive for klebsiella sensitive to Rocephin. Continue  Rocephin now given lactic acid elevation, hypertension, and patient's inability to dissipate in review of systems. History of subdural hematoma requiring bur hole previously: Neurosurgery okay to continue heparin infusion without bolus, No intervention at this time. Repeat CT unchanged. HAGMA: resolved   Primary Hypertension: Hold home medications given hypotension  Non-insulin-dependent diabetes mellitus type 2 with hyperglycemia: Hold home meds, start ISS goal blood glucose 140-180  Dyslipidemia: continue statin  DVT ppx  PTOT    Dispo: Still needs pre-cert.    Nohemi Groves DO  3/16/2022  4:10 PM

## 2022-03-16 NOTE — PROGRESS NOTES
Occupational Therapy  Facility/Department: [facility] 4B STEPDOWN  Daily Treatment Note  NAME: Ken Nieves  : 1940  MRN: [id number]    Date of Service: 3/16/2022    Discharge Recommendations:Pt. Would benefit from further skilled OT services to enhance functional outcomes. Patient would benefit from continued therapy after discharge  OT Equipment Recommendations  Equipment Needed: Yes  ADL Assistive Devices: Toileting - Drop Arm Commode, Heavy Duty Drop Arm Commode;Transfer Tub Bench;Grab Bars - toilet;Grab Bars - shower;Gait Belt    Assessment   Performance deficits / Impairments: Decreased functional mobility ; Decreased endurance;Decreased high-level IADLs;Decreased ADL status; Decreased safe awareness;Decreased balance;Decreased strength;Decreased ROM; Decreased fine motor control;Decreased coordination;Decreased posture;Decreased vision/visual deficit; Decreased cognition  Prognosis: Fair  Decision Making: Medium Complexity  Patient Education: OT role, OT POC, purpose of tx, hand placement for transfers, activity promotion, reorientation, weight shifting, balance maintance - fair/poor return  REQUIRES OT FOLLOW UP: Yes  Activity Tolerance  Activity Tolerance: Treatment limited secondary to decreased cognition;Patient Tolerated treatment well  Safety Devices  Safety Devices in place: Yes  Type of devices: Gait belt;Call light within reach;Nurse notified; Left in bed;Bed alarm in place; Patient at risk for falls  Restraints  Initially in place: No         Patient Diagnosis(es): There were no encounter diagnoses. has a past medical history of Dementia ([facility] [address] Gout, Hyperlipidemia, Hypertension, and Type II or unspecified type diabetes mellitus without mention of complication, not stated as uncontrolled. has a past surgical history that includes [facility] (Right, 2022) and craniotomy (N/A, 2022).     Restrictions  Restrictions/Precautions  Restrictions/Precautions: Fall Risk  Required Braces or Orthoses?: No  Position Activity Restriction  Other position/activity restrictions: up w/assist  Subjective   General  Patient assessed for rehabilitation services?: Yes  Family / Caregiver Present: No  General Comment  Comments: RN ok'd patient for OT tx. Pt pleasant and cooperative throughout, agreeable to tx. Vital Signs  Patient Currently in Pain: Denies   Orientation  Orientation  Overall Orientation Status: Impaired  Orientation Level: Disoriented to place;Oriented to person;Disoriented to situation;Disoriented to time  Objective    ADL  Grooming: Moderate assistance; Increased time to complete;Setup;Verbal cueing (Pt. needing extended time x3, sitting EOB, CGA for action, mod A for set up and initiation, Min A to maintain balance (brush teeth/wash face))        Balance  Sitting Balance: Minimal assistance (Static/dynamic, min A d/t posterior and R lateral lean. A needed for L hand positioning at times to assist with balance. Sat EOB ~30 minutes)  Standing Balance: Maximum assistance (x2)  Standing Balance  Time: ~1.5 minutes with 1 sitting rest break  Activity: static EOB  Comment: poor posture, max  A x 2 + RW. Flexed at neck and trunk. A for L hand placement on RW. Functional Mobility  Functional Mobility Comments: AVA d/t P standing balance. Bed mobility  Supine to Sit: Maximum assistance;2 Person assistance  Sit to Supine: Maximum assistance;2 Person assistance  Comment: Verbal cues for tech, P carryover d/t cognition. Transfers  Sit to stand: 2 Person assistance;Maximum assistance  Stand to sit: Maximum assistance;2 Person assistance  Transfer Comments: EOB<->stand 2 times. Cognition  Overall Cognitive Status: Exceptions  Arousal/Alertness: Inconsistent responses to stimuli;Delayed responses to stimuli  Following Commands: Follows one step commands with repetition; Inconsistently follows commands; Follows one step commands with increased time  Attention Span: Difficulty attending to directions; Attends with cues to redirect  Memory: Decreased recall of biographical Information;Decreased recall of recent events;Decreased short term memory;Decreased long term memory  Safety Judgement: Decreased awareness of need for assistance;Decreased awareness of need for safety  Problem Solving: Decreased awareness of errors;Assistance required to correct errors made;Assistance required to identify errors made  Insights: Not aware of deficits  Initiation: Requires cues for all  Sequencing: Requires cues for all  Cognition Comment: Patient responds to \"yes\"/\"no\" questions with increased time                                         Plan   Plan  Times per week: 3-4x/wk  Current Treatment Recommendations: Strengthening,Endurance Training,Patient/Caregiver Education & Training,Equipment Evaluation, Education, & procurement,Self-Care / ADL,Home Management Training,Safety Education & Training,Functional Mobility Training,Balance Training,Positioning                                               AM-PAC Score        AM-PAC Inpatient Daily Activity Raw Score: 12 (03/16/22 1234)  AM-PAC Inpatient ADL T-Scale Score : 30.6 (03/16/22 1234)  ADL Inpatient CMS 0-100% Score: 66.57 (03/16/22 1234)  ADL Inpatient CMS G-Code Modifier : CL (03/16/22 1234)    Goals  Short term goals  Time Frame for Short term goals: Patient will, by discharge  Short term goal 1: demo self-feeding/grooming at A using AE PRN  Short term goal 2: [facility] term goal 3: demo 12+ min of dynamic sitting balance at A to engage in functional tasks  Short term goal 4: demo functional transfers at [facility] A to engage in ADLs  Short term goal 5: demo bed mobility at [facility] A to promote OOB activities and participate in pressure relief  Short term goal 6: participate in [facility] activity with >90% accuracy to improve self-feeding and grooming task performance       Therapy Time   Individual Concurrent Group Co-treatment   Time In 1055         Time Out 1139         Minutes 44         Timed Code Treatment Minutes: 45 Minutes, co. tx with PTA       NINA Frey/LAYNE

## 2022-03-16 NOTE — PROGRESS NOTES
Physical Therapy  Facility/Department: [facility] 4B STEPDOWN  Daily Treatment Note  NAME: Charli Arzola  : 1940  MRN: [id number]    Date of Service: 3/16/2022    Discharge Recommendations:  Patient would benefit from continued therapy after discharge   PT Equipment Recommendations  Equipment Needed: No    Assessment   Body structures, Functions, Activity limitations: Decreased functional mobility ; Decreased balance;Decreased ROM; Decreased strength;Decreased safe awareness;Decreased cognition;Decreased endurance;Decreased coordination;Decreased vision/visual deficit  Assessment: Pt required maxAx2 to perform bed mobility and attempt functional transfers this date, pt unable to maintain standing balance despite maximum assistance. Pt is a high fall risk and would be unsafe to perform functional mobility unassisted. Recommending continued skilled physical therapy to address functional mobility deficits to return pt to prior baseline function. Prognosis: Fair  REQUIRES PT FOLLOW UP: Yes  Activity Tolerance  Activity Tolerance: Patient limited by cognitive status     Patient Diagnosis(es): There were no encounter diagnoses. has a past medical history of Dementia ([facility] [address] Gout, Hyperlipidemia, Hypertension, and Type II or unspecified type diabetes mellitus without mention of complication, not stated as uncontrolled. has a past surgical history that includes [facility] (Right, 2022) and craniotomy (N/A, 2022). Restrictions  Restrictions/Precautions  Restrictions/Precautions: Fall Risk  Required Braces or Orthoses?: No  Position Activity Restriction  Other position/activity restrictions: up w/assist  Subjective   General  Response To Previous Treatment: Patient with no complaints from previous session.   Family / Caregiver Present: No  Subjective  Subjective: Pt resting in bed upon arrival, pleasantly confused throughout  Pain Screening  Patient Currently in Pain: Denies  Vital Signs  Patient Currently in Pain: Denies       Orientation  Orientation  Overall Orientation Status: Impaired  Orientation Level: Oriented to person;Disoriented to place; Disoriented to time;Disoriented to situation  Cognition      Objective   Bed mobility  Rolling to Right: Maximum assistance  Supine to Sit: Maximum assistance;2 Person assistance  Sit to Supine: Maximum assistance;2 Person assistance  Scooting: Maximal assistance  Transfers  Sit to Stand: Maximum Assistance;2 Person Assistance  Stand to sit: Maximum Assistance;2 Person Assistance  Comment: sit to stand x 2 trials with RW and MAX A x2  Ambulation  Ambulation?: No (unable to advance LE's)     Balance  Posture: Fair  Sitting - Static: Fair;+  Sitting - Dynamic: Fair  Standing - Static: Poor  Standing - Dynamic: Poor;-  Comments: Pt stood with RW 2 trials ~30 sec each, MAX Ax2 due to poor posture and heavy post lean    Exercise  Supine B LE PROM in all planes x 10   AM-PAC Score  AM-PAC Inpatient Mobility Raw Score : 9 (03/16/22 1345)  AM-PAC Inpatient T-Scale Score : 30.55 (03/16/22 1345)  Mobility Inpatient CMS 0-100% Score: 81.38 (03/16/22 1345)  Mobility Inpatient CMS G-Code Modifier : CM (03/16/22 1345)          Goals  Short term goals  Time Frame for Short term goals: 14  Short term goal 1: Pt to perform bed mobility Venita  Short term goal 2: Pt to demonstrate functional transfers Venita  Short term goal 3: Pt to ambulate 10ft w/ RW modA  Short term goal 4: Demonstrate standing dynamic balance of fair - with bilateral UE support to decrease fall risk    Plan    Plan  Times per week: 5x/week  Current Treatment Recommendations: Strengthening,Home Exercise Program,ROM,Safety Education & Training,Balance Training,Endurance Training,Patient/Caregiver Education & Training,Functional Mobility Training,Transfer Training,Gait Training,Stair training  Safety Devices  Type of devices: Call light within reach,Bed alarm in place,Gait belt,Left in bed,Patient at risk for falls,Nurse notified  Restraints  Initially in place: No     Therapy Time   Individual Concurrent Group Co-treatment   Time In 1048         Time Out 1119         Minutes 31         Timed Code Treatment Minutes: 213 [facility]

## 2022-03-16 NOTE — PLAN OF CARE
Problem: Skin Integrity:  Goal: Will show no infection signs and symptoms  Description: Will show no infection signs and symptoms  Outcome: Ongoing  Goal: Absence of new skin breakdown  Description: Absence of new skin breakdown  Outcome: Ongoing     Problem: Falls - Risk of:  Goal: Will remain free from falls  Description: Will remain free from falls  Outcome: Ongoing  Goal: Absence of physical injury  Description: Absence of physical injury  Outcome: Ongoing   Care Plan in progress

## 2022-03-16 NOTE — CARE COORDINATION
Transitional planning:  Nurse rounds: Pt may be able to return to [facility]. 1130 Dr. Gwendolyn Villagran here, pt on Heparin drip and IV antibiotic. Will stop Heparin drip between noon and 3 pm today. Then Repeat CT scan to make sure pt is stable, then discharge. [address] at [facility] and she will start precert today. She states that pt needs authorization before return because pt's insurance was stopped, right at her discharge. Kevin and spoke with [facility]. Set up transport on Will call for tomorrow to [facility] in [facility]. [address] [facility] [place], medical necessity and transportation forms to [facility].

## 2022-03-17 PROBLEM — G93.41 METABOLIC ENCEPHALOPATHY: Status: ACTIVE | Noted: 2022-03-17

## 2022-03-17 LAB
ALBUMIN SERPL-MCNC: 3.2 G/DL (ref 3.5–5.2)
ANION GAP SERPL CALCULATED.3IONS-SCNC: 15 MMOL/L (ref 9–17)
BUN BLDV-MCNC: 18 MG/DL (ref 8–23)
CALCIUM SERPL-MCNC: 8.6 MG/DL (ref 8.6–10.4)
CHLORIDE BLD-SCNC: 105 MMOL/L (ref 98–107)
CO2: 21 MMOL/L (ref 20–31)
CREAT SERPL-MCNC: 0.64 MG/DL (ref 0.5–0.9)
GFR AFRICAN AMERICAN: >60 ML/MIN
GFR NON-AFRICAN AMERICAN: >60 ML/MIN
GFR SERPL CREATININE-BSD FRML MDRD: ABNORMAL ML/MIN/{1.73_M2}
GLUCOSE BLD-MCNC: 107 MG/DL (ref 65–105)
GLUCOSE BLD-MCNC: 115 MG/DL (ref 65–105)
GLUCOSE BLD-MCNC: 117 MG/DL (ref 70–99)
GLUCOSE BLD-MCNC: 120 MG/DL (ref 65–105)
LV EF: 59 %
LVEF MODALITY: NORMAL
PHOSPHORUS: 2.3 MG/DL (ref 2.6–4.5)
POTASSIUM SERPL-SCNC: 3.7 MMOL/L (ref 3.7–5.3)
SODIUM BLD-SCNC: 141 MMOL/L (ref 135–144)

## 2022-03-17 PROCEDURE — 6370000000 HC RX 637 (ALT 250 FOR IP): Performed by: INTERNAL MEDICINE

## 2022-03-17 PROCEDURE — 36415 COLL VENOUS BLD VENIPUNCTURE: CPT

## 2022-03-17 PROCEDURE — 6370000000 HC RX 637 (ALT 250 FOR IP): Performed by: NURSE PRACTITIONER

## 2022-03-17 PROCEDURE — 99232 SBSQ HOSP IP/OBS MODERATE 35: CPT | Performed by: INTERNAL MEDICINE

## 2022-03-17 PROCEDURE — 82947 ASSAY GLUCOSE BLOOD QUANT: CPT

## 2022-03-17 PROCEDURE — 97110 THERAPEUTIC EXERCISES: CPT

## 2022-03-17 PROCEDURE — 97530 THERAPEUTIC ACTIVITIES: CPT

## 2022-03-17 PROCEDURE — 2060000000 HC ICU INTERMEDIATE R&B

## 2022-03-17 PROCEDURE — 2580000003 HC RX 258: Performed by: INTERNAL MEDICINE

## 2022-03-17 PROCEDURE — 93306 TTE W/DOPPLER COMPLETE: CPT

## 2022-03-17 PROCEDURE — 94761 N-INVAS EAR/PLS OXIMETRY MLT: CPT

## 2022-03-17 PROCEDURE — 80069 RENAL FUNCTION PANEL: CPT

## 2022-03-17 PROCEDURE — 2580000003 HC RX 258: Performed by: NURSE PRACTITIONER

## 2022-03-17 RX ORDER — CEFUROXIME AXETIL 500 MG/1
500 TABLET ORAL EVERY 12 HOURS SCHEDULED
Qty: 8 TABLET | Refills: 0 | DISCHARGE
Start: 2022-03-17 | End: 2022-03-21

## 2022-03-17 RX ORDER — METOPROLOL SUCCINATE 25 MG/1
12.5 TABLET, EXTENDED RELEASE ORAL NIGHTLY
Qty: 30 TABLET | Refills: 3 | DISCHARGE
Start: 2022-03-17

## 2022-03-17 RX ORDER — CEFUROXIME AXETIL 500 MG/1
500 TABLET ORAL EVERY 12 HOURS SCHEDULED
Status: DISCONTINUED | OUTPATIENT
Start: 2022-03-17 | End: 2022-03-20 | Stop reason: HOSPADM

## 2022-03-17 RX ORDER — ASPIRIN 81 MG/1
81 TABLET, CHEWABLE ORAL DAILY
Qty: 30 TABLET | Refills: 3 | DISCHARGE
Start: 2022-03-18

## 2022-03-17 RX ADMIN — CEFUROXIME AXETIL 500 MG: 500 TABLET ORAL at 20:23

## 2022-03-17 RX ADMIN — METOPROLOL SUCCINATE 12.5 MG: 25 TABLET, FILM COATED, EXTENDED RELEASE ORAL at 20:23

## 2022-03-17 RX ADMIN — CEFUROXIME AXETIL 500 MG: 500 TABLET ORAL at 11:29

## 2022-03-17 RX ADMIN — DESMOPRESSIN ACETATE 40 MG: 0.2 TABLET ORAL at 08:46

## 2022-03-17 RX ADMIN — SODIUM CHLORIDE, PRESERVATIVE FREE 10 ML: 5 INJECTION INTRAVENOUS at 20:26

## 2022-03-17 RX ADMIN — SODIUM CHLORIDE, PRESERVATIVE FREE 20 ML: 5 INJECTION INTRAVENOUS at 09:26

## 2022-03-17 RX ADMIN — DEXTROSE MONOHYDRATE 50 ML/HR: 50 INJECTION, SOLUTION INTRAVENOUS at 01:28

## 2022-03-17 RX ADMIN — ASPIRIN 81 MG: 81 TABLET, CHEWABLE ORAL at 08:46

## 2022-03-17 ASSESSMENT — PAIN SCALES - WONG BAKER
WONGBAKER_NUMERICALRESPONSE: 0

## 2022-03-17 ASSESSMENT — PAIN SCALES - GENERAL
PAINLEVEL_OUTOF10: 0
PAINLEVEL_OUTOF10: 0

## 2022-03-17 NOTE — PLAN OF CARE
Nutrition Problem #1: Predicted inadequate energy intake  Intervention: Food and/or Nutrient Delivery: Modify Current Diet,Start Oral Nutrition Supplement  Nutritional Goals: Meet greater than 50% of estimated nutrition needs

## 2022-03-17 NOTE — PROGRESS NOTES
Physical Therapy  Facility/Department: [facility] 4B STEPDOWN  Daily Treatment Note  NAME: Christel Concepcion  : 1940  MRN: [id number]    Date of Service: 3/17/2022    Discharge Recommendations:  Patient would benefit from continued therapy after discharge   PT Equipment Recommendations  Equipment Needed: No    Assessment   Body structures, Functions, Activity limitations: Decreased functional mobility ; Decreased balance;Decreased ROM; Decreased strength;Decreased safe awareness;Decreased cognition;Decreased endurance;Decreased coordination;Decreased vision/visual deficit  Assessment: Pt required maxA to perform bed mobility and attempt functional transfers this date, pt unable to maintain standing balance despite maximum assistance. Pt is a high fall risk and would be unsafe to perform functional mobility unassisted. Recommending continued skilled physical therapy to address functional mobility deficits to return pt to prior baseline function. Prognosis: Fair  REQUIRES PT FOLLOW UP: Yes  Activity Tolerance  Activity Tolerance: Patient limited by cognitive status     Patient Diagnosis(es): There were no encounter diagnoses. has a past medical history of Dementia ([facility] [address] Gout, Hyperlipidemia, Hypertension, and Type II or unspecified type diabetes mellitus without mention of complication, not stated as uncontrolled. has a past surgical history that includes [facility] (Right, 2022) and craniotomy (N/A, 2022). Restrictions  Restrictions/Precautions  Restrictions/Precautions: Fall Risk  Required Braces or Orthoses?: No  Position Activity Restriction  Other position/activity restrictions: up w/assist  Subjective   General  Response To Previous Treatment: Patient with no complaints from previous session.   Family / Caregiver Present: No  Subjective  Subjective: Pt resting in bed upon arrival, pleasantly confused throughout  Pain Screening  Patient Currently in Pain: Denies  Vital Signs  Patient Currently in Pain: Denies       Orientation  Orientation  Overall Orientation Status: Impaired  Orientation Level: Oriented to person;Disoriented to place; Disoriented to time;Disoriented to situation  Cognition      Objective   Bed mobility  Rolling to Left: Maximum assistance  Rolling to Right: Maximum assistance  Supine to Sit: Maximum assistance  Sit to Supine: Maximum assistance  Scooting: Maximal assistance  Transfers  Sit to Stand: Maximum Assistance  Stand to sit: Maximum Assistance  Comment: sit to stand x 2 trials with RW and MAX A x1  Ambulation  Ambulation?: No (Pt able to take 1 step forward with R LE, unable to advance L)     Balance  Posture: Fair  Sitting - Static: Fair;+  Sitting - Dynamic: Fair  Standing - Static: Poor  Standing - Dynamic: Poor; - (Pt stood wtih RW x2 trials, ~1 min total, MOD A to maintain due to post lean)    Exercise  Seated LE exercise program: Long Arc Quads, hip abduction/adduction, heel/toe raises, and marches.  Reps: 5-10 reps with MAX cues to stay on task  AM-PAC Score  AM-PAC Inpatient Mobility Raw Score : 9 (03/16/22 1345)  AM-PAC Inpatient T-Scale Score : 30.55 (03/16/22 1345)  Mobility Inpatient CMS 0-100% Score: 81.38 (03/16/22 1345)  Mobility Inpatient CMS G-Code Modifier : CM (03/16/22 1345)          Goals  Short term goals  Time Frame for Short term goals: 14  Short term goal 1: Pt to perform bed mobility Venita  Short term goal 2: Pt to demonstrate functional transfers Venita  Short term goal 3: Pt to ambulate 10ft w/ RW modA  Short term goal 4: Demonstrate standing dynamic balance of fair - with bilateral UE support to decrease fall risk    Plan    Plan  Times per week: 5x/week  Current Treatment Recommendations: Strengthening,Home Exercise Program,ROM,Safety Education & Training,Balance Training,Endurance Training,Patient/Caregiver Education & Training,Functional Mobility Training,Transfer Training,Gait Training,Stair training  Safety Devices  Type of devices: Call light within reach,Bed alarm in place,Gait belt,Left in bed,Patient at risk for falls,Nurse notified  Restraints  Initially in place: No     Therapy Time   Individual Concurrent Group Co-treatment   Time In 0951         Time Out 1015         Minutes 24         Timed Code Treatment Minutes: [address] [facility]

## 2022-03-17 NOTE — PLAN OF CARE
Problem: Skin Integrity:  Goal: Will show no infection signs and symptoms  Description: Will show no infection signs and symptoms  3/17/2022 0926 by Robert Coe RN  Outcome: Ongoing  3/17/2022 0506 by Sushma Woods RN  Outcome: Met This Shift  Goal: Absence of new skin breakdown  Description: Absence of new skin breakdown  3/17/2022 0926 by Robert Coe RN  Outcome: Ongoing  3/17/2022 0506 by Sushma Woods RN  Outcome: Met This Shift     Problem: Falls - Risk of:  Goal: Will remain free from falls  Description: Will remain free from falls  3/17/2022 0926 by Robert Coe RN  Outcome: Ongoing  3/17/2022 0506 by Sushma Woods RN  Outcome: Met This Shift  Goal: Absence of physical injury  Description: Absence of physical injury  3/17/2022 0926 by Robert Coe RN  Outcome: Ongoing  3/17/2022 0506 by Sushma Woods RN  Outcome: Met This [address] [facility] in progress

## 2022-03-17 NOTE — PLAN OF CARE
Repeat CT head reviewed with Dr Sidney Ricks. CT head stable SDH improved SAH compared to previous CT head. Follow up with neurosurgery in 4-6 weeks as previously recommended.

## 2022-03-17 NOTE — PROGRESS NOTES
Comprehensive Nutrition Assessment    Type and Reason for Visit:  Initial,Consult (Poor PO intake)    Nutrition Recommendations/Plan:    - Recommend liberalized diet. Send Ensure ONS twice daily   - Encourage PO intake as tolerated    Nutrition Assessment:  Pt sleeping at time of visit. Noted possible d/c today. Per RN documentation, pt consumed 1-50% of meals yesterday. Currently on carb controlled diet. Malnutrition Assessment:  Malnutrition Status:  Insufficient data      Estimated Daily Nutrient Needs:  Energy (kcal):  3780-3676 kcals/day; Weight Used for Energy Requirements:  Current     Protein (g):  70-80 gm/day; Weight Used for Protein Requirements:  Current (1.1-1.2)        Fluid (ml/day):  25 mL/kg = 5911-8089 mL/day or per MD; Method Used for Fluid Requirements:  ml/Kg      Nutrition Related Findings:  meds/labs reviewed      Wounds:  Surgical Incision       Current Nutrition Therapies:    ADULT DIET; Regular: Carbohydrate Controlled (75 gm CHO per meal)      Anthropometric Measures:  · Height: 5' 5\" (165.1 cm)  · Current Body Weight: 145 lb 4.5 oz (65.9 kg)   · Ideal Body Weight: 125 lbs; % Ideal Body Weight 116.2 %   · BMI: 24.2  · BMI Categories: Normal Weight (BMI 18.5-24. 9)       Nutrition Diagnosis:   · Predicted inadequate energy intake related to  (?appetite, current condition, advanced age) as evidenced by  (decreased PO intake)      Nutrition Interventions:   Food and/or Nutrient Delivery:  Modify Current Diet,Start Oral Nutrition Supplement  Nutrition Education/Counseling:  No recommendation at this time   Coordination of Nutrition Care:  Continue to monitor while inpatient    Goals:  Meet greater than 50% of estimated nutrition needs       Nutrition Monitoring and Evaluation:   Behavioral-Environmental Outcomes:  None Identified   Food/Nutrient Intake Outcomes:  Food and Nutrient Intake,Supplement Intake  Physical Signs/Symptoms Outcomes:  Weight,Biochemical Data,Nutrition Focused Physical Findings,Skin     Discharge Planning:     Too soon to determine     Electronically signed by Shyanne Espitia MS, RD, LD on 3/17/22 at 2:47 PM EDT    Contact: [phone]

## 2022-03-17 NOTE — CARE COORDINATION
Transitional Planning    Contacted [facility] central admissions. Still awaiting precert for Michael. Updated patient and Benji Naas at bedside. Explained IMM and Perla signed. No questions.      0 Spoke with [facility] admissions again, still no update on precert

## 2022-03-17 NOTE — DISCHARGE SUMMARY
[facility]  Office: [phone]  Jesus Arnold, DO, Leo Yanez, DO, Audra Boast, DO, Xavier Leigh, DO, Effie Gonzalez MD, Erica Galarza MD, Karel Reynoso MD, Kavitha Ballesteros MD, Carrie Villagomez MD, Fabian Muñoz MD, Lauryn Noonan MD, Vincent Estes DO, Nona Robert DO, Bennett Villareal MD,  Robert Hollins DO, Philemon Simmonds, MD, Marlow Cowden, MD, Ele Porter MD, [facility], DO, Mehul Oneil MD, Juma Haines MD, Rody Canela [facility], [facility], [facility], Orly Quintero, CNP, Ranjith Caldwell, CNS, Kyle West, CNP, Chris Allen, CNP, Vishnu Briscoe, CNP, Morgan Goodson, CNP, Fuentes Levy, CNP, Binta Peralta PA-C, Alisia Styles, [facility], Petros Cisse, [facility], Ramo Driscoll, CNP, Baldwin Essex, CNP, Laron Armendariz, CNP, Jovani Frost, CNP, Hafsa Talavera, CNP, Gaynelle Pallas, [address] [facility]. [address] [facility]    Discharge Summary     Patient ID: Estevan Leon  :  1940   MRN: [id number]     ACCOUNT:  [de-identified]   Patient's PCP: Junior Wood PA-C  Admit Date: 3/14/2022   Discharge Date: 3/20    Length of Stay: 5  Code Status:  Full Code  Admitting Physician: Buster Montague DO  Discharge Physician: Buster Montague DO     Active Discharge Diagnoses:     Hospital Problem Lists:  Principal Problem:    NSTEMI (non-ST elevated myocardial infarction) [facility])  Active Problems:    Hypotension    Hypernatremia    Acute kidney injury ([facility] [address]    Acute cystitis    Metabolic encephalopathy from Hypernatremia     Type 2 diabetes mellitus without complication, without long-term current use of insulin (HCC)    Subdural hematoma (HCC)    S/P evacuation of subdural hematoma    Essential hypertension    Acute dehydration    Dysphagia    Acute hypokalemia    High anion gap metabolic acidosis  Resolved Problems:    * No resolved hospital problems.  *      Admission Condition:  stable     Discharged Condition: stable    Hospital Stay:     ALEKSANDRA JENSEN Course: Crescencio Brown is a 80 y.o. female who initially presented to our hospital for evaluation of hypotension and elevated troponin. Pt was found to have an GLORIA in addition to hypernatremia with sodium of 158 and a UTI . Cultures grew klebsiella. Pt was started on Rocephin, IV hydration for treatment of volume depletion and GLORIA/hypernatremia. Pt's sodium improved. UTI improved. Pt was evaluated by cardiology for elevation of troponin however workup was deferred until outpatient since pt was recently treated for brain bleed. Cardiology recommended outpatient follow up and medical management. Currently, pt medically stable for discharge. Will need to follow up with PCP within one week of discharge for post hospitalization follow up. Will also need to follow up with cardiology on an outpatient basis. Needs follow up with Neurosurgery  in 4-6 weeks    Significant therapeutic interventions:   See above  BP medications held for hypotension   Significant Diagnostic Studies:   Labs / Micro:  CBC:   Lab Results   Component Value Date    WBC 8.7 03/16/2022    RBC 3.81 03/16/2022    HGB 11.8 03/16/2022    HCT 37.6 03/16/2022    MCV 98.7 03/16/2022    MCH 31.0 03/16/2022    MCHC 31.4 03/16/2022    RDW 16.5 03/16/2022     03/16/2022     BMP:    Lab Results   Component Value Date    GLUCOSE 94 03/19/2022     03/19/2022    K 3.4 03/19/2022     03/19/2022    CO2 24 03/19/2022    ANIONGAP 11 03/19/2022    BUN 10 03/19/2022    CREATININE 0.67 03/19/2022    BUNCRER NOT REPORTED 02/21/2022    CALCIUM 9.0 03/19/2022    LABGLOM >60 03/19/2022    GFRAA >60 03/19/2022    GFR      03/19/2022        Radiology:  CT HEAD WO CONTRAST    Result Date: 3/16/2022  1. Unchanged acute/subacute right cerebral subdural hemorrhage. 2. Much less well visualized appearance of scattered right cerebral subarachnoid hemorrhage described previously. 3. Stable slight leftward midline shift.  4. Remote right basal ganglia and bilateral thalamus lacunar stroke. 5.  White matter hypoattenuation described is typical of microvascular ischemic disease or as sequela of dysmyelinating/demyelinating processes. CT HEAD WO CONTRAST    Result Date: 3/14/2022  From the previously noted areas of subarachnoid hemorrhage only minimal subarachnoid hemorrhage is left within the right frontal convexity sulci. The other areas have resolved. Changes related to right frontal craniotomy with interval removal of right subdural drain with 12 mm chronic subdural collection within the right frontoparietal region with 3-4 mm leftward midline shift. . Subdural collection is unchanged in size however the acute blood products have resolved. No evidence for acute intraparenchymal hemorrhage, territorial infarction or intracranial mass lesion. Moderate chronic microangiopathic ischemic disease. Unchanged foci of chronic lacunar infarction Mild generalized volume loss. RECOMMENDATIONS: Unavailable     XR CHEST PORTABLE    Result Date: 3/14/2022  No acute cardiopulmonary process. FL MODIFIED BARIUM SWALLOW W VIDEO    Result Date: 3/15/2022  No laryngeal penetration or aspiration. Premature spillage into the vallecula and piriform sinuses. Please see separate speech pathology report for full discussion of findings and recommendations. Consultations:    Consults:     Final Specialist Recommendations/Findings:   IP CONSULT TO CARDIOLOGY  IP CONSULT TO NEUROSURGERY  IP CONSULT TO IV TEAM  IP CONSULT TO DIETITIAN      The patient was seen and examined on day of discharge and this discharge summary is in conjunction with any daily progress note from day of discharge. Discharge plan:     Disposition: Home    Physician Follow Up:      [facility]  [address] [place] [address] [id number] [phone]  Schedule an appointment as soon as possible for a visit in 2 weeks  for hospital f/u with cardiology    Emilia Khan PA-C  [address] [facility]  [phone]    Schedule an appointment as soon as possible for a visit in 1 week  Hospital follow up. Esther JosephDO [address]  MOB #2 [facility] [address]  [facility]  [phone]    Schedule an appointment as soon as possible for a visit in 4 weeks  subdural hematoma        Requiring Further Evaluation/Follow Up POST HOSPITALIZATION/Incidental Findings:   -Follow up with PCP within one week of discharge for post hospitalization follow up  -Follow up with your cardiologist within one week for further workup and for evaluation and continued workup . Will need to go over results of Echo and plan for ischemic workup once your neurologic issues resolve.   -Continue outpatient follow up  With neurosurgery  -Return to the hospital if you develop any chest pain, nausea, vomiting, diarrhea, fever, chills  -Increase oral intake.      Diet: regular diet    Activity: As tolerated    Instructions to Patient: See above    Discharge Medications:      Medication List      START taking these medications    aspirin 81 MG chewable tablet  Take 1 tablet by mouth daily     cefUROXime 500 MG tablet  Commonly known as: CEFTIN  Take 1 tablet by mouth every 12 hours for 4 days     metoprolol succinate 25 MG extended release tablet  Commonly known as: TOPROL XL  Take 0.5 tablets by mouth at bedtime Hold if blood pressure less than 100 or if HR less than 60        CONTINUE taking these medications    acetaminophen 500 MG tablet  Commonly known as: TYLENOL     atorvastatin 40 MG tablet  Commonly known as: LIPITOR  TAKE ONE TABLET BY MOUTH DAILY     sennosides-docusate sodium 8.6-50 MG tablet  Commonly known as: SENOKOT-S  Take 2 tablets by mouth daily        STOP taking these medications    amLODIPine 10 MG tablet  Commonly known as: NORVASC     Janumet  MG per tablet  Generic drug: sitaGLIPtan-metFORMIN     losartan-hydroCHLOROthiazide 100-25 MG per tablet  Commonly known as: HYZAAR Where to Get Your Medications      Information about where to get these medications is not yet available    Ask your nurse or doctor about these medications  aspirin 81 MG chewable tablet  cefUROXime 500 MG tablet  metoprolol succinate 25 MG extended release tablet         No discharge procedures on file. Time Spent on discharge is  36 mins in patient examination, evaluation, counseling as well as medication reconciliation, prescriptions for required medications, discharge plan and follow up. Electronically signed by   Mame Beaver DO  3/19/2022  10:59 AM      Thank you Dr. Michael Scott PA-C for the opportunity to be involved in this patient's care.

## 2022-03-17 NOTE — PROGRESS NOTES
[facility]  Office: [address], DO, Sha Harris, DO, Perry Del Rosario, DO, Barrett Late Blood, DO, Chantell Rodriguez MD, Jenn Amato MD, Lorena Donahue MD, Lilia Hoyt MD, Juany Sharif MD, Bernarda Mary MD, Davonte Zuleta MD, Cristela Lama, DO, Shiv Worthy, DO, Shanita Boyd MD,  Dariela Willoughby, DO, Tevin Schwartz MD, Arina Padron MD, Lisbeth Dickinson MD, Myranda Hodge, DO, Sherry Damon MD, Yuliana Covington MD, Jl Bailey, [facility], [facility] Angel, CNP, Obdulia Garcia, CNP, Frank Prakash, CNS, Alfredo Osgood, CNP, Raysa Spence, CNP, Lenny Perez, CNP, Dorian Sterling, CNP, Herman Fan, CNP, Dafne Kennedy PA-C, Jojo Marmolejo, [facility], Octavio Montague, [facility], Julio Urban, CNP, Myla Montero, CNP, Nathan Wakefield, CNP, tSephan Canales, CNP, Lana Cleary, CNP, Raleigh Worthington, [address] [place] [address]    Progress Note    3/17/2022    9:20 AM    Name:   Julia Callahan  MRN:     [id number]     Acct:      [de-identified]   Room:   [address] [place] [address] Day:  3  Admit Date:  3/14/2022 10:00 PM    PCP:   Saint Gain, PA-C  Code Status:  Full Code    Subjective:     C/C: No chief complaint on file. Interval History Status: improved    Patient looking good today. She is awake, alert no distress. She is more responsive. Knows she is in a hospital. Doesn't know year. Oriented to self. No fevers, chills, chest pain. Sodium is improving     Brief History:     70-year-old female presented to the hospital for evaluation of hypotension. Patient was found to have an GLORIA in addition to significant hyponatremia with sodium of 158. Patient was managed with IV fluids. Infectious work-up was done, last consistent with UTI. Start antibiotics. Review of Systems:     Patient reports starting, unable to answer questions but denies all complaints  Medications:      Allergies:  No Known Allergies    Current Meds:   Scheduled Meds:    metoprolol succinate 12.5 mg Oral Nightly    cefTRIAXone (ROCEPHIN) IV  1,000 mg IntraVENous Q24H    aspirin  81 mg Oral Daily    atorvastatin  40 mg Oral Daily    sennosides-docusate sodium  2 tablet Oral Daily    sodium chloride flush  5-40 mL IntraVENous 2 times per day    insulin lispro  0-12 Units SubCUTAneous TID WC    insulin lispro  0-6 Units SubCUTAneous Nightly     Continuous Infusions:    dextrose 50 mL/hr at 22 0847    sodium chloride      dextrose Stopped (22 1548)     PRN Meds: acetaminophen, sodium chloride flush, sodium chloride, ondansetron **OR** ondansetron, glucose, dextrose, glucagon (rDNA), dextrose    Data:     Past Medical History:   has a past medical history of Dementia ([address] Gout, Hyperlipidemia, Hypertension, and Type II or unspecified type diabetes mellitus without mention of complication, not stated as uncontrolled. Social History:   reports that she quit smoking about 41 years ago. Her smoking use included cigarettes. She quit after 20.00 years of use. She has never used smokeless tobacco.     Family History:   Family History   Problem Relation Age of Onset    High Blood Pressure Mother        Vitals:  BP (!) 89/59   Pulse 53   Temp 97 °F (36.1 °C) (Temporal)   Resp 17   Wt 145 lb 4.5 oz (65.9 kg)   SpO2 93%   BMI 24.18 kg/m²   Temp (24hrs), Av.2 °F (36.2 °C), Min:96.9 °F (36.1 °C), Max:98 °F (36.7 °C)    Recent Labs     22  1138 22  1512 22  1939 22  0703   POCGLU 129* 130* 92 120*       I/O (24Hr):     Intake/Output Summary (Last 24 hours) at 3/17/2022 0920  Last data filed at 3/16/2022 1900  Gross per 24 hour   Intake 2432.43 ml   Output 300 ml   Net 2132.43 ml       Labs:  Hematology:  Recent Labs     22  1401 03/15/22  0126 03/15/22  0644 22  0420   WBC 9.4 9.6  --  8.7   RBC 4.78 3.99  --  3.81*   HGB 15.2 12.3  --  11.8*   HCT 46.0 39.2  --  37.6   MCV 96.1 98.2  --  98.7   MCH 31.8 30.8  --  31.0   MCHC 33.1 31.4  --  31.4 RDW 16.4* 16.7*  --  16.5*    231  --  218   MPV 8.9 10.7  --  10.7   INR  --   --  1.2  --      Chemistry:  Recent Labs     03/14/22  1401 03/14/22  1401 03/14/22  1554 03/14/22  2318 03/15/22  0239 03/15/22  0644 03/15/22  0855 03/16/22  0420 03/16/22 2022 03/17/22  0416   *   < >  --   --  153*   < >  --  147* 144 141   K 4.8   < >  --   --  4.0   < >  --  3.3* 4.1 3.7   *   < >  --   --  115*   < >  --  111* 105 105   CO2 21   < >  --   --  24   < >  --  23 23 21   GLUCOSE 165*   < >  --   --  109*   < >  --  114* 122* 117*   BUN 48*   < >  --   --  49*   < >  --  33* 23 18   CREATININE 1.50*   < >  --   --  1.22*   < >  --  0.85 0.74 0.64   MG  --   --   --   --  2.0  --   --   --   --   --    ANIONGAP 19*   < >  --   --  14   < >  --  13 16 15   LABGLOM 33*   < >  --   --  42*   < >  --  >60 >60 >60   GFRAA 40*   < >  --   --  51*   < >  --  >60 >60 >60   CALCIUM 10.1   < >  --   --  9.1   < >  --  9.0 9.0 8.6   PHOS  --   --   --   --   --   --   --  2.7  --  2.3*   PROBNP 11,047*  --   --  1,363*  --   --   --   --   --   --    TROPHS 233*  --    < > 172* 180*  --  159*  --   --   --    MYOGLOBIN  --   --   --  85* 77*  --  60*  --   --   --    LACTACIDWB  --   --   --   --  1.7  --   --   --   --   --     < > = values in this interval not displayed. Recent Labs     03/15/22  2042 03/16/22  0420 03/16/22  0631 03/16/22  1138 03/16/22  1512 03/16/22  1939 03/17/22  0416 03/17/22  0703   LABALBU  --  3.5  --   --   --   --  3.2*  --    POCGLU 110*  --  98 129* 130* 92  --  120*     ABG:  Lab Results   Component Value Date    FIO2 NOT REPORTED 02/17/2022     Lab Results   Component Value Date/Time    SPECIAL NOT REPORTED 02/19/2022 08:21 AM     Lab Results   Component Value Date/Time    CULTURE Unable to perform testing: No specimen received.  03/15/2022 02:14 PM       Radiology:  CT HEAD WO CONTRAST    Result Date: 3/14/2022  From the previously noted areas of subarachnoid hemorrhage only minimal subarachnoid hemorrhage is left within the right frontal convexity sulci. The other areas have resolved. Changes related to right frontal craniotomy with interval removal of right subdural drain with 12 mm chronic subdural collection within the right frontoparietal region with 3-4 mm leftward midline shift. . Subdural collection is unchanged in size however the acute blood products have resolved. No evidence for acute intraparenchymal hemorrhage, territorial infarction or intracranial mass lesion. Moderate chronic microangiopathic ischemic disease. Unchanged foci of chronic lacunar infarction Mild generalized volume loss. RECOMMENDATIONS: Unavailable     XR CHEST PORTABLE    Result Date: 3/14/2022  No acute cardiopulmonary process. Physical Examination:        General appearance:  alert, cooperative and chronically ill appearing  Mental Status: She is not oriented to person, place or time.   Incision noted over previous bur hole  Lungs:  clear to auscultation bilaterally, normal effort  Heart:  regular rate and rhythm, no murmur  Abdomen:  soft, nontender, nondistended, normal bowel sounds, no masses, hepatomegaly, splenomegaly  Extremities:  no edema, redness, tenderness in the calves  Skin:  no gross lesions, rashes, induration    Assessment:        Hospital Problems           Last Modified POA    * (Principal) NSTEMI (non-ST elevated myocardial infarction) ([address] 3/16/2022 Yes    Hypotension due to hypovolemia 3/15/2022 Yes    Hypernatremia 3/15/2022 Yes    Acute kidney injury ([address] 3/15/2022 Yes    Acute cystitis 6/86/6446 Yes    Metabolic encephalopathy from Hypernatremia  3/17/2022 Yes    Type 2 diabetes mellitus without complication, without long-term current use of insulin ([address] 3/15/2022 Yes    Subdural hematoma ([address] 3/15/2022 Yes    S/P evacuation of subdural hematoma 3/15/2022 Yes    Essential hypertension 3/15/2022 Yes    Acute dehydration 3/15/2022 Yes    Dysphagia 3/15/2022 Yes Acute hypokalemia 3/16/2022 No          Plan:         Toxic metabolic encephalopathy from UTI and Electrolyte abnormalities: Free water deficit 2000 cc. Encourage oral intake. Pt has been having decreased oral intake for quite some time. Discussed with POA, will encourage more oral intake. Family hesitant to get PEG tube evaluation since pt has history of pulling lines/infections. NSTEMI:Troponin is stable. Completed heparin gtt for 48 hours. Spoke with Cardiology, okay to get echo outpatient. No further ischemic workup until neurologic issues resolve. Family agreeable. Acute Kidney injury: baseline 0.6, was 1.5 on admission now 1.1. Likely pre-renal etiology due to hypovolemia from decreased oral intake and hypotension. Hold antihypertensives including Losartan, HCTZ. Monitor I/O's. Acute Hypokalemia: replace potassium   Urinary Tract infection: UA shows positive nitrates, large leuk esterase, many bacteria. Previous cultures positive for klebsiella sensitive to Rocephin. Will transition to PO prior to dc for 5 days total.   History of subdural hematoma requiring bur hole previously: Neurosurgery okay to continue heparin infusion without bolus, No intervention at this time. Repeat CT unchanged.   HAGMA: resolved   Primary Hypertension: Hold home medications given hypotension  Non-insulin-dependent diabetes mellitus type 2 with hyperglycemia: Hold home meds, start ISS goal blood glucose 140-180  Dyslipidemia: continue statin  DVT ppx  PTOT    Dispo: plan for dc today   Zo Trent DO  3/17/2022  9:20 AM

## 2022-03-18 PROBLEM — I95.9 HYPOTENSION: Status: ACTIVE | Noted: 2022-03-15

## 2022-03-18 LAB
ALBUMIN SERPL-MCNC: 3.2 G/DL (ref 3.5–5.2)
ANION GAP SERPL CALCULATED.3IONS-SCNC: 12 MMOL/L (ref 9–17)
BUN BLDV-MCNC: 11 MG/DL (ref 8–23)
CALCIUM SERPL-MCNC: 8.7 MG/DL (ref 8.6–10.4)
CHLORIDE BLD-SCNC: 104 MMOL/L (ref 98–107)
CO2: 22 MMOL/L (ref 20–31)
CREAT SERPL-MCNC: 0.58 MG/DL (ref 0.5–0.9)
GFR AFRICAN AMERICAN: >60 ML/MIN
GFR NON-AFRICAN AMERICAN: >60 ML/MIN
GFR SERPL CREATININE-BSD FRML MDRD: ABNORMAL ML/MIN/{1.73_M2}
GLUCOSE BLD-MCNC: 113 MG/DL (ref 65–105)
GLUCOSE BLD-MCNC: 113 MG/DL (ref 65–105)
GLUCOSE BLD-MCNC: 92 MG/DL (ref 65–105)
GLUCOSE BLD-MCNC: 95 MG/DL (ref 70–99)
GLUCOSE BLD-MCNC: 96 MG/DL (ref 65–105)
PHOSPHORUS: 2.9 MG/DL (ref 2.6–4.5)
POTASSIUM SERPL-SCNC: 3.6 MMOL/L (ref 3.7–5.3)
REASON FOR REJECTION: NORMAL
SODIUM BLD-SCNC: 138 MMOL/L (ref 135–144)
ZZ NTE CLEAN UP: ORDERED TEST: NORMAL
ZZ NTE WITH NAME CLEAN UP: SPECIMEN SOURCE: NORMAL

## 2022-03-18 PROCEDURE — 6370000000 HC RX 637 (ALT 250 FOR IP): Performed by: NURSE PRACTITIONER

## 2022-03-18 PROCEDURE — 2060000000 HC ICU INTERMEDIATE R&B

## 2022-03-18 PROCEDURE — 97530 THERAPEUTIC ACTIVITIES: CPT

## 2022-03-18 PROCEDURE — 97535 SELF CARE MNGMENT TRAINING: CPT

## 2022-03-18 PROCEDURE — 6370000000 HC RX 637 (ALT 250 FOR IP): Performed by: INTERNAL MEDICINE

## 2022-03-18 PROCEDURE — 2580000003 HC RX 258: Performed by: NURSE PRACTITIONER

## 2022-03-18 PROCEDURE — 80069 RENAL FUNCTION PANEL: CPT

## 2022-03-18 PROCEDURE — 97110 THERAPEUTIC EXERCISES: CPT

## 2022-03-18 PROCEDURE — 92526 ORAL FUNCTION THERAPY: CPT

## 2022-03-18 PROCEDURE — 82947 ASSAY GLUCOSE BLOOD QUANT: CPT

## 2022-03-18 PROCEDURE — 99239 HOSP IP/OBS DSCHRG MGMT >30: CPT | Performed by: INTERNAL MEDICINE

## 2022-03-18 PROCEDURE — 36415 COLL VENOUS BLD VENIPUNCTURE: CPT

## 2022-03-18 RX ADMIN — SODIUM CHLORIDE, PRESERVATIVE FREE 10 ML: 5 INJECTION INTRAVENOUS at 09:37

## 2022-03-18 RX ADMIN — DESMOPRESSIN ACETATE 40 MG: 0.2 TABLET ORAL at 10:55

## 2022-03-18 RX ADMIN — ASPIRIN 81 MG: 81 TABLET, CHEWABLE ORAL at 10:53

## 2022-03-18 RX ADMIN — SODIUM CHLORIDE, PRESERVATIVE FREE 10 ML: 5 INJECTION INTRAVENOUS at 21:19

## 2022-03-18 RX ADMIN — STANDARDIZED SENNA CONCENTRATE AND DOCUSATE SODIUM 2 TABLET: 8.6; 5 TABLET ORAL at 10:56

## 2022-03-18 RX ADMIN — CEFUROXIME AXETIL 500 MG: 500 TABLET ORAL at 21:19

## 2022-03-18 RX ADMIN — CEFUROXIME AXETIL 500 MG: 500 TABLET ORAL at 09:34

## 2022-03-18 ASSESSMENT — PAIN SCALES - WONG BAKER

## 2022-03-18 ASSESSMENT — PAIN SCALES - GENERAL
PAINLEVEL_OUTOF10: 0
PAINLEVEL_OUTOF10: 0

## 2022-03-18 NOTE — PROGRESS NOTES
[facility]  Office: [address], DO, Zari Rachel, DO, Hermes Goldman, DO, Jazz Whitaker Blood, DO, Tai Marie MD, Rosemarie Ingram MD, Evangelista Schumacher MD, Daljit Hightower MD, Adria Gutiérrez MD, Alisia Chao MD, Ayad Jolly MD, Raven Luke, DO, Darcy Marsh, DO, Harrietta Goldberg, MD,  Ariel Jennings, DO, Sascha Rodriguez MD, Angely Bond MD, Nelson Bell MD, Colleen Gutierrez DO, Mel Polanco MD, Iliana Springer MD, [facility], [place] [facility] Ar[place], [facility], [facility] Donatomonica, CNP, Thierno Garcia, CNP, Zain Jean-Baptiste, CNS, Alfonso Montiel, CNP, Mateo Garcia, CNP, Teddy Sauceda, CNP, Sandra Payton, CNP, Ari Rust, CNP, Radha Rhoades PA-C, Tena Escudero, [facility], Felix Cochran, [facility], Luh Crawford, CNP, Chase Plush, CNP, Marcell Dawn, [facility], Winnie Blackmon, CNP, Ysabel Alcocer, CNP, [facility], [address] [place] [address]    Progress Note    3/18/2022    11:13 AM    Name:   George Castillo  MRN:     [id number]     Acct:      [de-identified]   Room:   [address] [place] [address] Day:  4  Admit Date:  3/14/2022 10:00 PM    PCP:   Becca Bryan PA-C  Code Status:  Full Code    Subjective:     C/C: No chief complaint on file. Interval History Status: improved    Patient looking good today. She is awake, alert no distress. She is more responsive. Oriented to person    Brief History:      George Castillo is a 80 y.o. female who initially presented to our hospital for evaluation of hypotension and elevated troponin. Pt was found to have an GLORIA in addition to hypernatremia with sodium of 158 and a UTI . Cultures grew klebsiella. Pt was started on Rocephin, IV hydration for treatment of volume depletion and GLORIA/hypernatremia. Pt's sodium improved. UTI improved. Pt was evaluated by cardiology for elevation of troponin however workup was deferred until outpatient since pt was recently treated for brain bleed.  Cardiology recommended outpatient follow up and medical management. Currently, pt medically stable for discharge. Will need to follow up with PCP within one week of discharge for post hospitalization follow up. Will also need to follow up with cardiology on an outpatient basis.        Review of Systems:     Patient reports starting, unable to answer questions but denies all complaints  Medications: Allergies:  No Known Allergies    Current Meds:   Scheduled Meds:    cefUROXime  500 mg Oral 2 times per day    metoprolol succinate  12.5 mg Oral Nightly    aspirin  81 mg Oral Daily    atorvastatin  40 mg Oral Daily    sennosides-docusate sodium  2 tablet Oral Daily    sodium chloride flush  5-40 mL IntraVENous 2 times per day    insulin lispro  0-12 Units SubCUTAneous TID WC    insulin lispro  0-6 Units SubCUTAneous Nightly     Continuous Infusions:    sodium chloride      dextrose Stopped (22 1548)     PRN Meds: acetaminophen, sodium chloride flush, sodium chloride, ondansetron **OR** ondansetron, glucose, dextrose, glucagon (rDNA), dextrose    Data:     Past Medical History:   has a past medical history of Dementia ([address] Gout, Hyperlipidemia, Hypertension, and Type II or unspecified type diabetes mellitus without mention of complication, not stated as uncontrolled. Social History:   reports that she quit smoking about 41 years ago. Her smoking use included cigarettes. She quit after 20.00 years of use. She has never used smokeless tobacco.     Family History:   Family History   Problem Relation Age of Onset    High Blood Pressure Mother        Vitals:  /72   Pulse 65   Temp 97.4 °F (36.3 °C) (Temporal)   Resp 19   Ht 5' 5\" (1.651 m)   Wt 140 lb 10.5 oz (63.8 kg)   SpO2 94%   BMI 23.41 kg/m²   Temp (24hrs), Av.4 °F (36.9 °C), Min:97.4 °F (36.3 °C), Max:98.8 °F (37.1 °C)    Recent Labs     22  0703 22  1545 22  0621   POCGLU 120* 115* 107* 92       I/O (24Hr):     Intake/Output Summary (Last 24 hours) at 3/18/2022 1113  Last data filed at 3/17/2022 1637  Gross per 24 hour   Intake 1005.57 ml   Output 450 ml   Net 555.57 ml       Labs:  Hematology:  Recent Labs     03/16/22 0420   WBC 8.7   RBC 3.81*   HGB 11.8*   HCT 37.6   MCV 98.7   MCH 31.0   MCHC 31.4   RDW 16.5*      MPV 10.7     Chemistry:  Recent Labs     03/16/22  0420 03/16/22  0420 03/16/22 2022 03/17/22  0416 03/18/22  0520   *   < > 144 141 138   K 3.3*   < > 4.1 3.7 3.6*   *   < > 105 105 104   CO2 23   < > 23 21 22   GLUCOSE 114*   < > 122* 117* 95   BUN 33*   < > 23 18 11   CREATININE 0.85   < > 0.74 0.64 0.58   ANIONGAP 13   < > 16 15 12   LABGLOM >60   < > >60 >60 >60   GFRAA >60   < > >60 >60 >60   CALCIUM 9.0   < > 9.0 8.6 8.7   PHOS 2.7  --   --  2.3* 2.9    < > = values in this interval not displayed. Recent Labs     03/16/22  0420 03/16/22  0631 03/16/22  1512 03/16/22  1939 03/17/22  0416 03/17/22  0703 03/17/22  1545 03/17/22  2019 03/18/22  0520 03/18/22  0621   LABALBU 3.5  --   --   --  3.2*  --   --   --  3.2*  --    POCGLU  --    < > 130* 92  --  120* 115* 107*  --  92    < > = values in this interval not displayed. ABG:  Lab Results   Component Value Date    FIO2 NOT REPORTED 02/17/2022     Lab Results   Component Value Date/Time    SPECIAL NOT REPORTED 02/19/2022 08:21 AM     Lab Results   Component Value Date/Time    CULTURE Unable to perform testing: No specimen received. 03/15/2022 02:14 PM       Radiology:  CT HEAD WO CONTRAST    Result Date: 3/14/2022  From the previously noted areas of subarachnoid hemorrhage only minimal subarachnoid hemorrhage is left within the right frontal convexity sulci. The other areas have resolved. Changes related to right frontal craniotomy with interval removal of right subdural drain with 12 mm chronic subdural collection within the right frontoparietal region with 3-4 mm leftward midline shift. . Subdural collection is unchanged in size however the acute blood products have resolved. No evidence for acute intraparenchymal hemorrhage, territorial infarction or intracranial mass lesion. Moderate chronic microangiopathic ischemic disease. Unchanged foci of chronic lacunar infarction Mild generalized volume loss. RECOMMENDATIONS: Unavailable     XR CHEST PORTABLE    Result Date: 3/14/2022  No acute cardiopulmonary process. Physical Examination:        General appearance:  alert, cooperative and chronically ill appearing  Mental Status: She is not oriented to person, place or time. Incision noted over previous bur hole  Lungs:  clear to auscultation bilaterally, normal effort  Heart:  regular rate and rhythm, no murmur  Abdomen:  soft, nontender, nondistended, normal bowel sounds, no masses, hepatomegaly, splenomegaly  Extremities:  no edema, redness, tenderness in the calves  Skin:  no gross lesions, rashes, induration    Assessment:        Hospital Problems           Last Modified POA    * (Principal) NSTEMI (non-ST elevated myocardial infarction) ([address] 3/16/2022 Yes    Hypotension due to hypovolemia 3/15/2022 Yes    Hypernatremia 3/15/2022 Yes    Acute kidney injury ([address] 3/15/2022 Yes    Acute cystitis 9/49/0727 Yes    Metabolic encephalopathy from Hypernatremia  3/17/2022 Yes    Type 2 diabetes mellitus without complication, without long-term current use of insulin ([address] 3/15/2022 Yes    Subdural hematoma ([address] 3/15/2022 Yes    S/P evacuation of subdural hematoma 3/15/2022 Yes    Essential hypertension 3/15/2022 Yes    Acute dehydration 3/15/2022 Yes    Dysphagia 3/15/2022 Yes    Acute hypokalemia 3/16/2022 No          Plan:         Toxic metabolic encephalopathy from UTI and Electrolyte abnormalities: Free water deficit 2000 cc. Encourage oral intake. Pt has been having decreased oral intake for quite some time. Discussed with POA, will encourage more oral intake. Family hesitant to get PEG tube evaluation since pt has history of pulling lines/infections. NSTEMI:Troponin is stable. Completed heparin gtt for 48 hours. Spoke with Cardiology, ping to get echo outpatient. No further ischemic workup until neurologic issues resolve. Family agreeable. Acute Kidney injury: baseline 0.6, was 1.5 on admission now 1.1. Likely pre-renal etiology due to hypovolemia from decreased oral intake and hypotension. Hold antihypertensives including Losartan, HCTZ. Monitor I/O's. Acute Hypokalemia: replace potassium   Urinary Tract infection: UA shows positive nitrates, large leuk esterase, many bacteria. Previous cultures positive for klebsiella sensitive to Rocephin. Will transition to PO prior to dc for 5 days total.   History of subdural hematoma requiring bur hole previously: Neurosurgery okay to continue heparin infusion without bolus, No intervention at this time. Repeat CT unchanged.  Needs follow up with CTS in 4-6 weeks  HAGMA: resolved   Primary Hypertension: Hold home medications given hypotension  Non-insulin-dependent diabetes mellitus type 2 with hyperglycemia: Hold home meds, start ISS goal blood glucose 140-180  Dyslipidemia: continue statin  DVT ppx  PTOT    Dispo: Dc order placed 3/16 waiting on placement  Yanick Sow DO  3/18/2022  11:13 AM

## 2022-03-18 NOTE — CARE COORDINATION
Transitional Planning    Received call from [place], [place] for [employer], they are still awaiting precert and submitted updated notes to insurance provider

## 2022-03-18 NOTE — PROGRESS NOTES
Signs  Patient Currently in Pain: No       Orientation  Orientation  Overall Orientation Status: Impaired  Orientation Level: Oriented to person;Disoriented to place; Disoriented to time;Disoriented to situation  Cognition      Objective   Bed mobility  Rolling to Left: Maximum assistance  Rolling to Right: Maximum assistance  Supine to Sit: Maximum assistance  Sit to Supine: Maximum assistance  Scooting: Maximal assistance  Transfers  Sit to Stand: Maximum Assistance  Stand to sit: Maximum Assistance  Ambulation  Ambulation?: No (Prev able to take 1 step, decreased tolerance to standing this date)     Balance  Posture: Fair  Sitting - Static: Fair;+  Sitting - Dynamic: Fair (Pt sat EOB x 15 min, requrig SBA once seated balance estabilshed)  Standing - Static: Poor (Pt stood wtih RW 2 trials, ~ 30 sec due to heavy post lean, and unable to maintain full standing)    Exercise  B LE AAROM in all planes x 10        AM-PAC Score  AM-PAC Inpatient Mobility Raw Score : 9 (03/16/22 1345)  AM-PAC Inpatient T-Scale Score : 30.55 (03/16/22 1345)  Mobility Inpatient CMS 0-100% Score: 81.38 (03/16/22 1345)  Mobility Inpatient CMS G-Code Modifier : CM (03/16/22 1345)          Goals  Short term goals  Time Frame for Short term goals: 14  Short term goal 1: Pt to perform bed mobility Venita  Short term goal 2: Pt to demonstrate functional transfers Venita  Short term goal 3: Pt to ambulate 10ft w/ RW modA  Short term goal 4: Demonstrate standing dynamic balance of fair - with bilateral UE support to decrease fall risk    Plan    Plan  Times per week: 5x/week  Current Treatment Recommendations: Strengthening,Home Exercise Program,ROM,Safety Education & Training,Balance Training,Endurance Training,Patient/Caregiver Education & Training,Functional Mobility Training,Transfer Training,Gait Training,Stair training  Safety Devices  Type of devices: Call light within reach,Bed alarm in place,Gait belt,Left in bed,Patient at risk for falls,Nurse notified  Restraints  Initially in place: No     Therapy Time   Individual Concurrent Group Co-treatment   Time In 1110         Time Out 1136         Minutes 26         Timed Code Treatment Minutes: [address] [place], [place]

## 2022-03-18 NOTE — DISCHARGE INSTR - COC
Continuity of Care Form    Patient Name: Julia Callahan   :  1940  MRN:  [id number]    [facility] date:  3/14/2022  Discharge date:  3/20/22    Code Status Order: Full Code   Advance Directives:      Admitting Physician:  Juan Dean DO  PCP: Saint Gain, PA-C    Discharging Nurse: Lalo Eaton., RN  [address] Unit/Room#: 3274/1619-01  Discharging Unit Phone Number: [phone]    Emergency Contact:   Extended Emergency Contact Information  Primary Emergency Contact: Judy Xie  Address: [address] [facility] ,[address], [address] Phone: [phone]  Relation: Other    Past Surgical History:  Past Surgical History:   Procedure Laterality Date    GILLIAN HOLE Right 2022    GILLIAN HOLE FOR HEMATOMA EVACUATION/EVDS to subdural space    CRANIOTOMY N/A 2022    GILLIAN HOLE FOR SUBDURAL HEMATOMA EVACUATION performed by Chino Snow MD at [address] [place] [address] History:   Immunization History   Administered Date(s) Administered    Influenza, Quadv, IM, (6 mo and older Fluzone, Flulaval, Fluarix and 3 yrs and older Afluria) 2017    Pneumococcal Conjugate 13-valent Daniella Olsen) 2017       Active Problems:  Patient Active Problem List   Diagnosis Code    Essential hypertension I10    Type 2 diabetes mellitus without complication, without long-term current use of insulin ([address] E11.9    Mixed hyperlipidemia E78.2    Non morbid obesity due to excess calories E66.09    Progressive cognitive dysfunction F09    Altered mental status R41.82    Dementia with behavioral disturbance (HCC) F03.91    Dementia with behavioral problem ([address] F03.91    Subdural hematoma ([address] S06.5X9A    S/P evacuation of subdural hematoma Z98.890, Z86.79    NSTEMI (non-ST elevated myocardial infarction) ([address] I21.4    Acute dehydration E86.0    Hypotension I95.9    Hypernatremia E87.0    Acute kidney injury ([address] N17.9    Dysphagia R13.10    Acute cystitis N30.00    Acute hypokalemia R51.1    Metabolic encephalopathy from Hypernatremia  G93.41    High anion gap metabolic acidosis C63.4       Isolation/Infection:   Isolation            No Isolation          Patient Infection Status       Infection Onset Added Last Indicated Last Indicated By Review Planned Expiration Resolved Resolved By    None active    Resolved    COVID-19 (Rule Out) 02/17/22 02/17/22 02/17/22 COVID-19, Rapid (Ordered)   02/17/22 Rule-Out Test Resulted            Nurse Assessment:  Last Vital Signs: /72   Pulse 65   Temp 97.4 °F (36.3 °C) (Temporal)   Resp 19   Ht 5' 5\" (1.651 m)   Wt 140 lb 10.5 oz (63.8 kg)   SpO2 94%   BMI 23.41 kg/m²     Last documented pain score (0-10 scale): Pain Level: 0  Last Weight:   Wt Readings from Last 1 Encounters:   03/18/22 140 lb 10.5 oz (63.8 kg)     Mental Status:  alert    IV Access:  - None    Nursing Mobility/ADLs:  Walking   Dependent  Transfer  Dependent  Bathing  Dependent  Dressing  Dependent  Toileting  Dependent  Feeding  Dependent  Med Admin  Dependent  Med Delivery   crushed    Wound Care Documentation and Therapy:        Elimination:  Continence: Bowel: No  Bladder: No  Urinary Catheter: None   Colostomy/Ileostomy/Ileal Conduit: No       Date of Last BM: 3/20/22    Intake/Output Summary (Last 24 hours) at 3/18/2022 1115  Last data filed at 3/17/2022 1637  Gross per 24 hour   Intake 1005.57 ml   Output 450 ml   Net 555.57 ml     I/O last 3 completed shifts: In: 1025.6 [I.V.:1025.6]  Out: 450 [Urine:450]    Safety Concerns:     History of Falls (last 30 days), At Risk for Falls, and Aspiration Risk    Impairments/Disabilities:      Language Barrier - ***    Nutrition Therapy:  Current Nutrition Therapy:   - Oral Diet:  [address] COREY Oral APCQ:[id number]}    Routes of Feeding: Oral  Liquids:  Thin Liquids  Daily Fluid Restriction: no  Last Modified Barium Swallow with Video (Video Swallowing Test): done on 03/15    Treatments at the Time of Hospital Discharge:   Respiratory Treatments: NOne  Oxygen Therapy:  is not on home oxygen therapy. Ventilator:    - No ventilator support    Rehab Therapies: Physical Therapy and Occupational Therapy  Weight Bearing Status/Restrictions: No weight bearing restrictions  Other Medical Equipment (for information only, NOT a DME order):  {EQUIPMENT:[id number]}  Other Treatments: ***    Patient's personal belongings (please select all that are sent with patient):  None    RN SIGNATURE:  Electronically signed by Lyle Toney RN on 3/20/22 at 6:49 PM EDT    CASE MANAGEMENT/SOCIAL WORK SECTION    Inpatient Status Date: ***    Readmission Risk Assessment Score:  Readmission Risk              Risk of Unplanned Readmission:  19           Discharging to Facility/ Agency   Name: [facility]  Address:  [facility] [address]         Phone: [phone]        Phone:      Dialysis Facility (if applicable)   Name:      / signature: Electronically signed by John Posada RN on 3/20/22 at 4:10 PM EDT    PHYSICIAN SECTION    Prognosis: Fair    Condition at Discharge: Stable    Rehab Potential (if transferring to Rehab): Fair    Recommended Labs or Other Treatments After Discharge:     -Follow up with PCP within one week of discharge for post hospitalization follow up  -Follow up with your cardiologist within one week for further workup and for evaluation and continued workup . Will need to go over results of Echo and plan for ischemic workup once your neurologic issues resolve.   -Continue outpatient follow up  With neurosurgery  -Return to the hospital if you develop any chest pain, nausea, vomiting, diarrhea, fever, chills  -Increase oral intake.    Follow up with Neurosurgery Odette Dhaliwal in 4- 6 weeks  Physician Certification: I certify the above information and transfer of Minerva Stevenson  is necessary for the continuing treatment of the diagnosis listed and that she requires Skilled Nursing Facility for greater 30 days.      Update Admission H&P: No change in H&P    PHYSICIAN SIGNATURE:  Electronically signed by Zo Trent DO on 3/18/22 at 11:16 AM EDT

## 2022-03-18 NOTE — PROGRESS NOTES
ST recommends continuing with regular diet and thin liquids at this time. Pt. provided with education re: safe swallow strategies. Pt. demonstrated understanding. If s/s of aspiration occur, d/c all PO and recommend repeat MBSS. Results and recommendations reported to RN. Plan:  [x] Continue ST services    [] Discharge from ST:        Discharge recommendations: []  Further therapy recommended at discharge. The patient should be able to tolerate at least 3 hours of therapy per day over 5 days or 15 hours over 7 days. [x] Further therapy recommended at discharge. [] No therapy recommended at discharge. Treatment completed by: Terese Ramires, SLP, M.A.  CCC-SLP

## 2022-03-19 LAB
ALBUMIN SERPL-MCNC: 3.2 G/DL (ref 3.5–5.2)
ANION GAP SERPL CALCULATED.3IONS-SCNC: 11 MMOL/L (ref 9–17)
BUN BLDV-MCNC: 10 MG/DL (ref 8–23)
CALCIUM SERPL-MCNC: 9 MG/DL (ref 8.6–10.4)
CHLORIDE BLD-SCNC: 105 MMOL/L (ref 98–107)
CO2: 24 MMOL/L (ref 20–31)
CREAT SERPL-MCNC: 0.67 MG/DL (ref 0.5–0.9)
GFR AFRICAN AMERICAN: >60 ML/MIN
GFR NON-AFRICAN AMERICAN: >60 ML/MIN
GFR SERPL CREATININE-BSD FRML MDRD: ABNORMAL ML/MIN/{1.73_M2}
GLUCOSE BLD-MCNC: 113 MG/DL (ref 65–105)
GLUCOSE BLD-MCNC: 126 MG/DL (ref 65–105)
GLUCOSE BLD-MCNC: 92 MG/DL (ref 65–105)
GLUCOSE BLD-MCNC: 94 MG/DL (ref 70–99)
GLUCOSE BLD-MCNC: 99 MG/DL (ref 65–105)
PHOSPHORUS: 3 MG/DL (ref 2.6–4.5)
POTASSIUM SERPL-SCNC: 3.4 MMOL/L (ref 3.7–5.3)
SODIUM BLD-SCNC: 140 MMOL/L (ref 135–144)

## 2022-03-19 PROCEDURE — 6370000000 HC RX 637 (ALT 250 FOR IP): Performed by: INTERNAL MEDICINE

## 2022-03-19 PROCEDURE — 2060000000 HC ICU INTERMEDIATE R&B

## 2022-03-19 PROCEDURE — 6370000000 HC RX 637 (ALT 250 FOR IP): Performed by: NURSE PRACTITIONER

## 2022-03-19 PROCEDURE — 2580000003 HC RX 258: Performed by: NURSE PRACTITIONER

## 2022-03-19 PROCEDURE — 80069 RENAL FUNCTION PANEL: CPT

## 2022-03-19 PROCEDURE — 82947 ASSAY GLUCOSE BLOOD QUANT: CPT

## 2022-03-19 PROCEDURE — 36415 COLL VENOUS BLD VENIPUNCTURE: CPT

## 2022-03-19 PROCEDURE — 99232 SBSQ HOSP IP/OBS MODERATE 35: CPT | Performed by: INTERNAL MEDICINE

## 2022-03-19 RX ADMIN — SODIUM CHLORIDE, PRESERVATIVE FREE 10 ML: 5 INJECTION INTRAVENOUS at 07:49

## 2022-03-19 RX ADMIN — CEFUROXIME AXETIL 500 MG: 500 TABLET ORAL at 19:54

## 2022-03-19 RX ADMIN — ASPIRIN 81 MG: 81 TABLET, CHEWABLE ORAL at 07:49

## 2022-03-19 RX ADMIN — SODIUM CHLORIDE, PRESERVATIVE FREE 10 ML: 5 INJECTION INTRAVENOUS at 19:55

## 2022-03-19 RX ADMIN — CEFUROXIME AXETIL 500 MG: 500 TABLET ORAL at 07:49

## 2022-03-19 RX ADMIN — POTASSIUM BICARBONATE 40 MEQ: 782 TABLET, EFFERVESCENT ORAL at 10:37

## 2022-03-19 RX ADMIN — DESMOPRESSIN ACETATE 40 MG: 0.2 TABLET ORAL at 07:49

## 2022-03-19 ASSESSMENT — PAIN SCALES - WONG BAKER

## 2022-03-19 ASSESSMENT — PAIN SCALES - GENERAL
PAINLEVEL_OUTOF10: 0

## 2022-03-19 NOTE — PLAN OF CARE
Problem: Falls - Risk of:  Goal: Will remain free from falls  Description: Will remain free from falls  3/19/2022 0324 by Carmine Skiff, RN  Outcome: Ongoing  3/18/2022 1813 by Radha Clay RN  Outcome: Ongoing  Goal: Absence of physical injury  Description: Absence of physical injury  Outcome: Ongoing   Pt remains free from falls at this time. Floor free from obstacles, and bed is locked and in lowest position. Adequate lighting provided. Call light within reach; pt encouraged to call before getting OOB for any need. Will continue to monitor needs during hourly rounding.     Electronically signed by Carmine Skiff, RN on 3/19/2022 at 3:24 AM

## 2022-03-19 NOTE — PROGRESS NOTES
Physician Progress Note      PATIENT:               Scarlett Alfaro  CSN #:                  [id number]  :                       1940  ADMIT DATE:       3/14/2022 10:00 PM  [address] [place] DATE:  RESPONDING  PROVIDER #:        [place] [facility] TEXT:    Patient admitted with hypotension and MI. If possible, please document in the   progress notes and discharge summary if you are evaluating and/or treating   any of the following: The medical record reflects the following:  Risk Factors: DM Type II, HLD, HTN  Clinical Indicators: Troponin HS  233, 211, 172, 180, 159. Documented in   Cardiac's note as \" likely type II MI/demand ischemia from   hypotension/GLORIA/UTI. \"  Found in the Internal medicine note as NSTEMI. EKG show   nonspecific changes. Treatment: Heparin gtt, ASA, Statin, low dose BB, 2D Echo & Cardiac Cath as   Outpt. Monitor labs and vital signs. Thank you for your time, [facility] MSN, RN. Feel free to call if you have any   questions. [phone] (7a-3p M-F)  Options provided:  -- NSTEMI  -- Type 2 MI  -- Type 2 MI with demand ischemia  -- Demand Ischemia only, no MI  -- Non-ischemic myocardial injury  -- Other - I will add my own diagnosis  -- Disagree - Not applicable / Not valid  -- Disagree - Clinically unable to determine / Unknown  -- Refer to Clinical Documentation Reviewer    PROVIDER RESPONSE TEXT:    This patient has an NSTEMI. Query created by: Lashaun Metcalf on 3/17/2022 1:33 PM      Electronically signed by:   Mame Beaver 3/19/2022 11:06 AM

## 2022-03-20 ENCOUNTER — CLINICAL DOCUMENTATION (OUTPATIENT)
Dept: INPATIENT UNIT | Age: 82
End: 2022-03-20

## 2022-03-20 VITALS
HEART RATE: 80 BPM | BODY MASS INDEX: 23.1 KG/M2 | WEIGHT: 138.67 LBS | OXYGEN SATURATION: 95 % | SYSTOLIC BLOOD PRESSURE: 141 MMHG | TEMPERATURE: 97.7 F | RESPIRATION RATE: 17 BRPM | DIASTOLIC BLOOD PRESSURE: 82 MMHG | HEIGHT: 65 IN

## 2022-03-20 LAB
ANION GAP SERPL CALCULATED.3IONS-SCNC: 10 MMOL/L (ref 9–17)
BUN BLDV-MCNC: 8 MG/DL (ref 8–23)
CALCIUM SERPL-MCNC: 9.1 MG/DL (ref 8.6–10.4)
CHLORIDE BLD-SCNC: 104 MMOL/L (ref 98–107)
CO2: 23 MMOL/L (ref 20–31)
CREAT SERPL-MCNC: 0.6 MG/DL (ref 0.5–0.9)
GFR AFRICAN AMERICAN: >60 ML/MIN
GFR NON-AFRICAN AMERICAN: >60 ML/MIN
GFR SERPL CREATININE-BSD FRML MDRD: ABNORMAL ML/MIN/{1.73_M2}
GLUCOSE BLD-MCNC: 100 MG/DL (ref 70–99)
GLUCOSE BLD-MCNC: 101 MG/DL (ref 65–105)
GLUCOSE BLD-MCNC: 147 MG/DL (ref 65–105)
GLUCOSE BLD-MCNC: 87 MG/DL (ref 65–105)
GLUCOSE BLD-MCNC: 89 MG/DL (ref 65–105)
POTASSIUM SERPL-SCNC: 3.7 MMOL/L (ref 3.7–5.3)
SODIUM BLD-SCNC: 137 MMOL/L (ref 135–144)

## 2022-03-20 PROCEDURE — 6370000000 HC RX 637 (ALT 250 FOR IP): Performed by: INTERNAL MEDICINE

## 2022-03-20 PROCEDURE — 82947 ASSAY GLUCOSE BLOOD QUANT: CPT

## 2022-03-20 PROCEDURE — 76937 US GUIDE VASCULAR ACCESS: CPT

## 2022-03-20 PROCEDURE — 36415 COLL VENOUS BLD VENIPUNCTURE: CPT

## 2022-03-20 PROCEDURE — 99231 SBSQ HOSP IP/OBS SF/LOW 25: CPT | Performed by: INTERNAL MEDICINE

## 2022-03-20 PROCEDURE — 80048 BASIC METABOLIC PNL TOTAL CA: CPT

## 2022-03-20 PROCEDURE — 6370000000 HC RX 637 (ALT 250 FOR IP): Performed by: NURSE PRACTITIONER

## 2022-03-20 RX ADMIN — CEFUROXIME AXETIL 500 MG: 500 TABLET ORAL at 10:11

## 2022-03-20 RX ADMIN — CEFUROXIME AXETIL 500 MG: 500 TABLET ORAL at 19:45

## 2022-03-20 RX ADMIN — DESMOPRESSIN ACETATE 40 MG: 0.2 TABLET ORAL at 10:11

## 2022-03-20 RX ADMIN — POTASSIUM BICARBONATE 40 MEQ: 782 TABLET, EFFERVESCENT ORAL at 10:11

## 2022-03-20 RX ADMIN — ASPIRIN 81 MG: 81 TABLET, CHEWABLE ORAL at 10:11

## 2022-03-20 ASSESSMENT — PAIN SCALES - GENERAL: PAINLEVEL_OUTOF10: 0

## 2022-03-20 NOTE — PLAN OF CARE
Problem: Nutrition  Goal: Optimal nutrition therapy  Outcome: Not Met This Shift     Problem: Skin Integrity:  Goal: Will show no infection signs and symptoms  Description: Will show no infection signs and symptoms  Outcome: Met This Shift     Problem: Skin Integrity:  Goal: Absence of new skin breakdown  Description: Absence of new skin breakdown  Outcome: Met This Shift     Problem: Falls - Risk of:  Goal: Will remain free from falls  Description: Will remain free from falls  Outcome: Met This Shift

## 2022-03-20 NOTE — PROGRESS NOTES
Primary notified that pt only had 100mL of urine out at 0000 and bladder scan showing 55mL. New orders placed for morning BMP. Will continue to monitor and update accordingly.     Electronically signed by Nell Angelucci, RN on 3/20/2022 at 4:17 AM

## 2022-03-20 NOTE — CARE COORDINATION
Transitional planning:  Meek Morris called from [facility] center and can take pt today. Nurse report: [phone]  [phone] back if have transport. 1600 Faxed corrected [facility] ([facility]) life flight network demographic, medical transportation form and Medical necessity form. [facility] and they will call back with transport time. GHADA is active on [place] dept of aging website. Document ID [id number].     [address] to ask about transport time. States transport time 7:45/8:00 pm. [employer], access line, left message with above transport time. Charge nurse notified, need COREY/AVS signed. [address] charge nurse of transport time. Called unit RN, no answer. [address] from Meek Morris, they received message of transport time and will accept then. Called [place] on pt's emergency contact list and notified of transport place  And time, voiced gratitude.

## 2022-03-20 NOTE — PROGRESS NOTES
[facility]  Office: [phone]  Ashutosh Jaeger, DO, Arlen Gilbert DO, Shilpa Rivera DO, Randy Leigh DO, Tip Ramires MD, Ksenia Mas MD, Mikey Braden MD, Myles Fu MD, Whitley Aviles MD, Sinan Garnica MD, Sherly Gastelum MD, Nette Layton DO, Isidoro Guy DO, Tomi Rice MD,  Adrianna Kebede DO, Saundra Chase MD, Cecilio Torres MD, Primitivo Fontanez MD, Socorro Butler DO, Cyrus Goode MD, Maxx Lopez MD, [facility], [facility], [facility], CNP, Allyssa Tee, CNP, Michael Lambert, CNS, Basil Glass, CNP, Will Pak, CNP, Marcelina Velasquez, CNP, Evan Hollins, CNP, Amaya Hernandez, CNP, Janie Alvarez PA-C, Martin Farley, [facility], Elvira Walker, [facility], Wing Vazquez, CNP, Ravindra Lorenz, CNP, Jamie Mims, CNP, Balbina Johnson, CNP, Sally Roman, CNP, Heather Mendoza, [address] [place] [address]    Progress Note    3/20/2022    1:23 PM    Name:   Bhanu Olvera  MRN:     [id number]     Acct:      [de-identified]   Room:   [address] [place] [address] Day:  6  Admit Date:  3/14/2022 10:00 PM    PCP:   Dusty Auguste PA-C  Code Status:  Full Code    Subjective:     C/C: Confusion   Interval History Status: improved    Patient looking good today. She is awake, alert no distress. Spoke with nurse pt is eating and drinking just decreased amounts. Vitals overnight stable on room air     Updated family and spoke with CM. Waiting on precert still. Brief History:      Bhanu Olvera is a 80 y.o. female who initially presented to our hospital for evaluation of hypotension and elevated troponin. Pt was found to have an GLORIA in addition to hypernatremia with sodium of 158 and a UTI . Cultures grew klebsiella. Pt was started on Rocephin, IV hydration for treatment of volume depletion and GLORIA/hypernatremia. Pt's sodium improved. UTI improved.  Pt was evaluated by cardiology for elevation of troponin however workup was deferred until outpatient since pt was recently treated for brain bleed. Cardiology recommended outpatient follow up and medical management. Currently, pt medically stable for discharge. Will need to follow up with PCP within one week of discharge for post hospitalization follow up. Will also need to follow up with cardiology on an outpatient basis.        Review of Systems:     Patient reports starting, unable to answer questions but denies all complaints  Medications: Allergies:  No Known Allergies    Current Meds:   Scheduled Meds:    potassium bicarb-citric acid  40 mEq Oral Daily    cefUROXime  500 mg Oral 2 times per day    metoprolol succinate  12.5 mg Oral Nightly    aspirin  81 mg Oral Daily    atorvastatin  40 mg Oral Daily    sennosides-docusate sodium  2 tablet Oral Daily    sodium chloride flush  5-40 mL IntraVENous 2 times per day    insulin lispro  0-12 Units SubCUTAneous TID WC    insulin lispro  0-6 Units SubCUTAneous Nightly     Continuous Infusions:    sodium chloride      dextrose Stopped (22 [id number])     PRN Meds: acetaminophen, sodium chloride flush, sodium chloride, ondansetron **OR** ondansetron, glucose, dextrose, glucagon (rDNA), dextrose    Data:     Past Medical History:   has a past medical history of Dementia [address] Gout, Hyperlipidemia, Hypertension, and Type II or unspecified type diabetes mellitus without mention of complication, not stated as uncontrolled. Social History:   reports that she quit smoking about 41 years ago. Her smoking use included cigarettes. She quit after 20.00 years of use.  She has never used smokeless tobacco.     Family History:   Family History   Problem Relation Age of Onset    High Blood Pressure Mother        Vitals:  /84   Pulse 75   Temp 97.6 °F (36.4 °C) (Oral)   Resp 16   Ht 5' 5\" (1.651 m)   Wt 138 lb 10.7 oz (62.9 kg)   SpO2 99%   BMI 23.08 kg/m²   Temp (24hrs), Av.7 °F (36.5 °C), Min:97.3 °F (36.3 °C), Max:98.8 °F (37.1 °C)    Recent Labs     03/19/22  1110 03/19/22  1525 03/19/22  1904 03/20/22  0719   POCGLU 113* 126* 92 87       I/O (24Hr): Intake/Output Summary (Last 24 hours) at 3/20/2022 1323  Last data filed at 3/20/2022 1130  Gross per 24 hour   Intake 675 ml   Output 250 ml   Net 425 ml       Labs:  Hematology:  No results for input(s): WBC, RBC, HGB, HCT, MCV, MCH, MCHC, RDW, PLT, MPV, SEDRATE, CRP, INR, DDIMER, FA9VPARN, LABABSO in the last 72 hours. Invalid input(s): PT  Chemistry:  Recent Labs     03/18/22  0520 03/19/22  0450 03/20/22  0453    140 137   K 3.6* 3.4* 3.7    105 104   CO2 22 24 23   GLUCOSE 95 94 100*   BUN 11 10 8   CREATININE 0.58 0.67 0.60   ANIONGAP 12 11 10   LABGLOM >60 >60 >60   GFRAA >60 >60 >60   CALCIUM 8.7 9.0 9.1   PHOS 2.9 3.0  --      Recent Labs     03/18/22  0520 03/18/22  0621 03/18/22  1905 03/19/22  0450 03/19/22  0715 03/19/22  1110 03/19/22  1525 03/19/22  1904 03/20/22  0719   LABALBU 3.2*  --   --  3.2*  --   --   --   --   --    POCGLU  --    < > 113*  --  99 113* 126* 92 87    < > = values in this interval not displayed. ABG:  Lab Results   Component Value Date    FIO2 NOT REPORTED 02/17/2022     Lab Results   Component Value Date/Time    SPECIAL NOT REPORTED 02/19/2022 08:21 AM     Lab Results   Component Value Date/Time    CULTURE Unable to perform testing: No specimen received. 03/15/2022 02:14 PM       Radiology:  CT HEAD WO CONTRAST    Result Date: 3/14/2022  From the previously noted areas of subarachnoid hemorrhage only minimal subarachnoid hemorrhage is left within the right frontal convexity sulci. The other areas have resolved. Changes related to right frontal craniotomy with interval removal of right subdural drain with 12 mm chronic subdural collection within the right frontoparietal region with 3-4 mm leftward midline shift. . Subdural collection is unchanged in size however the acute blood products have resolved.  No evidence for acute intraparenchymal hemorrhage, territorial infarction or intracranial mass lesion. Moderate chronic microangiopathic ischemic disease. Unchanged foci of chronic lacunar infarction Mild generalized volume loss. RECOMMENDATIONS: Unavailable     XR CHEST PORTABLE    Result Date: 3/14/2022  No acute cardiopulmonary process. Physical Examination:        General appearance:  alert, cooperative and chronically ill appearing  Mental Status: She is not oriented to person, place or time. Incision noted over previous bur hole  Lungs:  clear to auscultation bilaterally, normal effort  Heart:  regular rate and rhythm, no murmur  Abdomen:  soft, nontender, nondistended, normal bowel sounds, no masses, hepatomegaly, splenomegaly  Extremities:  no edema, redness, tenderness in the calves  Skin:  no gross lesions, rashes, induration    Assessment:        Hospital Problems           Last Modified POA    * (Principal) NSTEMI (non-ST elevated myocardial infarction) ([address] 3/16/2022 Yes    Hypotension 3/18/2022 Yes    Hypernatremia 3/15/2022 Yes    Acute kidney injury ([address] 3/15/2022 Yes    Acute cystitis 9/31/9862 Yes    Metabolic encephalopathy from Hypernatremia  3/17/2022 Yes    Type 2 diabetes mellitus without complication, without long-term current use of insulin ([address] 3/15/2022 Yes    Subdural hematoma ([address] 3/15/2022 Yes    S/P evacuation of subdural hematoma 3/15/2022 Yes    Essential hypertension 3/15/2022 Yes    Acute dehydration 3/15/2022 Yes    Dysphagia 3/15/2022 Yes    Acute hypokalemia 3/16/2022 No    High anion gap metabolic acidosis 3/99/8208 Yes          Plan:         Toxic metabolic encephalopathy from UTI and Electrolyte abnormalities: Resolved   NSTEMI:Troponin is stable. Completed heparin gtt for 48 hours. Spoke with Cardiology, okay to get echo outpatient.  No further ischemic workup  Acute Kidney injury: resolved   Acute Hypokalemia: replace potassium   Urinary Tract infection: UA shows positive nitrates, large leuk esterase, many bacteria. Previous cultures positive for klebsiella sensitive to Rocephin. Will transition to PO prior to dc for 5 days total.   History of subdural hematoma requiring bur hole previously: Neurosurgery okay to continue heparin infusion without bolus, No intervention at this time. Repeat CT unchanged. Needs follow up with CTS in 4-6 weeks  HAGMA: resolved   Primary Hypertension: restart select home medications   Non-insulin-dependent diabetes mellitus type 2 with hyperglycemia: stop insulin. Has not needed it. Spoke with pharmacy, has not need janumet either   Dyslipidemia: continue statin  DVT ppx  PTOT    Dispo: Dc order placed 3/16 waiting on placement.    Faraz Wynne DO  3/20/2022  1:23 PM

## 2022-03-20 NOTE — PLAN OF CARE
Problem: Falls - Risk of:  Goal: Will remain free from falls  Description: Will remain free from falls  3/20/2022 0216 by Gabrielle Dawson RN  Outcome: Met This Shift  3/19/2022 1805 by Bushra Tinsley RN  Outcome: Ongoing  Goal: Absence of physical injury  Description: Absence of physical injury  Outcome: Met This Shift     Problem: Skin Integrity:  Goal: Will show no infection signs and symptoms  Description: Will show no infection signs and symptoms  3/20/2022 0216 by Gabrielle Dawson RN  Outcome: Ongoing  3/19/2022 1805 by Bushra Tinsley RN  Outcome: Ongoing  Goal: Absence of new skin breakdown  Description: Absence of new skin breakdown  Outcome: Ongoing     Problem: Nutrition  Goal: Optimal nutrition therapy  3/20/2022 0216 by Gabrielel Dawson RN  Outcome: Not Met This Shift  3/19/2022 1805 by Bushra Tinsley RN  Outcome: Ongoing

## 2022-03-20 NOTE — FLOWSHEET NOTE
Pt was resting.  told the pt we will return at later time.           03/20/22 1323   Encounter Summary   Services provided to: Patient   Referral/Consult From: Rounding   Continue Visiting   (3/20/22)   Complexity of Encounter Low   Routine   Type Initial   Assessment Sleeping   Intervention Sustaining presence/ Ministry of presence   Outcome Did not respond

## 2022-03-21 NOTE — PROGRESS NOTES
Pt picked up by [facility] to transport patient to facility. All belongings collected, IV removed and pt discharged.       Electronically signed by Linda Vogt RN on 3/20/2022 at 9:03 PM

## 2022-03-22 NOTE — PROGRESS NOTES
Occupational Therapy  Facility/Department: [facility] 4B STEPDOWN  Daily Treatment Note  NAME: Bhanu Olvera  : 1940  MRN: [id number]    Date of Service: 3/18/2022    Discharge Recommendations:  Patient would benefit from continued therapy after discharge       Assessment   Performance deficits / Impairments: Decreased functional mobility ; Decreased endurance;Decreased high-level IADLs;Decreased ADL status; Decreased safe awareness;Decreased balance;Decreased strength;Decreased ROM; Decreased fine motor control;Decreased coordination;Decreased posture;Decreased vision/visual deficit; Decreased cognition  Assessment: Patient supine upon arrival, demo decreased cognition throughout impacting following of commands and responses to social questions to obtain PLOF. Pt required Max A to complete bed mobility. ADL tasks with MOD>MAX and mod verbal instruction. Prognosis: Fair  OT Education: ADL Adaptive Strategies  Patient Education: Safety and importance of all therapy participation with poor understanding due to decreased cognition  REQUIRES OT FOLLOW UP: Yes  Activity Tolerance  Activity Tolerance: Treatment limited secondary to decreased cognition;Patient Tolerated treatment well  Activity Tolerance: fair tolerance to 38 minutes OT ADL session with 25 minutes sitting EOB  Safety Devices  Safety Devices in place: Yes  Type of devices: Call light within reach;Nurse notified; Left in bed;Bed alarm in place; Patient at risk for falls  Restraints  Initially in place: No         Patient Diagnosis(es): There were no encounter diagnoses. has a past medical history of Dementia ([facility] [address] Gout, Hyperlipidemia, Hypertension, and Type II or unspecified type diabetes mellitus without mention of complication, not stated as uncontrolled. has a past surgical history that includes [facility] (Right, 2022) and craniotomy (N/A, 2022).     Restrictions  Restrictions/Precautions  Restrictions/Precautions: Fall Risk,General Precautions  Required Braces or Orthoses?: No  Position Activity Restriction  Other position/activity restrictions: up w/assist  Subjective   General  Patient assessed for rehabilitation services?: Yes  Response to previous treatment: Patient with no complaints from previous session  Family / Caregiver Present: No  Subjective  Subjective: RN ok'd pt for OT session this morning. Pt supine in bed upon WOOD arrival and is pleasant throughout session. General Comment  Comments: Pt is mostly non-verbal throughout  Vital Signs  Patient Currently in Pain: No   Orientation  Orientation  Overall Orientation Status: Impaired  Orientation Level: Disoriented to place;Oriented to person;Disoriented to situation;Disoriented to time  Objective    ADL  Grooming: Moderate assistance; Increased time to complete;Setup;Verbal cueing  UE Bathing: Setup;Verbal cueing; Moderate assistance  LE Bathing: Setup;Verbal cueing;Maximum assistance  UE Dressing: Setup;Verbal cueing; Moderate assistance (hospital room)  Additional Comments: Pt sat EOB 25 minutes for bathing and dressing tasks with UB bath MOD A overall, LB bath MAX A with mod verbal cues, MOD A don clean hospital gown. Pt demonstrated fair sitting balance this session for ADL tasks. Pt is non-verbal and required frequent verbal instruction to task with delayed response. Balance  Sitting Balance: Contact guard assistance (Pt sat EOB 25 minutes for ADL tasks with SBA>CGA for safety.  Pt had no LOB throughout session.)  Bed mobility  Rolling to Left: Maximum assistance  Rolling to Right: Maximum assistance  Supine to Sit: Maximum assistance  Sit to Supine: Maximum assistance  Scooting: Maximal assistance  Transfers  Transfer Comments: stand and transfer not addressed this session due to fatigue following bathing task                       Cognition  Overall Cognitive Status: Exceptions  Arousal/Alertness: Inconsistent responses to stimuli;Delayed responses to stimuli  Following Commands: Follows one step commands with repetition; Inconsistently follows commands; Follows one step commands with increased time  Attention Span: Difficulty attending to directions; Attends with cues to redirect  Memory: Decreased recall of biographical Information;Decreased recall of recent events;Decreased short term memory;Decreased long term memory  Safety Judgement: Decreased awareness of need for assistance;Decreased awareness of need for safety  Problem Solving: Decreased awareness of errors;Assistance required to correct errors made;Assistance required to identify errors made  Insights: Not aware of deficits  Initiation: Requires cues for all  Sequencing: Requires cues for all  Cognition Comment: Pt requires extra time to collow instructions                                         Plan   Plan  Times per week: 3-4x/wk  Current Treatment Recommendations: Strengthening,Endurance Training,Patient/Caregiver Education & Training,Equipment Evaluation, Education, & procurement,Self-Care / ADL,Home Management Training,Safety Education & Training,Functional Mobility Training,Balance Training,Positioning  G-Code     OutComes Score                                                  AM-PAC Score        AM-[facility] Inpatient Daily Activity Raw Score: 12 (03/18/22 1054)  AM-PAC Inpatient ADL T-Scale Score : 30.6 (03/18/22 1054)  ADL Inpatient CMS 0-100% Score: 66.57 (03/18/22 1054)  ADL Inpatient CMS G-Code Modifier : CL (03/18/22 1054)    Goals  Short term goals  Time Frame for Short term goals: Patient will, by discharge  Short term goal 1: demo self-feeding/grooming at [facility] using AE PRN  Short term goal [facility] goal 3: demo 12+ min of dynamic sitting balance at [facility] to engage in functional tasks  Short term goal 4: demo functional transfers at [facility] to engage in ADLs  Short term goal 5: demo bed mobility at [facility] to promote OOB activities and participate in pressure relief  Short term goal 6: participate in [facility] activity with >90% accuracy to improve self-feeding and grooming task performance       Therapy Time   Individual Concurrent Group Co-treatment   Time In  1000         Time Out  1038         Minutes           Timed Code Treatment Minutes: 25 [facility], WOOD/

## 2022-08-07 NOTE — PROGRESS NOTES
Physician Progress Note      PATIENT:               Ricarda Butler  CSN #:                  [id number]  :                       1940  ADMIT DATE:       2022 9:09 PM  [address] [place] [employer] DATE:  RESPONDING  PROVIDER #:        Threesa Pham CNP          QUERY TEXT:    Pt admitted with acute on chronic RT Subdural hematoma. Pt noted to have   documentation of 4 mm midline shift s/p GILLIAN hole for evacuation of subdural   hematoma. If clinically significant, please document in progress notes and   discharge summary if you are evaluating/treating any of the following: The medical record reflects the following:  Risk Factors: htn, dm type 2  Clinical Indicators: admitted with acute on chronic RT Subdural hematoma. Pt   noted to have documentation of 4 mm midline shift s/p GILLIAN hole for evacuation   of subdural hematoma. Treatment: icu monitoring, surgery, CTs of brain    Thank Diannah Schwab RN BSN  CCDS  Email [email or web address] Couple  Cell [phone]  office hours M-F 6am to 2:30pm  Options provided:  -- Cerebral edema  -- Brain compression  -- Cerebral edema and Brain compression  -- Other - I will add my own diagnosis  -- Disagree - Not applicable / Not valid  -- Disagree - Clinically unable to determine / Unknown  -- Refer to Clinical Documentation Reviewer    PROVIDER RESPONSE TEXT:    This patient has cerebral edema and brain compression. Query created by:  Lena Ricks on 2022 11:56 AM      Electronically signed by:  Theresa Pham CNP 2022 3:36 PM 98.1

## 2023-09-19 NOTE — PROGRESS NOTES
Addended by: FATOUMATA ALLEN on: 9/19/2023 09:44 AM     Modules accepted: Orders     [facility]  Office: [address], DO, Marcellus Hubbard, DO, Kavita Carey, DO, Renny Leigh, DO, Marianne Mcclain MD, Keren De Souza MD, Tani Avelra MD, Tania Macario MD, Neftali Silveira MD, Dev Villanueva MD, Michael Bagley MD, Eleno Rod, DO, Edna Rogers, DO, Maureen Neumann MD,  Candis Crook, DO, Amanda Garnica MD, Mackenzie Flores MD, Amanda Christopher MD, Nela Shields DO, Bianka Kyle MD, Snehal Mark MD, Gurjit Pereyra, [facility], [facility] Angel, CNP, Shilpi Henley, CNP, Bong Campos, CNS, Jose Smith, CNP, Justina Villalta, CNP, Deep Andino, CNP, Ani Melendez, CNP, Ronda Gay, CNP, Jeevan Wei PA-C, Cristina Clark, [facility], Desire Dickens, [facility], Eric Pope, CNP, Kylah Romero, CNP, Aisha Morales, CNP, Sumeet Ramey, CNP, Anna Silva, [facility], Angel Mcclain, [facility]    Progress Note    3/19/2022    10:56 AM    Name:   Génesis Park  MRN:     [id number]     Acct:      [de-identified]   Room:   [address] [place] [address] Day:  5  Admit Date:  3/14/2022 10:00 PM    PCP:   Denisse Hein PA-C  Code Status:  Full Code    Subjective:     C/C: Confusion   Interval History Status: improved    Patient looking good today. She is awake, alert no distress. She is more responsive. Oriented to person but able to engage in conversation     Vitals overnight stable on room air    Potassium low today, will replace     Updated family and spoke with CM. Waiting on precert still. Brief History:      Génesis Park is a 80 y.o. female who initially presented to our hospital for evaluation of hypotension and elevated troponin. Pt was found to have an GLORIA in addition to hypernatremia with sodium of 158 and a UTI . Cultures grew klebsiella. Pt was started on Rocephin, IV hydration for treatment of volume depletion and GLORIA/hypernatremia. Pt's sodium improved. UTI improved.  Pt was evaluated by cardiology for elevation of troponin however workup was deferred until outpatient since pt was recently treated for brain bleed. Cardiology recommended outpatient follow up and medical management. Currently, pt medically stable for discharge. Will need to follow up with PCP within one week of discharge for post hospitalization follow up. Will also need to follow up with cardiology on an outpatient basis.        Review of Systems:     Patient reports starting, unable to answer questions but denies all complaints  Medications: Allergies:  No Known Allergies    Current Meds:   Scheduled Meds:    potassium bicarb-citric acid  40 mEq Oral Daily    cefUROXime  500 mg Oral 2 times per day    metoprolol succinate  12.5 mg Oral Nightly    aspirin  81 mg Oral Daily    atorvastatin  40 mg Oral Daily    sennosides-docusate sodium  2 tablet Oral Daily    sodium chloride flush  5-40 mL IntraVENous 2 times per day    insulin lispro  0-12 Units SubCUTAneous TID WC    insulin lispro  0-6 Units SubCUTAneous Nightly     Continuous Infusions:    sodium chloride      dextrose Stopped (22 [id number])     PRN Meds: acetaminophen, sodium chloride flush, sodium chloride, ondansetron **OR** ondansetron, glucose, dextrose, glucagon (rDNA), dextrose    Data:     Past Medical History:   has a past medical history of Dementia ([facility] [address] Gout, Hyperlipidemia, Hypertension, and Type II or unspecified type diabetes mellitus without mention of complication, not stated as uncontrolled. Social History:   reports that she quit smoking about 41 years ago. Her smoking use included cigarettes. She quit after 20.00 years of use.  She has never used smokeless tobacco.     Family History:   Family History   Problem Relation Age of Onset    High Blood Pressure Mother        Vitals:  BP (!) 119/58   Pulse 82   Temp 97.8 °F (36.6 °C) (Axillary)   Resp 17   Ht 5' 5\" (1.651 m)   Wt 140 lb 10.5 oz (63.8 kg)   SpO2 95%   BMI 23.41 kg/m²   Temp (24hrs), Av.9 °F (36.6 °C), Min:97.4 °F (36.3 °C), Max:98.5 °F (36.9 °C)    Recent Labs     03/18/22  1107 03/18/22  1515 03/18/22  1905 03/19/22  0715   POCGLU 113* 96 113* 99       I/O (24Hr): Intake/Output Summary (Last 24 hours) at 3/19/2022 1056  Last data filed at 3/19/2022 0500  Gross per 24 hour   Intake --   Output 900 ml   Net -900 ml       Labs:  Hematology:  No results for input(s): WBC, RBC, HGB, HCT, MCV, MCH, MCHC, RDW, PLT, MPV, SEDRATE, CRP, INR, DDIMER, [id number], LABABSO in the last 72 hours. Invalid input(s): PT  Chemistry:  Recent Labs     03/17/22  0416 03/18/22  0520 03/19/22  0450    138 140   K 3.7 3.6* 3.4*    104 105   CO2 21 22 24   GLUCOSE 117* 95 94   BUN 18 11 10   CREATININE 0.64 0.58 0.67   ANIONGAP 15 12 11   LABGLOM >60 >60 >60   GFRAA >60 >60 >60   CALCIUM 8.6 8.7 9.0   PHOS 2.3* 2.9 3.0     Recent Labs     03/17/22  0416 03/17/22  0703 03/17/22  2019 03/18/22  0520 03/18/22  0621 03/18/22  1107 03/18/22  1515 03/18/22  1905 03/19/22  0450 03/19/22  0715   LABALBU 3.2*  --   --  3.2*  --   --   --   --  3.2*  --    POCGLU  --    < > 107*  --  92 113* 96 113*  --  99    < > = values in this interval not displayed. ABG:  Lab Results   Component Value Date    FIO2 NOT REPORTED 02/17/2022     Lab Results   Component Value Date/Time    SPECIAL NOT REPORTED 02/19/2022 08:21 AM     Lab Results   Component Value Date/Time    CULTURE Unable to perform testing: No specimen received. 03/15/2022 02:14 PM       Radiology:  CT HEAD WO CONTRAST    Result Date: 3/14/2022  From the previously noted areas of subarachnoid hemorrhage only minimal subarachnoid hemorrhage is left within the right frontal convexity sulci. The other areas have resolved. Changes related to right frontal craniotomy with interval removal of right subdural drain with 12 mm chronic subdural collection within the right frontoparietal region with 3-4 mm leftward midline shift. . Subdural collection is unchanged in size however the acute blood products have resolved. No evidence for acute intraparenchymal hemorrhage, territorial infarction or intracranial mass lesion. Moderate chronic microangiopathic ischemic disease. Unchanged foci of chronic lacunar infarction Mild generalized volume loss. RECOMMENDATIONS: Unavailable     XR CHEST PORTABLE    Result Date: 3/14/2022  No acute cardiopulmonary process. Physical Examination:        General appearance:  alert, cooperative and chronically ill appearing  Mental Status: She is not oriented to person, place or time. Incision noted over previous bur hole  Lungs:  clear to auscultation bilaterally, normal effort  Heart:  regular rate and rhythm, no murmur  Abdomen:  soft, nontender, nondistended, normal bowel sounds, no masses, hepatomegaly, splenomegaly  Extremities:  no edema, redness, tenderness in the calves  Skin:  no gross lesions, rashes, induration    Assessment:        Hospital Problems           Last Modified POA    * (Principal) NSTEMI (non-ST elevated myocardial infarction) ([address] 3/16/2022 Yes    Hypotension 3/18/2022 Yes    Hypernatremia 3/15/2022 Yes    Acute kidney injury ([address] 3/15/2022 Yes    Acute cystitis 5/03/7980 Yes    Metabolic encephalopathy from Hypernatremia  3/17/2022 Yes    Type 2 diabetes mellitus without complication, without long-term current use of insulin ([address] 3/15/2022 Yes    Subdural hematoma ([address] 3/15/2022 Yes    S/P evacuation of subdural hematoma 3/15/2022 Yes    Essential hypertension 3/15/2022 Yes    Acute dehydration 3/15/2022 Yes    Dysphagia 3/15/2022 Yes    Acute hypokalemia 3/16/2022 No    High anion gap metabolic acidosis 5/47/8416 Yes          Plan:         Toxic metabolic encephalopathy from UTI and Electrolyte abnormalities: Resolved   NSTEMI:Troponin is stable. Completed heparin gtt for 48 hours. Spoke with Cardiology, okay to get echo outpatient.  No further ischemic workup  Acute Kidney injury: resolved   Acute Hypokalemia: replace potassium Urinary Tract infection: UA shows positive nitrates, large leuk esterase, many bacteria. Previous cultures positive for klebsiella sensitive to Rocephin. Will transition to PO prior to dc for 5 days total.   History of subdural hematoma requiring bur hole previously: Neurosurgery okay to continue heparin infusion without bolus, No intervention at this time. Repeat CT unchanged. Needs follow up with CTS in 4-6 weeks  HAGMA: resolved   Primary Hypertension: restart select home medications   Non-insulin-dependent diabetes mellitus type 2 with hyperglycemia: stop insulin. Has not needed it. Spoke with pharmacy, has not need janumet either   Dyslipidemia: continue statin  DVT ppx  PTOT    Dispo: Dc order placed 3/16 waiting on placement.    Billy Polk DO  3/19/2022  10:56 AM

## (undated) DEVICE — CONNECTOR,TUBING,5-IN-1,NON-STERILE: Brand: MEDLINE INDUSTRIES, INC.

## (undated) DEVICE — HOOK RETRCT L5MM BLNT ELAS STAY

## (undated) DEVICE — SPONGE,NEURO,0.5"X0.5",XR,STRL,10/PK: Brand: MEDLINE

## (undated) DEVICE — POSITIONER,HEAD,MULTIRING,36CS: Brand: MEDLINE

## (undated) DEVICE — ZYPHR DISPOSABLE CRANIAL PERFORATOR, LARGE 14/11MM: Brand: ZYPHR

## (undated) DEVICE — GLOVE ORTHO 8   MSG9480

## (undated) DEVICE — TUBING, SUCTION, 9/32" X 20', STRAIGHT: Brand: MEDLINE INDUSTRIES, INC.

## (undated) DEVICE — BLADE CLP TAPR HD WET DRY CAPABILITY GTT IN CHARGING USE

## (undated) DEVICE — BUR SURG OD9MM M S STL CUT ACORN N FLUT FOR TPS MIDAS REX

## (undated) DEVICE — APPLICATOR MEDICATED 10.5 CC SOLUTION HI LT ORNG CHLORAPREP

## (undated) DEVICE — GOWN,SIRUS,NONRNF,SETINSLV,XL,20/CS: Brand: MEDLINE

## (undated) DEVICE — ACCUDRAIN® EXTERNAL CSF DRAINAGE SYSTEM WITH ANTI-REFLUX VALVE: Brand: ACCUDRAIN®

## (undated) DEVICE — CATHETER,URETHRAL,REDRUBBER,STRL,12FR: Brand: MEDLINE INDUSTRIES, INC.

## (undated) DEVICE — CRANIOTOMY DRAPE, STERILE: Brand: MEDLINE

## (undated) DEVICE — AGENT HEMOSTATIC SURGIFLOW MATRIX KIT W/THROMBIN

## (undated) DEVICE — DISPOSABLE IRRIGATION BIPOLAR CORD, M1000 TYPE: Brand: KIRWAN

## (undated) DEVICE — GLOVE ORANGE PI 7   MSG9070

## (undated) DEVICE — SUTURE VCRL SZ 2-0 L18IN ABSRB VLT L26MM SH 1/2 CIR J775D

## (undated) DEVICE — PAD,NON-ADHERENT,3X8,STERILE,LF,1/PK: Brand: MEDLINE

## (undated) DEVICE — TOWEL,OR,DSP,ST,NATURAL,DLX,4/PK,20PK/CS: Brand: MEDLINE

## (undated) DEVICE — Device

## (undated) DEVICE — DRAPE MICSCP W117XL305CM DIA65MM LENS W VARI LENS2 FOR LEICA

## (undated) DEVICE — MARKER,SKIN,WI/RULER AND LABELS: Brand: MEDLINE

## (undated) DEVICE — GLOVE EXAM SM L9.5IN FNGR THK3.6MIL PALM THK2.8MIL OFF WHT

## (undated) DEVICE — GLOVE SURG SZ 65 THK91MIL LTX FREE SYN POLYISOPRENE

## (undated) DEVICE — DRESSING,GAUZE,XEROFORM,CURAD,1"X8",ST: Brand: CURAD

## (undated) DEVICE — CONTAINER SPEC 33OZ URIN COLL BIODEGRADABLE PAPER DISP

## (undated) DEVICE — CATHETER EVD L35CM LUMN ID1.5MM DEPTH MRK 3-15CM FOR EXT

## (undated) DEVICE — SVMMC CRANI PK